# Patient Record
Sex: FEMALE | Race: WHITE | NOT HISPANIC OR LATINO | Employment: FULL TIME | URBAN - METROPOLITAN AREA
[De-identification: names, ages, dates, MRNs, and addresses within clinical notes are randomized per-mention and may not be internally consistent; named-entity substitution may affect disease eponyms.]

---

## 2021-10-02 ENCOUNTER — HOSPITAL ENCOUNTER (EMERGENCY)
Facility: HOSPITAL | Age: 46
Discharge: HOME | End: 2021-10-02
Payer: COMMERCIAL

## 2021-10-02 VITALS
OXYGEN SATURATION: 99 % | SYSTOLIC BLOOD PRESSURE: 201 MMHG | DIASTOLIC BLOOD PRESSURE: 106 MMHG | HEART RATE: 111 BPM | TEMPERATURE: 98.1 F | WEIGHT: 230.9 LBS | BODY MASS INDEX: 39.42 KG/M2 | RESPIRATION RATE: 20 BRPM | HEIGHT: 64 IN

## 2021-10-02 DIAGNOSIS — J06.9 VIRAL URI WITH COUGH: Primary | ICD-10-CM

## 2021-10-02 LAB — SARS-COV-2 RNA RESP QL NAA+PROBE: NEGATIVE

## 2021-10-02 PROCEDURE — 99284 EMERGENCY DEPT VISIT MOD MDM: CPT | Performed by: PHYSICIAN ASSISTANT

## 2021-10-02 PROCEDURE — 99283 EMERGENCY DEPT VISIT LOW MDM: CPT

## 2021-10-02 PROCEDURE — 87635 SARS-COV-2 COVID-19 AMP PRB: CPT | Performed by: PHYSICIAN ASSISTANT

## 2021-10-03 ENCOUNTER — TELEPHONE (OUTPATIENT)
Dept: EMERGENCY DEPT | Facility: HOSPITAL | Age: 46
End: 2021-10-03

## 2021-12-29 PROCEDURE — 87636 SARSCOV2 & INF A&B AMP PRB: CPT | Performed by: FAMILY MEDICINE

## 2024-06-03 ENCOUNTER — APPOINTMENT (EMERGENCY)
Dept: RADIOLOGY | Facility: HOSPITAL | Age: 49
DRG: 638 | End: 2024-06-03
Payer: COMMERCIAL

## 2024-06-03 ENCOUNTER — HOSPITAL ENCOUNTER (INPATIENT)
Facility: HOSPITAL | Age: 49
LOS: 5 days | Discharge: HOME | DRG: 638 | End: 2024-06-08
Attending: INTERNAL MEDICINE | Admitting: INTERNAL MEDICINE
Payer: COMMERCIAL

## 2024-06-03 DIAGNOSIS — R53.1 GENERALIZED WEAKNESS: ICD-10-CM

## 2024-06-03 DIAGNOSIS — R90.89 ABNORMAL FINDING ON MRI OF BRAIN: ICD-10-CM

## 2024-06-03 DIAGNOSIS — R79.89 ELEVATED TROPONIN: ICD-10-CM

## 2024-06-03 DIAGNOSIS — E11.65 TYPE 2 DIABETES MELLITUS WITH HYPERGLYCEMIA (HCC): ICD-10-CM

## 2024-06-03 DIAGNOSIS — R42 LIGHTHEADEDNESS: ICD-10-CM

## 2024-06-03 DIAGNOSIS — R00.0 TACHYCARDIA: ICD-10-CM

## 2024-06-03 DIAGNOSIS — E11.69 HYPERLIPIDEMIA ASSOCIATED WITH TYPE 2 DIABETES MELLITUS  (HCC): ICD-10-CM

## 2024-06-03 DIAGNOSIS — E78.5 HYPERLIPIDEMIA ASSOCIATED WITH TYPE 2 DIABETES MELLITUS  (HCC): ICD-10-CM

## 2024-06-03 DIAGNOSIS — E11.65 HYPERGLYCEMIA DUE TO DIABETES MELLITUS (HCC): Primary | ICD-10-CM

## 2024-06-03 PROBLEM — E87.29 HIGH ANION GAP METABOLIC ACIDOSIS: Status: ACTIVE | Noted: 2024-06-03

## 2024-06-03 PROBLEM — I10 ESSENTIAL HYPERTENSION: Status: ACTIVE | Noted: 2024-06-03

## 2024-06-03 PROBLEM — E66.01 MORBID OBESITY (HCC): Status: ACTIVE | Noted: 2024-06-03

## 2024-06-03 PROBLEM — R94.31 ABNORMAL EKG: Status: ACTIVE | Noted: 2024-06-03

## 2024-06-03 LAB
25(OH)D3 SERPL-MCNC: 16.9 NG/ML (ref 30–100)
2HR DELTA HS TROPONIN: 248 NG/L
4HR DELTA HS TROPONIN: 336 NG/L
ALBUMIN SERPL BCP-MCNC: 3.9 G/DL (ref 3.5–5)
ALP SERPL-CCNC: 62 U/L (ref 34–104)
ALT SERPL W P-5'-P-CCNC: 20 U/L (ref 7–52)
ANION GAP SERPL CALCULATED.3IONS-SCNC: 11 MMOL/L (ref 4–13)
ANION GAP SERPL CALCULATED.3IONS-SCNC: 17 MMOL/L (ref 4–13)
ANION GAP SERPL CALCULATED.3IONS-SCNC: 9 MMOL/L (ref 4–13)
AST SERPL W P-5'-P-CCNC: 17 U/L (ref 13–39)
B-OH-BUTYR SERPL-MCNC: 0.58 MMOL/L (ref 0.02–0.27)
BASOPHILS # BLD AUTO: 0.05 THOUSANDS/ÂΜL (ref 0–0.1)
BASOPHILS NFR BLD AUTO: 1 % (ref 0–1)
BILIRUB SERPL-MCNC: 0.39 MG/DL (ref 0.2–1)
BNP SERPL-MCNC: 26 PG/ML (ref 0–100)
BUN SERPL-MCNC: 29 MG/DL (ref 5–25)
BUN SERPL-MCNC: 29 MG/DL (ref 5–25)
BUN SERPL-MCNC: 30 MG/DL (ref 5–25)
CALCIUM SERPL-MCNC: 7.8 MG/DL (ref 8.4–10.2)
CALCIUM SERPL-MCNC: 8.2 MG/DL (ref 8.4–10.2)
CALCIUM SERPL-MCNC: 8.8 MG/DL (ref 8.4–10.2)
CARDIAC TROPONIN I PNL SERPL HS: 25 NG/L
CARDIAC TROPONIN I PNL SERPL HS: 273 NG/L
CARDIAC TROPONIN I PNL SERPL HS: 361 NG/L
CARDIAC TROPONIN I PNL SERPL HS: 407 NG/L (ref 8–18)
CHLORIDE SERPL-SCNC: 103 MMOL/L (ref 96–108)
CHLORIDE SERPL-SCNC: 104 MMOL/L (ref 96–108)
CHLORIDE SERPL-SCNC: 99 MMOL/L (ref 96–108)
CO2 SERPL-SCNC: 19 MMOL/L (ref 21–32)
CO2 SERPL-SCNC: 22 MMOL/L (ref 21–32)
CO2 SERPL-SCNC: 24 MMOL/L (ref 21–32)
CREAT SERPL-MCNC: 1.07 MG/DL (ref 0.6–1.3)
CREAT SERPL-MCNC: 1.12 MG/DL (ref 0.6–1.3)
CREAT SERPL-MCNC: 1.49 MG/DL (ref 0.6–1.3)
EOSINOPHIL # BLD AUTO: 0.15 THOUSAND/ÂΜL (ref 0–0.61)
EOSINOPHIL NFR BLD AUTO: 2 % (ref 0–6)
ERYTHROCYTE [DISTWIDTH] IN BLOOD BY AUTOMATED COUNT: 17.6 % (ref 11.6–15.1)
EST. AVERAGE GLUCOSE BLD GHB EST-MCNC: 349 MG/DL
GFR SERPL CREATININE-BSD FRML MDRD: 40 ML/MIN/1.73SQ M
GFR SERPL CREATININE-BSD FRML MDRD: 57 ML/MIN/1.73SQ M
GFR SERPL CREATININE-BSD FRML MDRD: 60 ML/MIN/1.73SQ M
GLUCOSE SERPL-MCNC: 102 MG/DL (ref 65–140)
GLUCOSE SERPL-MCNC: 107 MG/DL (ref 65–140)
GLUCOSE SERPL-MCNC: 125 MG/DL (ref 65–140)
GLUCOSE SERPL-MCNC: 137 MG/DL (ref 65–140)
GLUCOSE SERPL-MCNC: 187 MG/DL (ref 65–140)
GLUCOSE SERPL-MCNC: 195 MG/DL (ref 65–140)
GLUCOSE SERPL-MCNC: 197 MG/DL (ref 65–140)
GLUCOSE SERPL-MCNC: 202 MG/DL (ref 65–140)
GLUCOSE SERPL-MCNC: 252 MG/DL (ref 65–140)
GLUCOSE SERPL-MCNC: 321 MG/DL (ref 65–140)
HBA1C MFR BLD: 13.8 %
HCT VFR BLD AUTO: 43.3 % (ref 34.8–46.1)
HGB BLD-MCNC: 13.2 G/DL (ref 11.5–15.4)
IMM GRANULOCYTES # BLD AUTO: 0.03 THOUSAND/UL (ref 0–0.2)
IMM GRANULOCYTES NFR BLD AUTO: 0 % (ref 0–2)
LYMPHOCYTES # BLD AUTO: 1.89 THOUSANDS/ÂΜL (ref 0.6–4.47)
LYMPHOCYTES NFR BLD AUTO: 19 % (ref 14–44)
MAGNESIUM SERPL-MCNC: 1.5 MG/DL (ref 1.9–2.7)
MCH RBC QN AUTO: 20 PG (ref 26.8–34.3)
MCHC RBC AUTO-ENTMCNC: 30.5 G/DL (ref 31.4–37.4)
MCV RBC AUTO: 66 FL (ref 82–98)
MONOCYTES # BLD AUTO: 0.9 THOUSAND/ÂΜL (ref 0.17–1.22)
MONOCYTES NFR BLD AUTO: 9 % (ref 4–12)
NEUTROPHILS # BLD AUTO: 7.21 THOUSANDS/ÂΜL (ref 1.85–7.62)
NEUTS SEG NFR BLD AUTO: 69 % (ref 43–75)
NRBC BLD AUTO-RTO: 0 /100 WBCS
PH BLDV: 7.39 [PH] (ref 7.3–7.4)
PHOSPHATE SERPL-MCNC: 2.8 MG/DL (ref 2.7–4.5)
PLATELET # BLD AUTO: 397 THOUSANDS/UL (ref 149–390)
PMV BLD AUTO: 10 FL (ref 8.9–12.7)
POTASSIUM SERPL-SCNC: 3.6 MMOL/L (ref 3.5–5.3)
POTASSIUM SERPL-SCNC: 3.6 MMOL/L (ref 3.5–5.3)
POTASSIUM SERPL-SCNC: 3.7 MMOL/L (ref 3.5–5.3)
PROT SERPL-MCNC: 7.7 G/DL (ref 6.4–8.4)
RBC # BLD AUTO: 6.61 MILLION/UL (ref 3.81–5.12)
SODIUM SERPL-SCNC: 135 MMOL/L (ref 135–147)
SODIUM SERPL-SCNC: 136 MMOL/L (ref 135–147)
SODIUM SERPL-SCNC: 137 MMOL/L (ref 135–147)
TSH SERPL DL<=0.05 MIU/L-ACNC: 4.83 UIU/ML (ref 0.45–4.5)
VIT B12 SERPL-MCNC: 341 PG/ML (ref 180–914)
WBC # BLD AUTO: 10.23 THOUSAND/UL (ref 4.31–10.16)

## 2024-06-03 PROCEDURE — 36415 COLL VENOUS BLD VENIPUNCTURE: CPT | Performed by: INTERNAL MEDICINE

## 2024-06-03 PROCEDURE — 80048 BASIC METABOLIC PNL TOTAL CA: CPT | Performed by: INTERNAL MEDICINE

## 2024-06-03 PROCEDURE — 96361 HYDRATE IV INFUSION ADD-ON: CPT

## 2024-06-03 PROCEDURE — 83036 HEMOGLOBIN GLYCOSYLATED A1C: CPT | Performed by: INTERNAL MEDICINE

## 2024-06-03 PROCEDURE — 96360 HYDRATION IV INFUSION INIT: CPT

## 2024-06-03 PROCEDURE — 83735 ASSAY OF MAGNESIUM: CPT | Performed by: INTERNAL MEDICINE

## 2024-06-03 PROCEDURE — 82948 REAGENT STRIP/BLOOD GLUCOSE: CPT

## 2024-06-03 PROCEDURE — 82306 VITAMIN D 25 HYDROXY: CPT | Performed by: INTERNAL MEDICINE

## 2024-06-03 PROCEDURE — 93005 ELECTROCARDIOGRAM TRACING: CPT

## 2024-06-03 PROCEDURE — 99223 1ST HOSP IP/OBS HIGH 75: CPT | Performed by: INTERNAL MEDICINE

## 2024-06-03 PROCEDURE — 99285 EMERGENCY DEPT VISIT HI MDM: CPT | Performed by: INTERNAL MEDICINE

## 2024-06-03 PROCEDURE — 96372 THER/PROPH/DIAG INJ SC/IM: CPT

## 2024-06-03 PROCEDURE — 84443 ASSAY THYROID STIM HORMONE: CPT | Performed by: INTERNAL MEDICINE

## 2024-06-03 PROCEDURE — 82800 BLOOD PH: CPT | Performed by: INTERNAL MEDICINE

## 2024-06-03 PROCEDURE — 84100 ASSAY OF PHOSPHORUS: CPT | Performed by: INTERNAL MEDICINE

## 2024-06-03 PROCEDURE — 84439 ASSAY OF FREE THYROXINE: CPT | Performed by: INTERNAL MEDICINE

## 2024-06-03 PROCEDURE — 84484 ASSAY OF TROPONIN QUANT: CPT | Performed by: INTERNAL MEDICINE

## 2024-06-03 PROCEDURE — 82010 KETONE BODYS QUAN: CPT | Performed by: INTERNAL MEDICINE

## 2024-06-03 PROCEDURE — 85025 COMPLETE CBC W/AUTO DIFF WBC: CPT | Performed by: INTERNAL MEDICINE

## 2024-06-03 PROCEDURE — 82607 VITAMIN B-12: CPT | Performed by: INTERNAL MEDICINE

## 2024-06-03 PROCEDURE — 71045 X-RAY EXAM CHEST 1 VIEW: CPT

## 2024-06-03 PROCEDURE — 83880 ASSAY OF NATRIURETIC PEPTIDE: CPT | Performed by: INTERNAL MEDICINE

## 2024-06-03 PROCEDURE — 99284 EMERGENCY DEPT VISIT MOD MDM: CPT

## 2024-06-03 PROCEDURE — 80053 COMPREHEN METABOLIC PANEL: CPT | Performed by: INTERNAL MEDICINE

## 2024-06-03 RX ORDER — HYDRALAZINE HYDROCHLORIDE 20 MG/ML
5 INJECTION INTRAMUSCULAR; INTRAVENOUS EVERY 6 HOURS PRN
Status: DISCONTINUED | OUTPATIENT
Start: 2024-06-03 | End: 2024-06-08 | Stop reason: HOSPADM

## 2024-06-03 RX ORDER — INSULIN GLARGINE 100 [IU]/ML
25 INJECTION, SOLUTION SUBCUTANEOUS
Status: DISCONTINUED | OUTPATIENT
Start: 2024-06-03 | End: 2024-06-08 | Stop reason: HOSPADM

## 2024-06-03 RX ORDER — SODIUM CHLORIDE, SODIUM LACTATE, POTASSIUM CHLORIDE, CALCIUM CHLORIDE 600; 310; 30; 20 MG/100ML; MG/100ML; MG/100ML; MG/100ML
100 INJECTION, SOLUTION INTRAVENOUS CONTINUOUS
Status: DISCONTINUED | OUTPATIENT
Start: 2024-06-03 | End: 2024-06-05

## 2024-06-03 RX ORDER — ONDANSETRON 2 MG/ML
4 INJECTION INTRAMUSCULAR; INTRAVENOUS ONCE
Status: DISCONTINUED | OUTPATIENT
Start: 2024-06-03 | End: 2024-06-03

## 2024-06-03 RX ORDER — INSULIN GLARGINE 100 [IU]/ML
25 INJECTION, SOLUTION SUBCUTANEOUS
COMMUNITY

## 2024-06-03 RX ORDER — LISINOPRIL AND HYDROCHLOROTHIAZIDE 25; 20 MG/1; MG/1
1 TABLET ORAL DAILY
COMMUNITY

## 2024-06-03 RX ORDER — ACETAMINOPHEN 325 MG/1
650 TABLET ORAL EVERY 6 HOURS PRN
Status: DISCONTINUED | OUTPATIENT
Start: 2024-06-03 | End: 2024-06-08 | Stop reason: HOSPADM

## 2024-06-03 RX ORDER — SODIUM CHLORIDE 9 MG/ML
125 INJECTION, SOLUTION INTRAVENOUS CONTINUOUS
Status: DISCONTINUED | OUTPATIENT
Start: 2024-06-03 | End: 2024-06-03

## 2024-06-03 RX ORDER — INSULIN LISPRO 100 [IU]/ML
1-6 INJECTION, SOLUTION INTRAVENOUS; SUBCUTANEOUS
Status: DISCONTINUED | OUTPATIENT
Start: 2024-06-04 | End: 2024-06-08 | Stop reason: HOSPADM

## 2024-06-03 RX ADMIN — SODIUM CHLORIDE, SODIUM LACTATE, POTASSIUM CHLORIDE, AND CALCIUM CHLORIDE 1000 ML: .6; .31; .03; .02 INJECTION, SOLUTION INTRAVENOUS at 22:56

## 2024-06-03 RX ADMIN — INSULIN HUMAN 8 UNITS: 100 INJECTION, SOLUTION PARENTERAL at 16:16

## 2024-06-03 RX ADMIN — SODIUM CHLORIDE 6 UNITS/HR: 9 INJECTION, SOLUTION INTRAVENOUS at 17:53

## 2024-06-03 RX ADMIN — SODIUM CHLORIDE, SODIUM LACTATE, POTASSIUM CHLORIDE, AND CALCIUM CHLORIDE 150 ML/HR: .6; .31; .03; .02 INJECTION, SOLUTION INTRAVENOUS at 21:30

## 2024-06-03 RX ADMIN — INSULIN GLARGINE 25 UNITS: 100 INJECTION, SOLUTION SUBCUTANEOUS at 21:31

## 2024-06-03 RX ADMIN — SODIUM CHLORIDE 1.5 UNITS/HR: 9 INJECTION, SOLUTION INTRAVENOUS at 21:42

## 2024-06-03 RX ADMIN — SODIUM BICARBONATE 125 ML/HR: 84 INJECTION, SOLUTION INTRAVENOUS at 18:06

## 2024-06-03 RX ADMIN — SODIUM CHLORIDE 1000 ML: 0.9 INJECTION, SOLUTION INTRAVENOUS at 15:07

## 2024-06-03 NOTE — ASSESSMENT & PLAN NOTE
Anion gap acidosis noted on admission -> patient beta-hydroxybutyrate with a normal pH on blood gas  Suspect secondary to intravascular volume depletion and uncontrolled diabetes mellitus (see above)  IV fluid infusion with a bicarbonate drip initiated  Monitor serum bicarbonate/potassium and renal function

## 2024-06-03 NOTE — ASSESSMENT & PLAN NOTE
Presenting creatinine of 1.49 (baseline unknown)  Continue IV fluid hydration and monitor renal function and urine output  Limit/when nephrotoxins and hypotension as possible  Check renal ultrasound to assess for obstructive etiology

## 2024-06-03 NOTE — ED PROVIDER NOTES
"History  Chief Complaint   Patient presents with    Generalized Body Aches     Reports generalized body aches, \"vomiting phlegm\" type 2 diabetic      This is 48 years old came for having generalized weakness.  Patient felt nauseous and she vomited 1 time today.  Patient also having diarrhea x 1 today.  Patient has no fever.  Patient is diabetic and she is on metformin and insulin and she stated that she is compliant with her medications.  Patient stated that in the last few days her blood sugar is going up high.  Patient to contact her PMD today who told her to go to the ER to rule out with ketones in the blood on the urine.  Patient denies CP or SOB.  Patient denies abdominal pain.  Patient has no urinary symptoms.  Patient put on the monitor and she is tachycardic with HR up to 140.  Patient denies any congestion.        Prior to Admission Medications   Prescriptions Last Dose Informant Patient Reported? Taking?   insulin glargine (LANTUS) 100 units/mL subcutaneous injection   Yes Yes   Sig: Inject 25 Units under the skin daily at bedtime   lisinopril-hydrochlorothiazide (PRINZIDE,ZESTORETIC) 20-25 MG per tablet   Yes Yes   Sig: Take 1 tablet by mouth daily   metFORMIN (GLUCOPHAGE) 500 mg tablet   Yes Yes   Sig: Take 500 mg by mouth daily with breakfast      Facility-Administered Medications: None       Past Medical History:   Diagnosis Date    Hypertension        History reviewed. No pertinent surgical history.    History reviewed. No pertinent family history.  I have reviewed and agree with the history as documented.    E-Cigarette/Vaping    E-Cigarette Use Never User      E-Cigarette/Vaping Substances     Social History     Tobacco Use    Smoking status: Never    Smokeless tobacco: Never   Vaping Use    Vaping status: Never Used   Substance Use Topics    Alcohol use: Not Currently    Drug use: Not Currently       Review of Systems   Constitutional:  Negative for fatigue and fever.   HENT:  Negative for " congestion, ear discharge, ear pain, facial swelling, sinus pressure, sinus pain, sneezing, sore throat, tinnitus and trouble swallowing.    Eyes:  Negative for photophobia, pain, redness, itching and visual disturbance.   Respiratory:  Negative for cough and shortness of breath.    Cardiovascular:  Negative for chest pain and palpitations.   Gastrointestinal:  Positive for diarrhea and nausea. Negative for abdominal pain and vomiting.   Genitourinary:  Negative for difficulty urinating, dysuria, flank pain and frequency.   Musculoskeletal:  Negative for back pain, myalgias, neck pain and neck stiffness.   Skin:  Negative for color change, pallor and rash.   Neurological:  Negative for dizziness, light-headedness and headaches.   Psychiatric/Behavioral:  Negative for agitation and behavioral problems.        Physical Exam  Physical Exam  Vitals and nursing note reviewed.   Constitutional:       General: She is not in acute distress.     Appearance: She is well-developed. She is not ill-appearing, toxic-appearing or diaphoretic.   HENT:      Head: Normocephalic and atraumatic.      Right Ear: Tympanic membrane and ear canal normal. There is no impacted cerumen.      Left Ear: Tympanic membrane and ear canal normal. There is no impacted cerumen.      Nose: Nose normal. No congestion or rhinorrhea.      Mouth/Throat:      Mouth: Oropharynx is clear and moist.      Pharynx: No oropharyngeal exudate.   Eyes:      General: No scleral icterus.        Right eye: No discharge.         Left eye: No discharge.      Extraocular Movements: Extraocular movements intact.      Pupils: Pupils are equal, round, and reactive to light.   Neck:      Vascular: No carotid bruit.   Cardiovascular:      Rate and Rhythm: Normal rate and regular rhythm.      Heart sounds: Normal heart sounds. No murmur heard.     No friction rub. No gallop.   Pulmonary:      Effort: Pulmonary effort is normal. No respiratory distress.      Breath sounds:  Normal breath sounds. No wheezing, rhonchi or rales.   Chest:      Chest wall: No tenderness.   Abdominal:      General: Bowel sounds are normal. There is no distension.      Palpations: Abdomen is soft. There is no mass.      Tenderness: There is no abdominal tenderness. There is no right CVA tenderness, left CVA tenderness, guarding or rebound.      Hernia: No hernia is present.   Musculoskeletal:         General: No swelling, tenderness, deformity, signs of injury or edema. Normal range of motion.      Cervical back: Normal range of motion and neck supple. No rigidity or tenderness.      Right lower leg: No edema.      Left lower leg: No edema.   Lymphadenopathy:      Cervical: No cervical adenopathy.   Skin:     General: Skin is warm and dry.      Capillary Refill: Capillary refill takes less than 2 seconds.      Coloration: Skin is not jaundiced or pale.      Findings: No bruising, erythema, lesion or rash.   Neurological:      General: No focal deficit present.      Mental Status: She is alert and oriented to person, place, and time.      Cranial Nerves: No cranial nerve deficit.      Sensory: No sensory deficit.   Psychiatric:         Mood and Affect: Mood and affect normal.         Behavior: Behavior normal.         Vital Signs  ED Triage Vitals   Temperature Pulse Respirations Blood Pressure SpO2   06/03/24 1434 06/03/24 1434 06/03/24 1434 06/03/24 1434 06/03/24 1434   98.8 °F (37.1 °C) (!) 143 22 120/56 97 %      Temp Source Heart Rate Source Patient Position - Orthostatic VS BP Location FiO2 (%)   06/03/24 1434 06/03/24 1434 06/03/24 1434 06/03/24 1434 --   Oral Monitor Lying Right arm       Pain Score       06/03/24 1426       10 - Worst Possible Pain           Vitals:    06/03/24 2229 06/03/24 2333 06/04/24 0045 06/04/24 0645   BP: (!) 82/68 92/62 103/72 110/65   Pulse:  88 84 74   Patient Position - Orthostatic VS:  Lying Lying Lying         Visual Acuity      ED Medications  Medications    trimethobenzamide (TIGAN) IM injection 200 mg (has no administration in time range)   acetaminophen (TYLENOL) tablet 650 mg (650 mg Oral Given 6/4/24 0105)   hydrALAZINE (APRESOLINE) injection 5 mg (has no administration in time range)   insulin glargine (LANTUS) subcutaneous injection 25 Units 0.25 mL (25 Units Subcutaneous Given 6/3/24 2131)   lactated ringers infusion (150 mL/hr Intravenous New Bag 6/4/24 0222)   insulin lispro (HumALOG/ADMELOG) 100 units/mL subcutaneous injection 1-6 Units (has no administration in time range)   sodium chloride 0.9 % bolus 1,000 mL (1,000 mL Intravenous New Bag 6/3/24 1507)   insulin regular (HumuLIN R,NovoLIN R) injection 8 Units (8 Units Subcutaneous Given 6/3/24 1616)   lactated ringers bolus 1,000 mL (1,000 mL Intravenous New Bag 6/3/24 2256)   magnesium sulfate 2 g/50 mL IVPB (premix) 2 g (2 g Intravenous New Bag 6/4/24 0220)       Diagnostic Studies  Results Reviewed       Procedure Component Value Units Date/Time    CBC and differential [[id number]]  (Abnormal) Collected: 06/04/24 0609    Lab Status: Final result Specimen: Blood from Arm, Right Updated: 06/04/24 0653     WBC 5.66 Thousand/uL      RBC 5.22 Million/uL      Hemoglobin 10.4 g/dL      Hematocrit 34.4 %      MCV 66 fL      MCH 19.9 pg      MCHC 30.2 g/dL      RDW 15.9 %      MPV 9.7 fL      Platelets 279 Thousands/uL      nRBC 0 /100 WBCs      Segmented % 44 %      Immature Grans % 0 %      Lymphocytes % 41 %      Monocytes % 11 %      Eosinophils Relative 4 %      Basophils Relative 0 %      Absolute Neutrophils 2.49 Thousands/µL      Absolute Immature Grans 0.01 Thousand/uL      Absolute Lymphocytes 2.29 Thousands/µL      Absolute Monocytes 0.64 Thousand/µL      Eosinophils Absolute 0.21 Thousand/µL      Basophils Absolute 0.02 Thousands/µL     UA w Reflex to Microscopic w Reflex to Culture [[id number]] Collected: 06/04/24 0621    Lab Status: In process Specimen: Urine, Clean Catch Updated: 06/04/24 0650     Basic metabolic panel [[id number]] Collected: 06/04/24 0609    Lab Status: In process Specimen: Blood from Arm, Right Updated: 06/04/24 0648    Phosphorus [[id number]] Collected: 06/04/24 0609    Lab Status: In process Specimen: Blood from Arm, Right Updated: 06/04/24 0648    Vitamin D 1,25 dihydroxy [[id number]] Collected: 06/04/24 0620    Lab Status: No result Specimen: Blood from Arm, Right     Hemoglobin A1C w/ EAG Estimation [[id number]]  (Abnormal) Collected: 06/03/24 1459    Lab Status: Final result Specimen: Blood from Arm, Left Updated: 06/03/24 2301     Hemoglobin A1C 13.8 %       mg/dl     Vitamin B12 [[id number]]  (Normal) Collected: 06/03/24 1459    Lab Status: Final result Specimen: Blood from Arm, Left Updated: 06/03/24 2218     Vitamin B-12 341 pg/mL     Vitamin D 25 hydroxy [[id number]]  (Abnormal) Collected: 06/03/24 1459    Lab Status: Final result Specimen: Blood from Arm, Left Updated: 06/03/24 2218     Vit D, 25-Hydroxy 16.9 ng/mL     Basic metabolic panel [[id number]]  (Abnormal) Collected: 06/03/24 2043    Lab Status: Final result Specimen: Blood from Hand, Left Updated: 06/03/24 2104     Sodium 137 mmol/L      Potassium 3.6 mmol/L      Chloride 104 mmol/L      CO2 24 mmol/L      ANION GAP 9 mmol/L      BUN 29 mg/dL      Creatinine 1.07 mg/dL      Glucose 102 mg/dL      Calcium 8.2 mg/dL      eGFR 60 ml/min/1.73sq m     Narrative:      [employer] guidelines for Chronic Kidney Disease (CKD):     Stage 1 with normal or high GFR (GFR > 90 mL/min/1.73 square meters)    Stage 2 Mild CKD (GFR = 60-89 mL/min/1.73 square meters)    Stage 3A Moderate CKD (GFR = 45-59 mL/min/1.73 square meters)    Stage 3B Moderate CKD (GFR = 30-44 mL/min/1.73 square meters)    Stage 4 Severe CKD (GFR = 15-29 mL/min/1.73 square meters)    Stage 5 End Stage CKD (GFR <15 mL/min/1.73 square meters)  Note: GFR calculation is accurate only with a steady state creatinine    Phosphorus [[id number]]   (Normal) Collected: 06/03/24 2043    Lab Status: Final result Specimen: Blood from Hand, Left Updated: 06/03/24 2104     Phosphorus 2.8 mg/dL     HS Troponin I 4hr [[id number]]  (Abnormal) Collected: 06/03/24 1911    Lab Status: Final result Specimen: Blood from Hand, Right Updated: 06/03/24 1941     hs TnI 4hr 361 ng/L      Delta 4hr hsTnI 336 ng/L     Fingerstick Glucose (POCT) [[id number]]  (Abnormal) Collected: 06/03/24 1903    Lab Status: Final result Specimen: Blood Updated: 06/03/24 1904     POC Glucose 195 mg/dl     TSH, 3rd generation with Free T4 reflex [[id number]]  (Abnormal) Collected: 06/03/24 1459    Lab Status: Final result Specimen: Blood from Arm, Left Updated: 06/03/24 1858     TSH 3RD GENERATON 4.826 uIU/mL     T4, free [[id number]] Collected: 06/03/24 1459    Lab Status: In process Specimen: Blood from Arm, Left Updated: 06/03/24 1858    HS Troponin I 2hr [[id number]]  (Abnormal) Collected: 06/03/24 1759    Lab Status: Final result Specimen: Blood from Hand, Right Updated: 06/03/24 1827     hs TnI 2hr 273 ng/L      Delta 2hr hsTnI 248 ng/L     Fingerstick Glucose (POCT) [[id number]]  (Abnormal) Collected: 06/03/24 1734    Lab Status: Final result Specimen: Blood Updated: 06/03/24 1735     POC Glucose 252 mg/dl     HS Troponin 0hr (reflex protocol) [[id number]]  (Normal) Collected: 06/03/24 1459    Lab Status: Final result Specimen: Blood from Arm, Left Updated: 06/03/24 1529     hs TnI 0hr 25 ng/L     Comprehensive metabolic panel [[id number]]  (Abnormal) Collected: 06/03/24 1459    Lab Status: Final result Specimen: Blood from Arm, Left Updated: 06/03/24 1522     Sodium 135 mmol/L      Potassium 3.7 mmol/L      Chloride 99 mmol/L      CO2 19 mmol/L      ANION GAP 17 mmol/L      BUN 29 mg/dL      Creatinine 1.49 mg/dL      Glucose 321 mg/dL      Calcium 8.8 mg/dL      AST 17 U/L      ALT 20 U/L      Alkaline Phosphatase 62 U/L      Total Protein 7.7 g/dL      Albumin 3.9 g/dL      Total Bilirubin 0.39  mg/dL      eGFR 40 ml/min/1.73sq m     Narrative:      National [employer] guidelines for Chronic Kidney Disease (CKD):     Stage 1 with normal or high GFR (GFR > 90 mL/min/1.73 square meters)    Stage 2 Mild CKD (GFR = 60-89 mL/min/1.73 square meters)    Stage 3A Moderate CKD (GFR = 45-59 mL/min/1.73 square meters)    Stage 3B Moderate CKD (GFR = 30-44 mL/min/1.73 square meters)    Stage 4 Severe CKD (GFR = 15-29 mL/min/1.73 square meters)    Stage 5 End Stage CKD (GFR <15 mL/min/1.73 square meters)  Note: GFR calculation is accurate only with a steady state creatinine    Beta Hydroxybutyrate [[id number]]  (Abnormal) Collected: 06/03/24 1459    Lab Status: Final result Specimen: Blood from Arm, Left Updated: 06/03/24 1522     Beta- Hydroxybutyrate 0.58 mmol/L     pH, venous [[id number]]  (Normal) Collected: 06/03/24 1514    Lab Status: Final result Specimen: Blood from Arm, Left Updated: 06/03/24 1521     pH, Maik 7.392    CBC and differential [[id number]]  (Abnormal) Collected: 06/03/24 1459    Lab Status: Final result Specimen: Blood from Arm, Left Updated: 06/03/24 1507     WBC 10.23 Thousand/uL      RBC 6.61 Million/uL      Hemoglobin 13.2 g/dL      Hematocrit 43.3 %      MCV 66 fL      MCH 20.0 pg      MCHC 30.5 g/dL      RDW 17.6 %      MPV 10.0 fL      Platelets 397 Thousands/uL      nRBC 0 /100 WBCs      Segmented % 69 %      Immature Grans % 0 %      Lymphocytes % 19 %      Monocytes % 9 %      Eosinophils Relative 2 %      Basophils Relative 1 %      Absolute Neutrophils 7.21 Thousands/µL      Absolute Immature Grans 0.03 Thousand/uL      Absolute Lymphocytes 1.89 Thousands/µL      Absolute Monocytes 0.90 Thousand/µL      Eosinophils Absolute 0.15 Thousand/µL      Basophils Absolute 0.05 Thousands/µL                    XR chest 1 view portable   ED Interpretation by Hailey Morin MD (06/03 0684)   CXR; NO acute cardiopulmonary findings      Final Result by Sydney Gilman MD (06/03  1717)      No acute cardiopulmonary disease.            Workstation performed: [id number]          kidney and bladder with pvr    (Results Pending)              Procedures  ECG 12 Lead Documentation Only    Date/Time: 6/3/2024 3:04 PM    Performed by: Hailey Morin MD  Authorized by: Hailey Morin MD    Indications / Diagnosis:  WEAKNESS  ECG reviewed by me, the ED Provider: yes    Patient location:  ED and bedside  Previous ECG:     Previous ECG:  Unavailable  Interpretation:     Interpretation: abnormal    Rate:     ECG rate:  137    ECG rate assessment: tachycardic    Rhythm:     Rhythm: sinus tachycardia    T waves:     T waves: inverted      Inverted:  III and aVF  Q waves:     Q waves:  V1 and V2           ED Course                                             Medical Decision Making  This is a 48 years old diabetic came for having generalized weakness and patient stated that she is nauseous she vomited x 1 today.  Patient has diarrhea x 1 today.  Patient stated in the last few days.  BS is high.  Patient has no CP, SOB.  Patient denies any fever.  Patient contacted PMD who told her to go to the ER.  Patient has history of diabetes, hypertension.  Patient denies history of CAD.  Physical exam shows no pertinent positive findings.  EKG shows sinus tach with rate 137/min and Q waves in V1, V2 consistent with septal infarction.  T wave inversion in III and aVF.  CXR; no acute cardiopulmonary findings.  Reviewing the labs; wbc 10.23, Venous PH 7.39,  glucose 321, beta hydroxybutyrate is 0.58 , creatinine 1.49, anion gap is 17, Co2 19.  Patient received IV fluids, saline and insulin patient is on the monitor and she has persistent tachycardia with a HR of goes between 140-120/min does not go down.  Lowest HR is 120/min.  Patient need to be admitted for monitoring, tachycardia and uncontrolled hyperglycemia.  Troponin is 25.  Case discussed with cardiologist Rachel Florentino; and he stated; Sinus  tachycardia I suspect is due to the underlying process and metabolic disturbances. Would focus on treatment of the hyperglycemia and LEONORA. If she is volume depleted from hyperglycemia and/or diarrhea, then the tachycardia should hopefully improve with more IV fluids. As far as the septal q waves and t wave inversions, not much to do in the setting of her metabolic derangements and absence of cardiac symptoms. But an echocardiogram would be reasonable    Amount and/or Complexity of Data Reviewed  Labs: ordered.     Details: Reviewing the labs glucose 321, beta hydroxybutyrate is 0.58 , creatinine 1.49, anion gap is 17, Co2 19. WBC 10.2. VENOUS ph 7.39  Radiology: ordered and independent interpretation performed.  ECG/medicine tests: ordered and independent interpretation performed.     Details: EKG shows sinus tach rate 177/min, Q waves in V1 V2 consistent with old septal infarction.  T wave inversion in III and aVF  Discussion of management or test interpretation with external provider(s): Case discussed with cardiologist Rachel Florentino; and he stated; Sinus tachycardia I suspect is due to the underlying process and metabolic disturbances. Would focus on treatment of the hyperglycemia and LEONORA. If she is volume depleted from hyperglycemia and/or diarrhea, then the tachycardia should hopefully improve with more IV fluids. As far as the septal q waves and t wave inversions, not much to do in the setting of her metabolic derangements and absence of cardiac symptoms. But an echocardiogram would be reasonable    Risk  OTC drugs.  Prescription drug management.  Decision regarding hospitalization.             Disposition  Final diagnoses:   Hyperglycemia due to diabetes mellitus (HCC)   Tachycardia     Time reflects when diagnosis was documented in both MDM as applicable and the Disposition within this note       Time User Action Codes Description Comment    6/3/2024  4:55 PM Hailey Morin Add [E11.65] Hyperglycemia  due to diabetes mellitus (HCC)     6/3/2024  4:56 PM Hailey Morin Add [R00.0] Tachycardia     6/3/2024  9:07 PM Irwin French Add [R79.89] Elevated troponin           ED Disposition       ED Disposition   Admit    Condition   Stable    Date/Time   Mon Jhon 3, 2024  5:10 PM    Comment   Case was discussed with MANNIE and the patient's admission status was agreed to be Admission Status: inpatient status to the service of Dr. MORALEZ               Follow-up Information    None         Current Discharge Medication List        CONTINUE these medications which have NOT CHANGED    Details   insulin glargine (LANTUS) 100 units/mL subcutaneous injection Inject 25 Units under the skin daily at bedtime      lisinopril-hydrochlorothiazide (PRINZIDE,ZESTORETIC) 20-25 MG per tablet Take 1 tablet by mouth daily      metFORMIN (GLUCOPHAGE) 500 mg tablet Take 500 mg by mouth daily with breakfast             No discharge procedures on file.    PDMP Review       None            ED Provider  Electronically Signed by             Hailey Morin MD  06/04/24 0716

## 2024-06-03 NOTE — ASSESSMENT & PLAN NOTE
Likely multifactorial secondary to uncontrolled diabetes mellitus and sequela as below  Check TSH and B12/vitamin D levels  PT/OT input to be appreciated

## 2024-06-03 NOTE — ASSESSMENT & PLAN NOTE
Presenting EKG with evidence of Q waves and nonspecific T wave inversions in III and aVF  ED provider discussed with on-call cardiologist who feel nonstick changes are probably secondary to metabolic derangements as above -> continue IV fluid hydration and optimize blood sugar control  Telemetry and electrolytes monitoring - repeat EKG in the morning  Await official cardiology consultation  Baseline high-sensitivity troponin normal -> trend serial sets

## 2024-06-03 NOTE — ASSESSMENT & PLAN NOTE
Check updated A1c  Presenting blood glucose of 321 -> 252 status post IV fluid hydration and a one-time regular insulin SC injection  In light of mild beta hydroxybutyrate elevation and anion gap metabolic acidosis, initiated on an insulin drip along with IV fluids (bicarbonate infusion)  Monitor serial Accu-Cheks with hopeful plan to wean off insulin drip to basal/prandial insulin in the next 12-24 hours  Monitor/replete serum potassium/phosphate deficiencies if present  Will require ambulatory referral to endocrinology on discharge

## 2024-06-03 NOTE — H&P
[facility]  H&P  Name: Faye Samuels 48 y.o. female I MRN: [id number]  Unit/Bed#: ED 07 I Date of Admission: 6/3/2024   Date of Service: 6/3/2024 I Hospital Day: 0      Assessment & Plan:    * Generalized weakness  Assessment & Plan  Likely multifactorial secondary to uncontrolled diabetes mellitus and sequela as below  Check TSH and B12/vitamin D levels  PT/OT input to be appreciated    Type 2 diabetes mellitus with hyperglycemia (HCC)  Assessment & Plan  Check updated A1c  Presenting blood glucose of 321 -> 252 status post IV fluid hydration and a one-time regular insulin SC injection  In light of mild beta hydroxybutyrate elevation and anion gap metabolic acidosis, initiated on an insulin drip along with IV fluids (bicarbonate infusion)  Monitor serial Accu-Cheks with hopeful plan to wean off insulin drip to basal/prandial insulin in the next 12-24 hours  Monitor/replete serum potassium/phosphate deficiencies if present  Will require ambulatory referral to endocrinology on discharge    Anion gap metabolic acidosis  Assessment & Plan  Anion gap acidosis noted on admission -> patient beta-hydroxybutyrate with a normal pH on blood gas  Suspect secondary to intravascular volume depletion and uncontrolled diabetes mellitus (see above)  IV fluid infusion with a bicarbonate drip initiated  Monitor serum bicarbonate/potassium and renal function    Elevated serum creatinine  Assessment & Plan  Presenting creatinine of 1.49 (baseline unknown)  Continue IV fluid hydration and monitor renal function and urine output  Limit/when nephrotoxins and hypotension as possible  Check renal ultrasound to assess for obstructive etiology    Abnormal EKG  Assessment & Plan  Presenting EKG with evidence of Q waves and nonspecific T wave inversions in III and aVF  ED provider discussed with on-call cardiologist who feel nonstick changes are probably secondary to metabolic derangements as above -> continue IV fluid  hydration and optimize blood sugar control  Telemetry and electrolytes monitoring - repeat EKG in the morning  Await official cardiology consultation  Baseline high-sensitivity troponin normal -> trend serial sets    Essential hypertension  Assessment & Plan  Hold HCTZ/ACEI in the setting of elevated creatinine and borderline hypotensive episodes   Resume when able     Morbid obesity (HCC)  Assessment & Plan  BMI of 38.22  Lifestyle/diet modifications      DVT Prophylaxis: SCDs    Code Status:  Full code    Discussion with:  Patient at bedside    Anticipated Length of Stay:  Patient will be admitted on an Inpatient basis with an anticipated length of stay of greater than 2 midnights.   Justification for Hospital Stay: Generalized weakness secondary to uncontrolled diabetes mellitus with elevated creatinine and metabolic acidosis, requiring optimization of blood sugar control, IV fluid hydration, and electrolyte monitoring.    Total Time for Visit, including Counseling / Coordination of Care: 75 minutes. More than 50% of total time spent on counseling and coordination of care, on one or more of the following: performing physical exam; counseling and coordination of care; obtaining or reviewing history; documenting in the medical record; reviewing/ordering tests, medications or procedures; communicating with other healthcare professionals and discussing with patient's family/caregivers.      Chief Complaint: Weakness with nausea/vomiting      History of Present Illness:    Faye Saumels is a 48 y.o. female who presents with complaints of progressive generalized weakness along with intermittent nausea and one-time vomiting episode earlier today.  Of note, she has a history of type 2 diabetes mellitus and noted compliance with her home Metformin regimen to the ED provider.    In the ED, she was noted on blood work to have an anion gap metabolic acidosis with a mildly elevated beta hydroxybutyrate, however, pH on blood  "gas was normal.  She was given a bolus of IV fluids along with a one-time subcutaneous injection of regular insulin.  Blood sugar on presentation was 321 with subsequent mild improvement to 252.    At the time of my encounter, she is resting fairly comfortably at this time stating that her initial blurry vision due to high blood sugars has improved.  Denies headaches at this time.  States she is compliant with her home basal insulin and Metformin regimen.  Overall, she remains pleasant and hopeful spirits.      Review of Systems:    Review of Systems - A thorough 12 point review systems was conducted.  Pertinent positives and negatives are mentioned in the history of present illness.      Past Medical and Surgical History:     Past Medical History:   Diagnosis Date    Hypertension        History reviewed. No pertinent surgical history.      Medications & Allergies:    Prior to Admission medications    Not on File         Allergies: No Known Allergies      Social History:    Substance Use History:   Social History     Substance and Sexual Activity   Alcohol Use Not Currently     Social History     Tobacco Use   Smoking Status Never   Smokeless Tobacco Never     Social History     Substance and Sexual Activity   Drug Use Not Currently         Family History:    Noncontributory      Physical Exam:     Vitals:   Blood Pressure: 95/59 (06/03/24 1850)  Pulse: 104 (06/03/24 1850)  Temperature: 98.8 °F (37.1 °C) (06/03/24 1434)  Temp Source: Oral (06/03/24 1434)  Respirations: 20 (06/03/24 1850)  Height: 5' 4\" (162.6 cm) (06/03/24 1426)  Weight - Scale: 101 kg (222 lb 10.6 oz) (06/03/24 1426)  SpO2: 95 % (06/03/24 1850)      GENERAL Obese without acute distress currently   HEAD   Normocephalic - atraumatic   EYES Nonicteric   MOUTH   Mucosa moist   NECK   Supple - full range of motion   CARDIAC Rate controlled   PULMONARY Fairly clear to auscultation   ABDOMEN   Soft - nontender/nondistended - active bowel sounds "   MUSCULOSKELETAL   Motor strength/range of motion intact   NEUROLOGIC   Alert/oriented at baseline   SKIN   Chronic wrinkles/blemishes    PSYCHIATRIC   Mood/affect stable         Additional Data:     Labs & Recent Cultures:    Results from last 7 days   Lab Units 06/03/24  1459   WBC Thousand/uL 10.23*   HEMOGLOBIN g/dL 13.2   HEMATOCRIT % 43.3   PLATELETS Thousands/uL 397*   SEGS PCT % 69   LYMPHO PCT % 19   MONO PCT % 9   EOS PCT % 2     Results from last 7 days   Lab Units 06/03/24  1459   SODIUM mmol/L 135   POTASSIUM mmol/L 3.7   CHLORIDE mmol/L 99   CO2 mmol/L 19*   BUN mg/dL 29*   CREATININE mg/dL 1.49*   ANION GAP mmol/L 17*   CALCIUM mg/dL 8.8   ALBUMIN g/dL 3.9   TOTAL BILIRUBIN mg/dL 0.39   ALK PHOS U/L 62   ALT U/L 20   AST U/L 17   GLUCOSE RANDOM mg/dL 321*         Results from last 7 days   Lab Units 06/03/24  1903 06/03/24  1734   POC GLUCOSE mg/dl 195* 252*                         Lines/Drains:  Invasive Devices       Peripheral Intravenous Line  Duration             Peripheral IV 06/03/24 Dorsal (posterior);Right Hand <1 day    Peripheral IV 06/03/24 Left Antecubital <1 day                      Imaging:     XR chest 1 view portable    Result Date: 6/3/2024  Narrative: XR CHEST PORTABLE INDICATION: weakness. COMPARISON: CXR 12/01/2015. FINDINGS: Clear lungs. No pneumothorax or pleural effusion. Normal cardiomediastinal silhouette. Bones are unremarkable for age. Normal upper abdomen.     Impression: No acute cardiopulmonary disease. Workstation performed: [id number]                   ZORAN MORALEZ MD   Hospitalist - [facility] Internal Medicine          ** Please Note: This note is constructed using a voice recognition dictation system.  An occasional wrong word/phrase or “sound-a-like” substitution may have been picked up by dictation device due to the inherent limitations of voice recognition software.  Read the chart carefully and recognize, using reasonable context, where substitutions may have  occurred.**

## 2024-06-03 NOTE — ASSESSMENT & PLAN NOTE
Hold HCTZ/ACEI in the setting of elevated creatinine and borderline hypotensive episodes   Resume when able

## 2024-06-04 ENCOUNTER — APPOINTMENT (INPATIENT)
Dept: ULTRASOUND IMAGING | Facility: HOSPITAL | Age: 49
DRG: 638 | End: 2024-06-04
Payer: COMMERCIAL

## 2024-06-04 ENCOUNTER — APPOINTMENT (INPATIENT)
Dept: MRI IMAGING | Facility: HOSPITAL | Age: 49
DRG: 638 | End: 2024-06-04
Payer: COMMERCIAL

## 2024-06-04 ENCOUNTER — APPOINTMENT (INPATIENT)
Dept: NON INVASIVE DIAGNOSTICS | Facility: HOSPITAL | Age: 49
DRG: 638 | End: 2024-06-04
Payer: COMMERCIAL

## 2024-06-04 PROBLEM — R42 LIGHTHEADEDNESS: Status: ACTIVE | Noted: 2024-06-04

## 2024-06-04 LAB
ANION GAP SERPL CALCULATED.3IONS-SCNC: 8 MMOL/L (ref 4–13)
ATRIAL RATE: 124 BPM
ATRIAL RATE: 137 BPM
ATRIAL RATE: 82 BPM
BASOPHILS # BLD AUTO: 0.02 THOUSANDS/ÂΜL (ref 0–0.1)
BASOPHILS # BLD AUTO: 0.02 THOUSANDS/ÂΜL (ref 0–0.1)
BASOPHILS NFR BLD AUTO: 0 % (ref 0–1)
BASOPHILS NFR BLD AUTO: 0 % (ref 0–1)
BILIRUB UR QL STRIP: NEGATIVE
BUN SERPL-MCNC: 24 MG/DL (ref 5–25)
CALCIUM SERPL-MCNC: 8 MG/DL (ref 8.4–10.2)
CARDIAC TROPONIN I PNL SERPL HS: 409 NG/L (ref 8–18)
CHLORIDE SERPL-SCNC: 102 MMOL/L (ref 96–108)
CLARITY UR: CLEAR
CO2 SERPL-SCNC: 26 MMOL/L (ref 21–32)
COLOR UR: YELLOW
CREAT SERPL-MCNC: 0.91 MG/DL (ref 0.6–1.3)
EOSINOPHIL # BLD AUTO: 0.13 THOUSAND/ÂΜL (ref 0–0.61)
EOSINOPHIL # BLD AUTO: 0.21 THOUSAND/ÂΜL (ref 0–0.61)
EOSINOPHIL NFR BLD AUTO: 2 % (ref 0–6)
EOSINOPHIL NFR BLD AUTO: 4 % (ref 0–6)
ERYTHROCYTE [DISTWIDTH] IN BLOOD BY AUTOMATED COUNT: 15.9 % (ref 11.6–15.1)
ERYTHROCYTE [DISTWIDTH] IN BLOOD BY AUTOMATED COUNT: 15.9 % (ref 11.6–15.1)
FERRITIN SERPL-MCNC: 72 NG/ML (ref 11–307)
GFR SERPL CREATININE-BSD FRML MDRD: 73 ML/MIN/1.73SQ M
GLUCOSE SERPL-MCNC: 128 MG/DL (ref 65–140)
GLUCOSE SERPL-MCNC: 129 MG/DL (ref 65–140)
GLUCOSE SERPL-MCNC: 146 MG/DL (ref 65–140)
GLUCOSE SERPL-MCNC: 164 MG/DL (ref 65–140)
GLUCOSE SERPL-MCNC: 205 MG/DL (ref 65–140)
GLUCOSE SERPL-MCNC: 255 MG/DL (ref 65–140)
GLUCOSE UR STRIP-MCNC: NEGATIVE MG/DL
HCT VFR BLD AUTO: 32.5 % (ref 34.8–46.1)
HCT VFR BLD AUTO: 34.4 % (ref 34.8–46.1)
HGB BLD-MCNC: 10.4 G/DL (ref 11.5–15.4)
HGB BLD-MCNC: 9.9 G/DL (ref 11.5–15.4)
HGB UR QL STRIP.AUTO: NEGATIVE
IMM GRANULOCYTES # BLD AUTO: 0.01 THOUSAND/UL (ref 0–0.2)
IMM GRANULOCYTES # BLD AUTO: 0.02 THOUSAND/UL (ref 0–0.2)
IMM GRANULOCYTES NFR BLD AUTO: 0 % (ref 0–2)
IMM GRANULOCYTES NFR BLD AUTO: 0 % (ref 0–2)
IRON SATN MFR SERPL: 12 % (ref 15–50)
IRON SERPL-MCNC: 36 UG/DL (ref 50–212)
KETONES UR STRIP-MCNC: NEGATIVE MG/DL
LEUKOCYTE ESTERASE UR QL STRIP: NEGATIVE
LYMPHOCYTES # BLD AUTO: 2.29 THOUSANDS/ÂΜL (ref 0.6–4.47)
LYMPHOCYTES # BLD AUTO: 2.5 THOUSANDS/ÂΜL (ref 0.6–4.47)
LYMPHOCYTES NFR BLD AUTO: 35 % (ref 14–44)
LYMPHOCYTES NFR BLD AUTO: 41 % (ref 14–44)
MCH RBC QN AUTO: 19.9 PG (ref 26.8–34.3)
MCH RBC QN AUTO: 19.9 PG (ref 26.8–34.3)
MCHC RBC AUTO-ENTMCNC: 30.2 G/DL (ref 31.4–37.4)
MCHC RBC AUTO-ENTMCNC: 30.5 G/DL (ref 31.4–37.4)
MCV RBC AUTO: 65 FL (ref 82–98)
MCV RBC AUTO: 66 FL (ref 82–98)
MONOCYTES # BLD AUTO: 0.64 THOUSAND/ÂΜL (ref 0.17–1.22)
MONOCYTES # BLD AUTO: 1 THOUSAND/ÂΜL (ref 0.17–1.22)
MONOCYTES NFR BLD AUTO: 11 % (ref 4–12)
MONOCYTES NFR BLD AUTO: 14 % (ref 4–12)
NEUTROPHILS # BLD AUTO: 2.49 THOUSANDS/ÂΜL (ref 1.85–7.62)
NEUTROPHILS # BLD AUTO: 3.53 THOUSANDS/ÂΜL (ref 1.85–7.62)
NEUTS SEG NFR BLD AUTO: 44 % (ref 43–75)
NEUTS SEG NFR BLD AUTO: 49 % (ref 43–75)
NITRITE UR QL STRIP: NEGATIVE
NRBC BLD AUTO-RTO: 0 /100 WBCS
NRBC BLD AUTO-RTO: 0 /100 WBCS
P AXIS: 27 DEGREES
P AXIS: 39 DEGREES
P AXIS: 42 DEGREES
PH UR STRIP.AUTO: 6.5 [PH]
PHOSPHATE SERPL-MCNC: 2.9 MG/DL (ref 2.7–4.5)
PLATELET # BLD AUTO: 267 THOUSANDS/UL (ref 149–390)
PLATELET # BLD AUTO: 279 THOUSANDS/UL (ref 149–390)
PMV BLD AUTO: 9.7 FL (ref 8.9–12.7)
PMV BLD AUTO: 9.7 FL (ref 8.9–12.7)
POTASSIUM SERPL-SCNC: 3.7 MMOL/L (ref 3.5–5.3)
PR INTERVAL: 120 MS
PR INTERVAL: 122 MS
PR INTERVAL: 160 MS
PROT UR STRIP-MCNC: NEGATIVE MG/DL
QRS AXIS: 36 DEGREES
QRS AXIS: 49 DEGREES
QRS AXIS: 52 DEGREES
QRSD INTERVAL: 78 MS
QRSD INTERVAL: 80 MS
QRSD INTERVAL: 84 MS
QT INTERVAL: 290 MS
QT INTERVAL: 308 MS
QT INTERVAL: 394 MS
QTC INTERVAL: 437 MS
QTC INTERVAL: 442 MS
QTC INTERVAL: 460 MS
RBC # BLD AUTO: 4.97 MILLION/UL (ref 3.81–5.12)
RBC # BLD AUTO: 5.22 MILLION/UL (ref 3.81–5.12)
SODIUM SERPL-SCNC: 136 MMOL/L (ref 135–147)
SP GR UR STRIP.AUTO: 1.01 (ref 1–1.03)
T WAVE AXIS: -14 DEGREES
T WAVE AXIS: -20 DEGREES
T WAVE AXIS: 33 DEGREES
TIBC SERPL-MCNC: 306 UG/DL (ref 250–450)
UIBC SERPL-MCNC: 270 UG/DL (ref 155–355)
UROBILINOGEN UR QL STRIP.AUTO: 0.2 E.U./DL
VENTRICULAR RATE: 124 BPM
VENTRICULAR RATE: 137 BPM
VENTRICULAR RATE: 82 BPM
WBC # BLD AUTO: 5.66 THOUSAND/UL (ref 4.31–10.16)
WBC # BLD AUTO: 7.2 THOUSAND/UL (ref 4.31–10.16)

## 2024-06-04 PROCEDURE — 82948 REAGENT STRIP/BLOOD GLUCOSE: CPT

## 2024-06-04 PROCEDURE — 99222 1ST HOSP IP/OBS MODERATE 55: CPT | Performed by: INTERNAL MEDICINE

## 2024-06-04 PROCEDURE — 81003 URINALYSIS AUTO W/O SCOPE: CPT | Performed by: INTERNAL MEDICINE

## 2024-06-04 PROCEDURE — 85025 COMPLETE CBC W/AUTO DIFF WBC: CPT | Performed by: INTERNAL MEDICINE

## 2024-06-04 PROCEDURE — 93010 ELECTROCARDIOGRAM REPORT: CPT | Performed by: INTERNAL MEDICINE

## 2024-06-04 PROCEDURE — 80048 BASIC METABOLIC PNL TOTAL CA: CPT | Performed by: INTERNAL MEDICINE

## 2024-06-04 PROCEDURE — 99232 SBSQ HOSP IP/OBS MODERATE 35: CPT | Performed by: INTERNAL MEDICINE

## 2024-06-04 PROCEDURE — 82728 ASSAY OF FERRITIN: CPT | Performed by: INTERNAL MEDICINE

## 2024-06-04 PROCEDURE — 84100 ASSAY OF PHOSPHORUS: CPT | Performed by: INTERNAL MEDICINE

## 2024-06-04 PROCEDURE — 84484 ASSAY OF TROPONIN QUANT: CPT | Performed by: INTERNAL MEDICINE

## 2024-06-04 PROCEDURE — 76775 US EXAM ABDO BACK WALL LIM: CPT

## 2024-06-04 PROCEDURE — 83540 ASSAY OF IRON: CPT | Performed by: INTERNAL MEDICINE

## 2024-06-04 PROCEDURE — 93306 TTE W/DOPPLER COMPLETE: CPT

## 2024-06-04 PROCEDURE — 93005 ELECTROCARDIOGRAM TRACING: CPT

## 2024-06-04 PROCEDURE — 70551 MRI BRAIN STEM W/O DYE: CPT

## 2024-06-04 PROCEDURE — 83550 IRON BINDING TEST: CPT | Performed by: INTERNAL MEDICINE

## 2024-06-04 PROCEDURE — 82652 VIT D 1 25-DIHYDROXY: CPT | Performed by: INTERNAL MEDICINE

## 2024-06-04 PROCEDURE — 93306 TTE W/DOPPLER COMPLETE: CPT | Performed by: INTERNAL MEDICINE

## 2024-06-04 RX ORDER — INSULIN LISPRO 100 [IU]/ML
3 INJECTION, SOLUTION INTRAVENOUS; SUBCUTANEOUS
Status: DISCONTINUED | OUTPATIENT
Start: 2024-06-04 | End: 2024-06-08 | Stop reason: HOSPADM

## 2024-06-04 RX ORDER — ENOXAPARIN SODIUM 100 MG/ML
40 INJECTION SUBCUTANEOUS
Status: DISCONTINUED | OUTPATIENT
Start: 2024-06-04 | End: 2024-06-08 | Stop reason: HOSPADM

## 2024-06-04 RX ORDER — MAGNESIUM SULFATE HEPTAHYDRATE 40 MG/ML
2 INJECTION, SOLUTION INTRAVENOUS ONCE
Status: COMPLETED | OUTPATIENT
Start: 2024-06-04 | End: 2024-06-04

## 2024-06-04 RX ADMIN — MAGNESIUM SULFATE HEPTAHYDRATE 2 G: 40 INJECTION, SOLUTION INTRAVENOUS at 02:20

## 2024-06-04 RX ADMIN — SODIUM CHLORIDE, SODIUM LACTATE, POTASSIUM CHLORIDE, AND CALCIUM CHLORIDE 150 ML/HR: .6; .31; .03; .02 INJECTION, SOLUTION INTRAVENOUS at 02:22

## 2024-06-04 RX ADMIN — ACETAMINOPHEN 650 MG: 325 TABLET, FILM COATED ORAL at 01:05

## 2024-06-04 RX ADMIN — SODIUM CHLORIDE, SODIUM LACTATE, POTASSIUM CHLORIDE, AND CALCIUM CHLORIDE 100 ML/HR: .6; .31; .03; .02 INJECTION, SOLUTION INTRAVENOUS at 13:11

## 2024-06-04 RX ADMIN — INSULIN LISPRO 1 UNITS: 100 INJECTION, SOLUTION INTRAVENOUS; SUBCUTANEOUS at 16:43

## 2024-06-04 RX ADMIN — ENOXAPARIN SODIUM 40 MG: 40 INJECTION SUBCUTANEOUS at 09:54

## 2024-06-04 RX ADMIN — INSULIN LISPRO 3 UNITS: 100 INJECTION, SOLUTION INTRAVENOUS; SUBCUTANEOUS at 12:27

## 2024-06-04 RX ADMIN — INSULIN LISPRO 3 UNITS: 100 INJECTION, SOLUTION INTRAVENOUS; SUBCUTANEOUS at 09:54

## 2024-06-04 RX ADMIN — PERFLUTREN 0.4 ML/MIN: 6.52 INJECTION, SUSPENSION INTRAVENOUS at 15:00

## 2024-06-04 RX ADMIN — ACETAMINOPHEN 650 MG: 325 TABLET, FILM COATED ORAL at 21:39

## 2024-06-04 RX ADMIN — ASPIRIN 81 MG: 81 TABLET, COATED ORAL at 09:54

## 2024-06-04 RX ADMIN — INSULIN GLARGINE 25 UNITS: 100 INJECTION, SOLUTION SUBCUTANEOUS at 21:39

## 2024-06-04 RX ADMIN — INSULIN LISPRO 3 UNITS: 100 INJECTION, SOLUTION INTRAVENOUS; SUBCUTANEOUS at 16:43

## 2024-06-04 RX ADMIN — CYANOCOBALAMIN TAB 500 MCG 500 MCG: 500 TAB at 09:54

## 2024-06-04 NOTE — UTILIZATION REVIEW
NOTIFICATION OF INPATIENT ADMISSION   AUTHORIZATION REQUEST   SERVICING FACILITY:   [place] [address]  Tax ID: [id number]  NPI: [id number] ATTENDING PROVIDER:  Attending Name and NPI#: Kitty Salinas Md [[id number]]  Address: [address] [place] [address]  Phone:  [phone]     ADMISSION INFORMATION:  Place of Service: Inpatient [facility]  Place of Service Code: 21  Inpatient Admission Date/Time: 6/3/24  5:11 PM  Discharge Date/Time: No discharge date for patient encounter.  Admitting Diagnosis Code/Description:  Vomiting [R11.10]  Tachycardia [R00.0]  Body aches [R52]  Hyperglycemia due to diabetes mellitus (HCC) [E11.65]     UTILIZATION REVIEW CONTACT:  Lilian Barrios Utilization   Network Utilization Review Department  Phone: [phone]  Fax: [phone]  Email: [email or web address]  Contact for approvals/pending authorizations, clinical reviews, and discharge.     PHYSICIAN ADVISORY SERVICES:  Medical Necessity Denial & Bmli-hq-Stos Review  Phone: [phone]  Fax: [phone]  Email: [email or web address]     DISCHARGE SUPPORT TEAM:  For Patients Discharge Needs & Updates  Phone: [phone] opt. 2 Fax: [phone]  Email: [email or web address]

## 2024-06-04 NOTE — PLAN OF CARE
Problem: PAIN - ADULT  Goal: Verbalizes/displays adequate comfort level or baseline comfort level  Description: Interventions:  - Encourage patient to monitor pain and request assistance  - Assess pain using appropriate pain scale  - Administer analgesics based on type and severity of pain and evaluate response  - Implement non-pharmacological measures as appropriate and evaluate response  - Consider cultural and social influences on pain and pain management  - Notify physician/advanced practitioner if interventions unsuccessful or patient reports new pain  Outcome: Progressing     Problem: INFECTION - ADULT  Goal: Absence or prevention of progression during hospitalization  Description: INTERVENTIONS:  - Assess and monitor for signs and symptoms of infection  - Monitor lab/diagnostic results  - Monitor all insertion sites, i.e. indwelling lines, tubes, and drains  - Monitor endotracheal if appropriate and nasal secretions for changes in amount and color  - [place] appropriate cooling/warming therapies per order  - Administer medications as ordered  - Instruct and encourage patient and family to use good hand hygiene technique  - Identify and instruct in appropriate isolation precautions for identified infection/condition  Outcome: Progressing  Goal: Absence of fever/infection during neutropenic period  Description: INTERVENTIONS:  - Monitor WBC    Outcome: Progressing     Problem: SAFETY ADULT  Goal: Patient will remain free of falls  Description: INTERVENTIONS:  - Educate patient/family on patient safety including physical limitations  - Instruct patient to call for assistance with activity   - Consult OT/PT to assist with strengthening/mobility   - Keep Call bell within reach  - Keep bed low and locked with side rails adjusted as appropriate  - Keep care items and personal belongings within reach  - Initiate and maintain comfort rounds  - Make Fall Risk Sign visible to staff  - Offer Toileting every 2 Hours,  in advance of need  - Initiate/Maintain bed alarm  - Obtain necessary fall risk management equipment:   - Apply yellow socks and bracelet for high fall risk patients  - Consider moving patient to room near nurses station  Outcome: Progressing  Goal: Maintain or return to baseline ADL function  Description: INTERVENTIONS:  -  Assess patient's ability to carry out ADLs; assess patient's baseline for ADL function and identify physical deficits which impact ability to perform ADLs (bathing, care of mouth/teeth, toileting, grooming, dressing, etc.)  - Assess/evaluate cause of self-care deficits   - Assess range of motion  - Assess patient's mobility; develop plan if impaired  - Assess patient's need for assistive devices and provide as appropriate  - Encourage maximum independence but intervene and supervise when necessary  - Involve family in performance of ADLs  - Assess for home care needs following discharge   - Consider OT consult to assist with ADL evaluation and planning for discharge  - Provide patient education as appropriate  Outcome: Progressing  Goal: Maintains/Returns to pre admission functional level  Description: INTERVENTIONS:  - Perform AM-PAC 6 Click Basic Mobility/ Daily Activity assessment daily.  - Set and communicate daily mobility goal to care team and patient/family/caregiver.   - Collaborate with rehabilitation services on mobility goals if consulted  - Perform Range of Motion 3 times a day.  - Reposition patient every 2 hours.  - Dangle patient 3 times a day  - Stand patient 3 times a day  - Ambulate patient 3 times a day  - Out of bed to chair 3 times a day   - Out of bed for meals 3 times a day  - Out of bed for toileting  - Record patient progress and toleration of activity level   Outcome: Progressing     Problem: DISCHARGE PLANNING  Goal: Discharge to home or other facility with appropriate resources  Description: INTERVENTIONS:  - Identify barriers to discharge w/patient and caregiver  -  Arrange for needed discharge resources and transportation as appropriate  - Identify discharge learning needs (meds, wound care, etc.)  - Arrange for interpretive services to assist at discharge as needed  - Refer to Case Management Department for coordinating discharge planning if the patient needs post-hospital services based on physician/advanced practitioner order or complex needs related to functional status, cognitive ability, or social support system  Outcome: Progressing     Problem: Knowledge Deficit  Goal: Patient/family/caregiver demonstrates understanding of disease process, treatment plan, medications, and discharge instructions  Description: Complete learning assessment and assess knowledge base.  Interventions:  - Provide teaching at level of understanding  - Provide teaching via preferred learning methods  Outcome: Progressing

## 2024-06-04 NOTE — CONSULTS
[facility] Cardiology  CONSULT    Patient Name: Faye Samuels  Patient MRN: [id number]  Admission Date: 6/3/2024  2:20 PM  Attending Provider: Maggi Calloway MD  Service: Cardiology    Reason for consult: Elevated troponin    HPI    This is a 48 y.o. female with a past medical history of hypertension and diabetes mellitus. She presented with complaints of generalized weakness, nausea, and vomiting. Labs on presentation pertinent for AGMA, elevated beta hydroxybutyrate, normal pH on VBG, creatinine 1.49. Blood glucose level 213. ECG demonstrated sinus tachycardia, septal infarct pattern, inferior TWI. HS troponin 25, 273, 361, 407. Cardiology was consulted for elevated troponin.     She denies any recent fever, chills, rhinorrhea, sore throat, nausea, vomiting, diarrhea. She denies any current or preceding chest pain, palpitations, shortness of breath with exertion, orthopnea, PND, leg edema.     Fam hx pertinent for coronary artery disease. Father has first MI early 50s. Brother had coronary stents around 48 years of age.     She is a lifetime non smoker. No history of illicit drug use.    Review of Systems  10 point review of systems negative except as noted in HPI    Past Medical History:   Diagnosis Date    Hypertension        History reviewed. No pertinent surgical history.      Social History     Tobacco Use    Smoking status: Never    Smokeless tobacco: Never   Substance Use Topics    Alcohol use: Not Currently       Vitals:    06/04/24 0645   BP: 110/65   Pulse: 74   Resp: 20   Temp: 97.8 °F (36.6 °C)   SpO2: 98%        I/O last 3 completed shifts:  In: 1480 [P.O.:480; IV Piggyback:1000]  Out: 900 [Urine:900]  No intake/output data recorded.       Oxygen:  O2 Device: None (Room air)    Physical Exam  General Appearance: Alert, cooperative, no distress, appears stated age  Head: Normocephalic, without obvious abnormality, atraumatic  Eyes: PERRL, EOMI, sclerae anicteric  Neck: No carotid bruit or JVD  Lungs:  Clear to auscultation bilaterally, respirations unlabored, no wheezes, rales  Heart: RRR, S1 and S2 normal, no murmur, rub or gallop  Abdomen: Soft, non-tender, bowel sounds active  Extremities: Extremities normal, atraumatic, no edema  Pulses: 2+ and symmetric all extremities  Skin: Warm and dry without and rashes or lesions on exposed skin      Current Medications:  Scheduled Meds:  Current Facility-Administered Medications   Medication Dose Route Frequency Provider Last Rate    acetaminophen  650 mg Oral Q6H PRN Maggi Calloway MD      hydrALAZINE  5 mg Intravenous Q6H PRN Maggi Calloway MD      insulin glargine  25 Units Subcutaneous HS Irwin French MD      insulin lispro  1-6 Units Subcutaneous TID AC Irwin French MD      lactated ringers  150 mL/hr Intravenous Continuous Irwin French  mL/hr (06/04/24 0222)    trimethobenzamide  200 mg Intramuscular Q6H PRN Maggi Calloway MD       Continuous Infusions:lactated ringers, 150 mL/hr, Last Rate: 150 mL/hr (06/04/24 0222)      PRN Meds:.  acetaminophen    hydrALAZINE    trimethobenzamide    Laboratory Studies:  Lab Results   Component Value Date    WBC 5.66 06/04/2024    HGB 10.4 (L) 06/04/2024    HCT 34.4 (L) 06/04/2024    MCV 66 (L) 06/04/2024     06/04/2024       Lab Results   Component Value Date    CALCIUM 8.0 (L) 06/04/2024    SODIUM 136 06/04/2024    K 3.7 06/04/2024    CO2 26 06/04/2024     06/04/2024    BUN 24 06/04/2024    CREATININE 0.91 06/04/2024       Lab Results   Component Value Date    HGBA1C 13.8 (H) 06/03/2024     CARDIAC IMAGING:  Echocardiogram: No prior     ECG: All ECGs personally reviewed and interpreted    IMAGING  XR chest 1 view portable   ED Interpretation   CXR; NO acute cardiopulmonary findings      Final Result      No acute cardiopulmonary disease.            Workstation performed: [id number]         US kidney and bladder with pvr    (Results Pending)        Assessment / Plan  This is a 48 y.o.  female with diabetes mellitus and hypertension who presented with    #. Generalized weakness  #. N/V  #. Hyperglycemia  #. AGMA  #. LEONORA  #. Elevated troponin  #. Diabetes mellitus, uncontrolled: HbA1c 13.8%    Volume depleted on presentation likely due to hyperglycemia.  Sinus tachycardia in the setting of this and metabolic derangements.  Initial ECG shows septal infarct pattern which I suspect is due to lead placement.  ECG also showed moderate T wave inversions in the inferior leads.  She has no preceding or current cardiac or pulmonary complaints.  There is no evidence for acute coronary syndrome.  Non-MI troponin elevation in the setting of her presenting complaints.  No further tachycardia. Transthoracic echocardiogram will be completed and if unremarkable, no further inpatient cardiac workup necessary.  Given her diabetes, would consider checking baseline lipid panel and assess for appropriate statin dose.    Principal Problem:    Generalized weakness  Active Problems:    Type 2 diabetes mellitus with hyperglycemia (HCC)    Anion gap metabolic acidosis    Elevated serum creatinine    Abnormal EKG    Essential hypertension    Morbid obesity (HCC)      Code Status: Level 1 - Full Code    Alyce Hsieh MD

## 2024-06-04 NOTE — ASSESSMENT & PLAN NOTE
Presenting creatinine of 1.49 (baseline unknown)  Continue IV fluid hydration and monitor renal function and urine output  Limit/when nephrotoxins and hypotension as possible  Renal ultrasound-is normal

## 2024-06-04 NOTE — ASSESSMENT & PLAN NOTE
Stated that she had episode of lightheadedness and blurred vision in April 2024 while at work when she was noted to have elevated blood pressure and however she did not come to the emergency room to get evaluated  Patient states that she had stopped taking her antihypertensive but recently she restarted them  Currently denies of lightheadedness or blurred vision, but she thinks that she may have had a mini stroke in April with strong family history of strokes, would resume aspirin daily,   MRI of the brain was reviewed shows possibility of intracranial hypertension, neurology was consulted and recommended lumbar puncture which will be scheduled on Friday and also MRI of the brain with contrast which shows prominent draining vein/venous varices dorsal to the left occipital condyle corresponding to the hyperintense T2/FLAIR focus noted on the noncontrast MRI exam  No acute pathological intra-axial enhancement noted.  lumbar puncture to be done on Friday by interventional radiology, will hydrate the patient prior to the procedure and also hold Lovenox a.m. dose, CSF labs ordered, need to follow-up the results  Continue aspirin and high-dose statin as per neurology recommendation  CTA head and neck was obtained shows no acute changes however shows dental caries which needs outpatient follow-up with dentist  Also recommend loop recorder or Zio patch at the time of discharge.

## 2024-06-04 NOTE — ASSESSMENT & PLAN NOTE
Presenting blood glucose of 321 -> 252 status post IV fluid hydration and a one-time regular insulin SC injection  In light of mild beta hydroxybutyrate elevation and anion gap metabolic acidosis, initiated on an insulin drip along with IV fluids (bicarbonate infusion)  Monitor serial Accu-Cheks with hopeful plan to wean off insulin drip to basal/prandial insulin in the next 12-24 hours  Monitor/replete serum potassium/phosphate deficiencies if present  Will require ambulatory referral to endocrinology on discharge    [id number] of 13.8  Blood sugars are fairly controlled in the hospital, continue current insulin regimen, monitor Accu-Cheks.   yes

## 2024-06-04 NOTE — QUICK NOTE
Noted to have low blood pressure likely in setting of volume depletion with systolic 83's.  Responded to IV fluid.  Also noted to have controlled blood sugar and patient feeling hungry started on basal bolus insulin regimen. AG closed.     Stop Insulin drip in 2 hrs after giving lantus.  Start home Lantus 25 units at bedtime along with sliding scale coverage  Consider starting mealtime insulin if still blood pressure is elevated  Continue aggressive hydration with  cc/h  Transfer to [facility]  Continue telemetry monitoring  Trend troponin  Replace magnesium  Monitor renal function

## 2024-06-04 NOTE — UTILIZATION REVIEW
"Initial Clinical Review    Admission: Date/Time/Statement:   Admission Orders (From admission, onward)       Ordered        06/03/24 1719  INPATIENT ADMISSION  Once                          Orders Placed This Encounter   Procedures    INPATIENT ADMISSION     Standing Status:   Standing     Number of Occurrences:   1     Order Specific Question:   Level of Care     Answer:   Level 2 Stepdown / HOT [14]     Order Specific Question:   Estimated length of stay     Answer:   More than 2 Midnights     Order Specific Question:   Certification     Answer:   I certify that inpatient services are medically necessary for this patient for a duration of greater than two midnights. See H&P and MD Progress Notes for additional information about the patient's course of treatment.     ED Arrival Information       Expected   -    Arrival   6/3/2024 14:18    Acuity   Urgent              Means of arrival   Walk-In    Escorted by   Family Member    Service   Hospitalist    Admission type   Emergency              Arrival complaint   blood sugar high dizziness vomitting             Chief Complaint   Patient presents with    Generalized Body Aches     Reports generalized body aches, \"vomiting phlegm\" type 2 diabetic        Initial Presentation: 48 y.o. female presented to ED as inpatient admission for generalized weakness.Past medical history of hypertension and diabetes mellitus.  Patient felt nauseous and she vomited 1 time today. Patient also having diarrhea x 1 today. Patient has no fever. Patient is diabetic and she is on metformin and insulin and she stated that she is compliant with her medications. Patient stated that in the last few days her blood sugar is going up high. Patient to contact her PMD today who told her to go to the ER to rule out with ketones in the blood on the urine. Exam benign. Blood work to have an anion gap metabolic acidosis with a mildly elevated beta hydroxybutyrate, however, pH on blood gas was normal. " She was given a bolus of IV fluids along with a one-time subcutaneous injection of regular insulin. Blood sugar on presentation was 321 with subsequent mild improvement to 252.   Plan PT/OT telemetry,blood sugars q 1 hrs,In light of mild beta hydroxybutyrate elevation and anion gap metabolic acidosis, initiated on an insulin drip along with IV fluids (bicarbonate infusion). Monitor/replete serum potassium/phosphate deficiencies if present  Anion gap acidosis noted on admission -> patient beta-hydroxybutyrate with a normal pH on blood gas and supportive careEKG with evidence of Q waves and nonspecific T wave inversions in III and aVF ED provider discussed with on-call cardiologist who feel nonstick changes are probably secondary to metabolic derangements as above -> continue IV fluid hydration and optimize blood sugar control  Telemetry and electrolytes monitoring - repeat EKG in the morning Await official cardiology consultation. Stop Insulin drip in 2 hrs after giving lantus.Start home Lantus 25 units at bedtime along with sliding scale coverage Consider starting mealtime insulin if still blood pressure is elevated Continue aggressive hydration with  cc/h        Anticipated Length of Stay/Certification Statement: Patient will be admitted on an Inpatient basis with an anticipated length of stay of greater than 2 midnights.   Justification for Hospital Stay: Generalized weakness secondary to uncontrolled diabetes mellitus with elevated creatinine and metabolic acidosis, requiring optimization of blood sugar control, IV fluid hydration, and electrolyte monitoring.     Date: 06-04-24   Day 2: Hold HCTZ/ACEI in the setting of elevated creatinine and borderline hypotensive episodes continue IV fluid hydration and optimize blood sugar control continue IVF,Telemetry awaiting PT/OT Monitor serial Accu-Cheks with hopeful plan to wean off insulin drip to basal/prandial insulin in the next 12-24 hours  Monitor/replete  serum potassium/phosphate deficiencies if present Hold HCTZ/ACEI in the setting of elevated creatinine and borderline hypotensive episodes  Continue IVF to help decrease creatinine, close anion gap, Transition to accu-check ac and hs with SSI  Awaiting ECHO   Subjective:   Patient is feeling better.  Able to tolerate p.o. diet.  No further nausea or vomiting noted.  No acute overnight events noted.  Patient has no further dizziness.  Patient had an episode of dizziness lightheadedness and blurred vision with elevated blood pressure in April 2024 while at work and thought she probably had a stroke however she did not come to the hospital to get evaluated.    Certification Statement: The patient will continue to require additional inpatient hospital stay due to uncontrolled diabetes mellitus type 2, elevated troponin elevated cardiology evaluation     Cardiology consult Fam hx pertinent for coronary artery disease. Father has first MI early 50s. Brother had coronary stents around 48 years of age.   Volume depleted on presentation likely due to hyperglycemia.  Sinus tachycardia in the setting of this and metabolic derangements.  Initial ECG shows septal infarct pattern which I suspect is due to lead placement.  ECG also showed moderate T wave inversions in the inferior leads.  She has no preceding or current cardiac or pulmonary complaints.  There is no evidence for acute coronary syndrome.  Non-MI troponin elevation in the setting of her presenting complaints.  No further tachycardia. Transthoracic echocardiogram will be completed and if unremarkable, no further inpatient cardiac workup necessary.  Given her diabetes, would consider checking baseline lipid panel and assess for appropriate statin dose.           ED Triage Vitals   Temperature Pulse Respirations Blood Pressure SpO2   06/03/24 1434 06/03/24 1434 06/03/24 1434 06/03/24 1434 06/03/24 1434   98.8 °F (37.1 °C) (!) 143 22 120/56 97 %      Temp Source Heart  Rate Source Patient Position - Orthostatic VS BP Location FiO2 (%)   06/03/24 1434 06/03/24 1434 06/03/24 1434 06/03/24 1434 --   Oral Monitor Lying Right arm       Pain Score       06/03/24 1426       10 - Worst Possible Pain          Wt Readings from Last 1 Encounters:   06/03/24 102 kg (225 lb 4 oz)     Additional Vital Signs:     Date/Time Temp Pulse Resp BP MAP (mmHg) SpO2 O2 Device Patient Position - Orthostatic VS   06/04/24 0645 97.8 °F (36.6 °C) 74 20 110/65 88 98 % None (Room air) Lying   06/04/24 0045 -- 84 -- 103/72 -- -- -- Lying   06/03/24 2333 -- 88 -- 92/62 -- 98 % -- Lying   06/03/24 2229 -- -- -- 82/68 Abnormal  -- -- -- --   06/03/24 2222 97.7 °F (36.5 °C) 93 22 -- -- 97 % -- Lying   06/03/24 1926 97.3 °F (36.3 °C) Abnormal  86 22 99/62 -- 97 % -- Lying   06/03/24 1900 -- 109 Abnormal  22 93/62 71 97 % -- --   06/03/24 1850 -- 104 20 95/59 71 95 % -- --   06/03/24 1840 -- 106 Abnormal  18 102/61 75 97 % -- --   06/03/24 1830 -- 105 17 98/53 69 95 % -- --   06/03/24 1820 -- 105 20 94/56 71 95 % -- --   06/03/24 1810 -- 107 Abnormal  16 111/64 84 95 % -- --   06/03/24 1800 -- 104 20 122/60 87 95 % -- --   06/03/24 1750 -- 107 Abnormal  17 116/56 80 95 % -- --   06/03/24 1740 -- 110 Abnormal  20 125/58 83 96 % -- --   06/03/24 1730 -- 110 Abnormal  15 108/57 77 95 % -- --   06/03/24 1726 -- 109 Abnormal  21 124/68 90 95 % -- --   06/03/24 1600 -- 124 Abnormal  22 -- -- 97 % -- --   06/03/24 1445 -- -- -- -- -- -- None (Room air) --         Pertinent Labs/Diagnostic Test Results:   06-04-24    ECHO pending    EKG  Sinus tachycardia  Septal infarct , age undetermined  T wave abnormality, consider inferior ischemia  Abnormal ECG  No previous ECGs available  Sinus tachycardia  Septal infarct , age undetermined  T wave abnormality, consider inferior ischemia  Abnormal ECG        EKG 06-03-24  Sinus tachycardia  Septal infarct (cited on or before 03-JUN-2024)  T wave abnormality, consider inferior  ischemia  Abnormal ECG  When compared with ECG of 03-JUN-2024 14:43, (unconfirmed)  Nonspecific T wave abnormality now evident in Anterior leads  Sinus tachycardia  Septal infarct (cited on or before 03-JUN-2024)  T wave abnormality, consider inferior ischemia  Abnormal ECG    Sinus tachycardia  Septal infarct , age undetermined  T wave abnormality, consider inferior ischemia  Abnormal ECG  No previous ECGs available  Sinus tachycardia  Septal infarct , age undetermined  T wave abnormality, consider inferior ischemia  Abnormal ECG    XR chest 1 view portable   ED Interpretation by Hailey Morin MD (06/03 1554)   CXR; NO acute cardiopulmonary findings      Final Result by Sydney Gilman MD (06/03 1717)      No acute cardiopulmonary disease.            Workstation performed: [id number]          kidney and bladder with pvr    (Results Pending)         Results from last 7 days   Lab Units 06/04/24  0609 06/04/24  0042 06/03/24  1459   WBC Thousand/uL 5.66 7.20 10.23*   HEMOGLOBIN g/dL 10.4* 9.9* 13.2   HEMATOCRIT % 34.4* 32.5* 43.3   PLATELETS Thousands/uL 279 267 397*   TOTAL NEUT ABS Thousands/µL 2.49 3.53 7.21         Results from last 7 days   Lab Units 06/04/24  0609 06/03/24  2322 06/03/24  2043 06/03/24  1459   SODIUM mmol/L 136 136 137 135   POTASSIUM mmol/L 3.7 3.6 3.6 3.7   CHLORIDE mmol/L 102 103 104 99   CO2 mmol/L 26 22 24 19*   ANION GAP mmol/L 8 11 9 17*   BUN mg/dL 24 30* 29* 29*   CREATININE mg/dL 0.91 1.12 1.07 1.49*   EGFR ml/min/1.73sq m 73 57 60 40   CALCIUM mg/dL 8.0* 7.8* 8.2* 8.8   MAGNESIUM mg/dL  --  1.5*  --   --    PHOSPHORUS mg/dL 2.9  --  2.8  --      Results from last 7 days   Lab Units 06/03/24  1459   AST U/L 17   ALT U/L 20   ALK PHOS U/L 62   TOTAL PROTEIN g/dL 7.7   ALBUMIN g/dL 3.9   TOTAL BILIRUBIN mg/dL 0.39     Results from last 7 days   Lab Units 06/04/24  1104 06/04/24  0713 06/04/24  0208 06/03/24  2326 06/03/24  2229 06/03/24  2128 06/03/24  2040 06/03/24 2005  06/03/24  1903 06/03/24  1734   POC GLUCOSE mg/dl 255* 128 146* 187* 202* 137 107 125 195* 252*     Results from last 7 days   Lab Units 06/04/24  0609 06/03/24  2322 06/03/24 2043 06/03/24  1459   GLUCOSE RANDOM mg/dL 129 197* 102 321*         Results from last 7 days   Lab Units 06/03/24  1459   HEMOGLOBIN A1C % 13.8*   EAG mg/dl 349     Beta- Hydroxybutyrate   Date Value Ref Range Status   06/03/2024 0.58 (H) 0.02 - 0.27 mmol/L Final          Results from last 7 days   Lab Units 06/03/24  1514   PH FRANKY  7.392             Results from last 7 days   Lab Units 06/03/24  1911 06/03/24  1759 06/03/24  1459   HS TNI 0HR ng/L  --   --  25   HS TNI 2HR ng/L  --  273*  --    HSTNI D2 ng/L  --  248*  --    HS TNI 4HR ng/L 361*  --   --    HSTNI D4 ng/L 336*  --   --              Results from last 7 days   Lab Units 06/03/24  1459   TSH 3RD GENERATON uIU/mL 4.826*     Results from last 7 days   Lab Units 06/03/24  2322   BNP pg/mL 26           Results from last 7 days   Lab Units 06/04/24  0621   CLARITY UA  Clear   COLOR UA  Yellow   SPEC GRAV UA  1.010   PH UA  6.5   GLUCOSE UA mg/dl Negative   KETONES UA mg/dl Negative   BLOOD UA  Negative   PROTEIN UA mg/dl Negative   NITRITE UA  Negative   BILIRUBIN UA  Negative   UROBILINOGEN UA E.U./dl 0.2   LEUKOCYTES UA  Negative       ED Treatment:   Medication Administration from 06/03/2024 1418 to 06/03/2024 1925         Date/Time Order Dose Route Action     06/03/2024 1507 EDT sodium chloride 0.9 % bolus 1,000 mL 1,000 mL Intravenous New Bag     06/03/2024 1616 EDT insulin regular (HumuLIN R,NovoLIN R) injection 8 Units 8 Units Subcutaneous Given     06/03/2024 1753 EDT insulin regular (HumuLIN R,NovoLIN R) 1 Units/mL in sodium chloride 0.9 % 100 mL infusion 6 Units/hr Intravenous New Bag     06/03/2024 1800 EDT sodium bicarbonate 75 mEq in sodium chloride 0.45 % 1,000 mL infusion 125 mL/hr Intravenous New Bag          Past Medical History:   Diagnosis Date    Hypertension       Present on Admission:  **None**      Admitting Diagnosis: Vomiting [R11.10]  Tachycardia [R00.0]  Body aches [R52]  Hyperglycemia due to diabetes mellitus (HCC) [E11.65]  Age/Sex: 48 y.o. female  Admission Orders:  Scheduled Medications:  aspirin, 81 mg, Oral, Daily  cyanocobalamin, 500 mcg, Oral, Daily  enoxaparin, 40 mg, Subcutaneous, Q24H EMILE  insulin glargine, 25 Units, Subcutaneous, HS  insulin lispro, 1-6 Units, Subcutaneous, TID AC  insulin lispro, 3 Units, Subcutaneous, TID With Meals      Continuous IV Infusions:  lactated ringers, 100 mL/hr, Intravenous, Continuous        Medications 06/03 06/04   insulin regular (HumuLIN R,NovoLIN R) 1 Units/mL in sodium chloride 0.9 % 100 mL infusion  Rate: 0.3-21 mL/hr Dose: 0.3-21 Units/hr  Freq: Titrated Route: IV  Last Dose: 4 Units/hr (06/03/24 2329)  Start: 06/03/24 2115 End: 06/03/24 2341   Admin Instructions:      Order specific questions:       2142 [C]     2329     2341-D/C'd       insulin regular (HumuLIN R,NovoLIN R) 1 Units/mL in sodium chloride 0.9 % 100 mL infusion  Rate: 0.3-21 mL/hr Dose: 0.3-21 Units/hr  Freq: Titrated Route: IV  Last Dose: Stopped (06/03/24 2045)  Start: 06/03/24 1745 End: 06/03/24 2109   Admin Instructions:      Order specific questions:       1753     2005 [C]     2045 2109-D/C'd       lactated ringers infusion  Rate: 100 mL/hr Dose: 100 mL/hr  Freq: Continuous Route: IV  Indications of Use: IV Hydration  Last Dose: 100 mL/hr (06/04/24 0948)  Start: 06/03/24 2115 2130 2248 0209 0222 0948        sodium bicarbonate 100 mEq in sodium chloride 0.45 % 1,000 mL infusion  Rate: 125 mL/hr Dose: 125 mL/hr  Freq: Continuous Route: IV  Start: 06/03/24 1730 End: 06/03/24 1737   Admin Instructions:       1730     1737-D/C'd       sodium bicarbonate 75 mEq in sodium chloride 0.45 % 1,000 mL infusion  Rate: 125 mL/hr Dose: 125 mL/hr  Freq: Continuous Route: IV  Last Dose: 125 mL/hr (06/03/24 1806)  Start: 06/03/24 1800  End: 06/03/24 2109   Admin Instructions:       1806     2109-D/C'd       sodium chloride 0.9 % infusion  Rate: 125 mL/hr Dose: 125 mL/hr  Freq: Continuous Route: IV  Start: 06/03/24 1730 End: 06/03/24 1716 1716-D/C'd                                     PRN Meds:  acetaminophen, 650 mg, Oral, Q6H PRN  hydrALAZINE, 5 mg, Intravenous, Q6H PRN  trimethobenzamide, 200 mg, Intramuscular, Q6H PRN        IP CONSULT TO CARDIOLOGY    Network Utilization Review Department  ATTENTION: Please call with any questions or concerns to [phone] and carefully listen to the prompts so that you are directed to the right person. All voicemails are confidential.   For Discharge needs, contact Care Management DC Support Team at [phone] opt. [phone]  Send all requests for admission clinical reviews, approved or denied determinations and any other requests to dedicated fax number below belonging to the [facility] where the patient is receiving treatment. List of dedicated fax numbers for the Facilities:  FACILITY NAME UR FAX NUMBER   ADMISSION DENIALS (Administrative/Medical Necessity) [phone]   DISCHARGE SUPPORT TEAM (NETWORK) [phone]   PARENT CHILD HEALTH (Maternity/NICU/Pediatrics) [phone]   [facility] [phone]   [facility] [phone]   [facility] [phone]   [facility] [phone]   [facility] [phone]   [facility] [phone]   [facility] [phone]   [facility] [phone]   [facility] [phone]   [facility] [phone]   [facility] [phone]   [facility] [phone]

## 2024-06-04 NOTE — ASSESSMENT & PLAN NOTE
Overall patient feels better and she states she is back to normal levels of strength.  Patient did not require PT and OT evaluation as she has returned completely back to her baseline.  Currently she is independent and ambulatory.

## 2024-06-04 NOTE — ASSESSMENT & PLAN NOTE
Presenting EKG with evidence of Q waves and nonspecific T wave inversions in III and aVF  ED provider discussed with on-call cardiologist who feel nonstick changes are probably secondary to metabolic derangements as above -> continue IV fluid hydration and optimize blood sugar control  Telemetry and electrolytes monitoring - repeat EKG in the morning  Await official cardiology consultation  Baseline high-sensitivity troponin normal -> trend serial sets  6/5-cardiology initiated, EKG showed moderate T wave inversion in leads, no evidence for acute coronary syndrome,  MRI troponin elevation in the setting of hyperglycemia and volume depletion, tachycardia has resolved, 2D echocardiogram is unremarkable, no further inpatient cardiac workup necessary,  Lipid panel abnormal-started on low-dose of statin  Discontinue telemetry.

## 2024-06-04 NOTE — ASSESSMENT & PLAN NOTE
HCTZ and ACE inhibitor's were on hold initially, initially noted to have elevated creatinine.  Currently blood pressure and renal function back to normal.  Will resume ACE inhibitors.  HCTZ can be resumed at the time of discharge

## 2024-06-04 NOTE — PHYSICAL THERAPY NOTE
Physical Therapy Cancellation Note     06/04/24 1632   PT Last Visit   PT Visit Date 06/04/24   Note Type   Note type Cancelled Session   Cancel Reasons Medical status   Additional Comments Pt presents with weakness and body aches. ECG demonstrated sinus tachycardia, septal infarct pattern, inferior TWI.  Noted that troponins are elevated and uptrending.  Cardiology suspects no evidence for acute coronary syndrome, but ordered Transthoracic echocardiogram.  Will hold therapy eval until reading.

## 2024-06-04 NOTE — PROGRESS NOTES
[facility]  Progress Note  Name: Faye Samuels I  MRN: [id number]  Unit/Bed#: MS [id number] I Date of Admission: 6/3/2024   Date of Service: 6/4/2024 I Hospital Day: 1    Assessment & Plan   Lightheadedness  Assessment & Plan  Stated that she had episode of lightheadedness and blurred vision in April 2024 while at work when she was noted to have elevated blood pressure and however she did not come to the emergency room to get evaluated  Patient states that she had stopped taking her antihypertensive but recently she restarted them  Currently denies of lightheadedness or blurred vision, but she thinks that she may have had a mini stroke in April with strong family history of strokes, would resume aspirin daily, also get MRI of the brain     Morbid obesity (HCC)  Assessment & Plan  BMI of 38.22  Lifestyle/diet modifications    Essential hypertension  Assessment & Plan  Hold HCTZ/ACEI in the setting of elevated creatinine and borderline hypotensive episodes   Resume when able     Abnormal EKG  Assessment & Plan  Presenting EKG with evidence of Q waves and nonspecific T wave inversions in III and aVF  ED provider discussed with on-call cardiologist who feel nonstick changes are probably secondary to metabolic derangements as above -> continue IV fluid hydration and optimize blood sugar control  Telemetry and electrolytes monitoring - repeat EKG in the morning  Await official cardiology consultation  Baseline high-sensitivity troponin normal -> trend serial sets    Elevated serum creatinine  Assessment & Plan  Presenting creatinine of 1.49 (baseline unknown)  Continue IV fluid hydration and monitor renal function and urine output  Limit/when nephrotoxins and hypotension as possible  Check renal ultrasound to assess for obstructive etiology    Anion gap metabolic acidosis  Assessment & Plan  Anion gap acidosis noted on admission -> patient beta-hydroxybutyrate with a normal pH on blood gas  Suspect  secondary to intravascular volume depletion and uncontrolled diabetes mellitus (see above)  IV fluid infusion with a bicarbonate drip initiated  Monitor serum bicarbonate/potassium and renal function    Type 2 diabetes mellitus with hyperglycemia (HCC)  Assessment & Plan  Check updated A1c  Presenting blood glucose of 321 -> 252 status post IV fluid hydration and a one-time regular insulin SC injection  In light of mild beta hydroxybutyrate elevation and anion gap metabolic acidosis, initiated on an insulin drip along with IV fluids (bicarbonate infusion)  Monitor serial Accu-Cheks with hopeful plan to wean off insulin drip to basal/prandial insulin in the next 12-24 hours  Monitor/replete serum potassium/phosphate deficiencies if present  Will require ambulatory referral to endocrinology on discharge    * Generalized weakness  Assessment & Plan  Likely multifactorial secondary to uncontrolled diabetes mellitus and sequela as below  Check TSH and B12/vitamin D levels  PT/OT input to be appreciated       Lightheadedness  Assessment & Plan  Stated that she had episode of lightheadedness and blurred vision in April 2024 while at work when she was noted to have elevated blood pressure and however she did not come to the emergency room to get evaluated  Patient states that she had stopped taking her antihypertensive but recently she restarted them  Currently denies of lightheadedness or blurred vision, but she thinks that she may have had a mini stroke in April with strong family history of strokes, would resume aspirin daily, also get MRI of the brain     Morbid obesity (HCC)  Assessment & Plan  BMI of 38.22  Lifestyle/diet modifications    Essential hypertension  Assessment & Plan  Hold HCTZ/ACEI in the setting of elevated creatinine and borderline hypotensive episodes   Resume when able     Abnormal EKG  Assessment & Plan  Presenting EKG with evidence of Q waves and nonspecific T wave inversions in III and aVF  ED  provider discussed with on-call cardiologist who feel nonstick changes are probably secondary to metabolic derangements as above -> continue IV fluid hydration and optimize blood sugar control  Telemetry and electrolytes monitoring - repeat EKG in the morning  Await official cardiology consultation  Baseline high-sensitivity troponin normal -> trend serial sets    Elevated serum creatinine  Assessment & Plan  Presenting creatinine of 1.49 (baseline unknown)  Continue IV fluid hydration and monitor renal function and urine output  Limit/when nephrotoxins and hypotension as possible  Check renal ultrasound to assess for obstructive etiology    Anion gap metabolic acidosis  Assessment & Plan  Anion gap acidosis noted on admission -> patient beta-hydroxybutyrate with a normal pH on blood gas  Suspect secondary to intravascular volume depletion and uncontrolled diabetes mellitus (see above)  IV fluid infusion with a bicarbonate drip initiated  Monitor serum bicarbonate/potassium and renal function    Type 2 diabetes mellitus with hyperglycemia (HCC)  Assessment & Plan  Check updated A1c  Presenting blood glucose of 321 -> 252 status post IV fluid hydration and a one-time regular insulin SC injection  In light of mild beta hydroxybutyrate elevation and anion gap metabolic acidosis, initiated on an insulin drip along with IV fluids (bicarbonate infusion)  Monitor serial Accu-Cheks with hopeful plan to wean off insulin drip to basal/prandial insulin in the next 12-24 hours  Monitor/replete serum potassium/phosphate deficiencies if present  Will require ambulatory referral to endocrinology on discharge    * Generalized weakness  Assessment & Plan  Likely multifactorial secondary to uncontrolled diabetes mellitus and sequela as below  Check TSH and B12/vitamin D levels  PT/OT input to be appreciated        VTE Pharmacologic Prophylaxis:   Pharmacologic: Enoxaparin (Lovenox)      Mechanical VTE Prophylaxis in Place:  Yes    Patient Centered Rounds: I have performed bedside rounds with nursing staff today.    Discussions with Specialists or Other Care Team Provider: Discussed the case with cardiologist    Education and Discussions with Family / Patient: Yes    Time Spent for Care: 45 minutes.  More than 50% of total time spent on counseling and coordination of care as described above.    Current Length of Stay: 1 day(s)    Current Patient Status: Inpatient   Certification Statement: The patient will continue to require additional inpatient hospital stay due to uncontrolled diabetes mellitus type 2, elevated troponin elevated cardiology evaluation    Discharge Plan: Plan for discharge to home in 24 to 48 hours as    Code Status: Level 1 - Full Code      Subjective:   Patient is feeling better.  Able to tolerate p.o. diet.  No further nausea or vomiting noted.  No acute overnight events noted.  Patient has no further dizziness.  Patient had an episode of dizziness lightheadedness and blurred vision with elevated blood pressure in 2024 while at work and thought she probably had a stroke however she did not come to the hospital to get evaluated.  Objective:     Vitals:   Temp (24hrs), Av.6 °F (36.4 °C), Min:97.3 °F (36.3 °C), Max:97.8 °F (36.6 °C)    Temp:  [97.3 °F (36.3 °C)-97.8 °F (36.6 °C)] 97.8 °F (36.6 °C)  HR:  [] 80  Resp:  [15-22] 20  BP: ()/(53-72) 110/65  SpO2:  [95 %-98 %] 98 %  Body mass index is 38.62 kg/m².     Input and Output Summary (last 24 hours):       Intake/Output Summary (Last 24 hours) at 2024 1507  Last data filed at 2024 1201  Gross per 24 hour   Intake 1960 ml   Output 1400 ml   Net 560 ml       Physical Exam:     HEENT-PERRLA, moist oral mucosa,cataract present b/l   Neck-supple, no JVD elevation   Respiratory-equal air entry bilaterally, no rales or rhonchi  Cardiovascular system-S1, S2 heard, no murmur or gallops or rubs  Abdomen-soft, nontender, no guarding or rigidity,  bowel sounds heard  Extremities-no pedal edema  Peripheral pulses palpable  Musculoskeletal-no contractures  Central nervous system-no acute focal neurological deficit ,no sensory or motor deficit noted.  Skin-no rash noted    Additional Data:     Labs:    Results from last 7 days   Lab Units 06/04/24  0609   WBC Thousand/uL 5.66   HEMOGLOBIN g/dL 10.4*   HEMATOCRIT % 34.4*   PLATELETS Thousands/uL 279   SEGS PCT % 44   LYMPHO PCT % 41   MONO PCT % 11   EOS PCT % 4     Results from last 7 days   Lab Units 06/04/24  0609 06/03/24  2043 06/03/24  1459   SODIUM mmol/L 136   < > 135   POTASSIUM mmol/L 3.7   < > 3.7   CHLORIDE mmol/L 102   < > 99   CO2 mmol/L 26   < > 19*   BUN mg/dL 24   < > 29*   CREATININE mg/dL 0.91   < > 1.49*   ANION GAP mmol/L 8   < > 17*   CALCIUM mg/dL 8.0*   < > 8.8   ALBUMIN g/dL  --   --  3.9   TOTAL BILIRUBIN mg/dL  --   --  0.39   ALK PHOS U/L  --   --  62   ALT U/L  --   --  20   AST U/L  --   --  17   GLUCOSE RANDOM mg/dL 129   < > 321*    < > = values in this interval not displayed.         Results from last 7 days   Lab Units 06/04/24  1104 06/04/24  0713 06/04/24  0208 06/03/24  2326 06/03/24  2229 06/03/24  2128 06/03/24  2040 06/03/24  2005 06/03/24  1903 06/03/24  1734   POC GLUCOSE mg/dl 255* 128 146* 187* 202* 137 107 125 195* 252*     Results from last 7 days   Lab Units 06/03/24  1459   HEMOGLOBIN A1C % 13.8*               * I Have Reviewed All Lab Data Listed Above.  * Additional Pertinent Lab Tests Reviewed: All Labs For Current Hospital Admission Reviewed    Imaging:    Imaging Reports Reviewed Today Include: I have personally reviewed pertinent films in PACS.    Imaging Personally Reviewed by Myself Includes:  Same as above    Recent Cultures (last 7 days):           Last 24 Hours Medication List:   Current Facility-Administered Medications   Medication Dose Route Frequency Provider Last Rate    acetaminophen  650 mg Oral Q6H PRN Maggi Calloway MD      aspirin  81 mg Oral  Daily Kitty Salinas MD      cyanocobalamin  500 mcg Oral Daily Kitty Salinas MD      enoxaparin  40 mg Subcutaneous Q24H EMILE Kitty Salinas MD      hydrALAZINE  5 mg Intravenous Q6H PRN Maggi Calloway MD      insulin glargine  25 Units Subcutaneous HS Irwin French MD      insulin lispro  1-6 Units Subcutaneous TID AC Irwin French MD      insulin lispro  3 Units Subcutaneous TID With Meals Kitty Salinas MD      lactated ringers  100 mL/hr Intravenous Continuous Kitty Salinas  mL/hr (06/04/24 1311)    trimethobenzamide  200 mg Intramuscular Q6H PRN Maggi Calloway MD          Today, Patient Was Seen By: Kitty Salinas MD    ** Please Note: Dictation voice to text software may have been used in the creation of this document. **

## 2024-06-05 ENCOUNTER — APPOINTMENT (INPATIENT)
Dept: MRI IMAGING | Facility: HOSPITAL | Age: 49
DRG: 638 | End: 2024-06-05
Payer: COMMERCIAL

## 2024-06-05 ENCOUNTER — APPOINTMENT (INPATIENT)
Dept: CT IMAGING | Facility: HOSPITAL | Age: 49
DRG: 638 | End: 2024-06-05
Payer: COMMERCIAL

## 2024-06-05 DIAGNOSIS — R94.31 ABNORMAL ECG: ICD-10-CM

## 2024-06-05 DIAGNOSIS — R79.89 ELEVATED TROPONIN: Primary | ICD-10-CM

## 2024-06-05 LAB
1,25(OH)2D3 SERPL-MCNC: 56 PG/ML (ref 24.8–81.5)
ANION GAP SERPL CALCULATED.3IONS-SCNC: 8 MMOL/L (ref 4–13)
ANION GAP SERPL CALCULATED.3IONS-SCNC: 9 MMOL/L (ref 4–13)
AORTIC ROOT: 2.7 CM
BASOPHILS # BLD AUTO: 0.02 THOUSANDS/ÂΜL (ref 0–0.1)
BASOPHILS NFR BLD AUTO: 0 % (ref 0–1)
BSA FOR ECHO PROCEDURE: 2.06 M2
BUN SERPL-MCNC: 15 MG/DL (ref 5–25)
BUN SERPL-MCNC: 15 MG/DL (ref 5–25)
CALCIUM SERPL-MCNC: 8.5 MG/DL (ref 8.4–10.2)
CALCIUM SERPL-MCNC: 8.6 MG/DL (ref 8.4–10.2)
CHLORIDE SERPL-SCNC: 102 MMOL/L (ref 96–108)
CHLORIDE SERPL-SCNC: 103 MMOL/L (ref 96–108)
CHOLEST SERPL-MCNC: 177 MG/DL
CO2 SERPL-SCNC: 26 MMOL/L (ref 21–32)
CO2 SERPL-SCNC: 26 MMOL/L (ref 21–32)
CREAT SERPL-MCNC: 0.74 MG/DL (ref 0.6–1.3)
CREAT SERPL-MCNC: 0.77 MG/DL (ref 0.6–1.3)
E WAVE DECELERATION TIME: 204 MS
E/A RATIO: 0.95
EOSINOPHIL # BLD AUTO: 0.2 THOUSAND/ÂΜL (ref 0–0.61)
EOSINOPHIL NFR BLD AUTO: 4 % (ref 0–6)
ERYTHROCYTE [DISTWIDTH] IN BLOOD BY AUTOMATED COUNT: 15.9 % (ref 11.6–15.1)
FRACTIONAL SHORTENING: 30 (ref 28–44)
GFR SERPL CREATININE-BSD FRML MDRD: 90 ML/MIN/1.73SQ M
GFR SERPL CREATININE-BSD FRML MDRD: 94 ML/MIN/1.73SQ M
GLUCOSE SERPL-MCNC: 134 MG/DL (ref 65–140)
GLUCOSE SERPL-MCNC: 151 MG/DL (ref 65–140)
GLUCOSE SERPL-MCNC: 160 MG/DL (ref 65–140)
GLUCOSE SERPL-MCNC: 160 MG/DL (ref 65–140)
GLUCOSE SERPL-MCNC: 161 MG/DL (ref 65–140)
GLUCOSE SERPL-MCNC: 222 MG/DL (ref 65–140)
HCT VFR BLD AUTO: 34.5 % (ref 34.8–46.1)
HDLC SERPL-MCNC: 28 MG/DL
HGB BLD-MCNC: 10.3 G/DL (ref 11.5–15.4)
IMM GRANULOCYTES # BLD AUTO: 0.01 THOUSAND/UL (ref 0–0.2)
IMM GRANULOCYTES NFR BLD AUTO: 0 % (ref 0–2)
INTERVENTRICULAR SEPTUM IN DIASTOLE (PARASTERNAL SHORT AXIS VIEW): 1 CM
INTERVENTRICULAR SEPTUM: 1 CM (ref 0.6–1.1)
LAAS-AP2: 19.1 CM2
LAAS-AP4: 18.7 CM2
LDLC SERPL CALC-MCNC: 112 MG/DL (ref 0–100)
LEFT ATRIUM SIZE: 3.8 CM
LEFT ATRIUM VOLUME (MOD BIPLANE): 52 ML
LEFT ATRIUM VOLUME INDEX (MOD BIPLANE): 25.2 ML/M2
LEFT INTERNAL DIMENSION IN SYSTOLE: 2.3 CM (ref 2.1–4)
LEFT VENTRICULAR INTERNAL DIMENSION IN DIASTOLE: 3.3 CM (ref 3.5–6)
LEFT VENTRICULAR POSTERIOR WALL IN END DIASTOLE: 0.9 CM
LEFT VENTRICULAR STROKE VOLUME: 28 ML
LVSV (TEICH): 28 ML
LYMPHOCYTES # BLD AUTO: 1.94 THOUSANDS/ÂΜL (ref 0.6–4.47)
LYMPHOCYTES NFR BLD AUTO: 38 % (ref 14–44)
MCH RBC QN AUTO: 19.6 PG (ref 26.8–34.3)
MCHC RBC AUTO-ENTMCNC: 29.9 G/DL (ref 31.4–37.4)
MCV RBC AUTO: 66 FL (ref 82–98)
MONOCYTES # BLD AUTO: 0.51 THOUSAND/ÂΜL (ref 0.17–1.22)
MONOCYTES NFR BLD AUTO: 10 % (ref 4–12)
MV E'TISSUE VEL-SEP: 9 CM/S
MV PEAK A VEL: 0.92 M/S
MV PEAK E VEL: 87 CM/S
NEUTROPHILS # BLD AUTO: 2.44 THOUSANDS/ÂΜL (ref 1.85–7.62)
NEUTS SEG NFR BLD AUTO: 48 % (ref 43–75)
NONHDLC SERPL-MCNC: 149 MG/DL
NRBC BLD AUTO-RTO: 0 /100 WBCS
PHOSPHATE SERPL-MCNC: 3.2 MG/DL (ref 2.7–4.5)
PLATELET # BLD AUTO: 271 THOUSANDS/UL (ref 149–390)
PMV BLD AUTO: 9.8 FL (ref 8.9–12.7)
POTASSIUM SERPL-SCNC: 3.8 MMOL/L (ref 3.5–5.3)
POTASSIUM SERPL-SCNC: 3.9 MMOL/L (ref 3.5–5.3)
RBC # BLD AUTO: 5.25 MILLION/UL (ref 3.81–5.12)
RIGHT ATRIAL 2D VOLUME: 19 ML
RIGHT ATRIUM AREA SYSTOLE A4C: 10.6 CM2
RIGHT VENTRICLE ID DIMENSION: 3.2 CM
SL CV LEFT ATRIUM LENGTH A2C: 5.5 CM
SL CV LV EF: 65
SL CV PED ECHO LEFT VENTRICLE DIASTOLIC VOLUME (MOD BIPLANE) 2D: 46 ML
SL CV PED ECHO LEFT VENTRICLE SYSTOLIC VOLUME (MOD BIPLANE) 2D: 18 ML
SODIUM SERPL-SCNC: 137 MMOL/L (ref 135–147)
SODIUM SERPL-SCNC: 137 MMOL/L (ref 135–147)
T4 FREE SERPL-MCNC: 0.93 NG/DL (ref 0.61–1.12)
TRICUSPID ANNULAR PLANE SYSTOLIC EXCURSION: 2.2 CM
TRICUSPID VALVE PEAK REGURGITATION VELOCITY: 2.17 M/S
TRIGL SERPL-MCNC: 183 MG/DL
WBC # BLD AUTO: 5.12 THOUSAND/UL (ref 4.31–10.16)

## 2024-06-05 PROCEDURE — 70498 CT ANGIOGRAPHY NECK: CPT

## 2024-06-05 PROCEDURE — 80048 BASIC METABOLIC PNL TOTAL CA: CPT | Performed by: INTERNAL MEDICINE

## 2024-06-05 PROCEDURE — 85025 COMPLETE CBC W/AUTO DIFF WBC: CPT | Performed by: INTERNAL MEDICINE

## 2024-06-05 PROCEDURE — A9585 GADOBUTROL INJECTION: HCPCS | Performed by: INTERNAL MEDICINE

## 2024-06-05 PROCEDURE — 82948 REAGENT STRIP/BLOOD GLUCOSE: CPT

## 2024-06-05 PROCEDURE — 70552 MRI BRAIN STEM W/DYE: CPT

## 2024-06-05 PROCEDURE — 70496 CT ANGIOGRAPHY HEAD: CPT

## 2024-06-05 PROCEDURE — 99233 SBSQ HOSP IP/OBS HIGH 50: CPT | Performed by: INTERNAL MEDICINE

## 2024-06-05 PROCEDURE — 84100 ASSAY OF PHOSPHORUS: CPT | Performed by: INTERNAL MEDICINE

## 2024-06-05 PROCEDURE — 80061 LIPID PANEL: CPT | Performed by: INTERNAL MEDICINE

## 2024-06-05 PROCEDURE — 99232 SBSQ HOSP IP/OBS MODERATE 35: CPT | Performed by: INTERNAL MEDICINE

## 2024-06-05 PROCEDURE — G0426 INPT/ED TELECONSULT50: HCPCS | Performed by: PSYCHIATRY & NEUROLOGY

## 2024-06-05 RX ORDER — SODIUM CHLORIDE, SODIUM LACTATE, POTASSIUM CHLORIDE, CALCIUM CHLORIDE 600; 310; 30; 20 MG/100ML; MG/100ML; MG/100ML; MG/100ML
75 INJECTION, SOLUTION INTRAVENOUS CONTINUOUS
Status: DISCONTINUED | OUTPATIENT
Start: 2024-06-05 | End: 2024-06-08 | Stop reason: HOSPADM

## 2024-06-05 RX ORDER — GADOBUTROL 604.72 MG/ML
9 INJECTION INTRAVENOUS
Status: COMPLETED | OUTPATIENT
Start: 2024-06-05 | End: 2024-06-05

## 2024-06-05 RX ORDER — ATORVASTATIN CALCIUM 10 MG/1
10 TABLET, FILM COATED ORAL
Status: DISCONTINUED | OUTPATIENT
Start: 2024-06-05 | End: 2024-06-06

## 2024-06-05 RX ADMIN — IOHEXOL 100 ML: 350 INJECTION, SOLUTION INTRAVENOUS at 22:12

## 2024-06-05 RX ADMIN — INSULIN LISPRO 3 UNITS: 100 INJECTION, SOLUTION INTRAVENOUS; SUBCUTANEOUS at 16:20

## 2024-06-05 RX ADMIN — INSULIN LISPRO 1 UNITS: 100 INJECTION, SOLUTION INTRAVENOUS; SUBCUTANEOUS at 07:36

## 2024-06-05 RX ADMIN — ENOXAPARIN SODIUM 40 MG: 40 INJECTION SUBCUTANEOUS at 08:17

## 2024-06-05 RX ADMIN — ASPIRIN 81 MG: 81 TABLET, COATED ORAL at 08:17

## 2024-06-05 RX ADMIN — GADOBUTROL 9 ML: 604.72 INJECTION INTRAVENOUS at 12:32

## 2024-06-05 RX ADMIN — INSULIN LISPRO 2 UNITS: 100 INJECTION, SOLUTION INTRAVENOUS; SUBCUTANEOUS at 11:24

## 2024-06-05 RX ADMIN — ATORVASTATIN CALCIUM 10 MG: 10 TABLET, FILM COATED ORAL at 16:21

## 2024-06-05 RX ADMIN — INSULIN LISPRO 3 UNITS: 100 INJECTION, SOLUTION INTRAVENOUS; SUBCUTANEOUS at 07:37

## 2024-06-05 RX ADMIN — CYANOCOBALAMIN TAB 500 MCG 500 MCG: 500 TAB at 08:17

## 2024-06-05 RX ADMIN — SODIUM CHLORIDE, SODIUM LACTATE, POTASSIUM CHLORIDE, AND CALCIUM CHLORIDE 75 ML/HR: .6; .31; .03; .02 INJECTION, SOLUTION INTRAVENOUS at 21:50

## 2024-06-05 RX ADMIN — INSULIN GLARGINE 25 UNITS: 100 INJECTION, SOLUTION SUBCUTANEOUS at 21:48

## 2024-06-05 RX ADMIN — INSULIN LISPRO 3 UNITS: 100 INJECTION, SOLUTION INTRAVENOUS; SUBCUTANEOUS at 11:24

## 2024-06-05 NOTE — OCCUPATIONAL THERAPY NOTE
Occupational Therapy Screen Note     Patient Name: Faye Samuels  Today's Date: 6/5/2024  Problem List  Principal Problem:    Generalized weakness  Active Problems:    Type 2 diabetes mellitus with hyperglycemia (HCC)    Anion gap metabolic acidosis    Elevated serum creatinine    Abnormal EKG    Essential hypertension    Morbid obesity (HCC)    abnormal MRI of brain            06/05/24 1505   OT Last Visit   OT Visit Date 06/05/24   Note Type   Note type Screen   Additional Comments OT orders received. Chart reviewed. Pt endorses all resolution of symptoms as well as being fully independent in room. No acute OT needs indicated at this time. Will D/C OT orders.         Corina Villa, OTR/L

## 2024-06-05 NOTE — CONSULTS
"TeleConsultation - Neurology   Faye Samuels 48 y.o. female MRN: [id number]  Unit/Bed#: -01 Encounter: [id number]      VIRTUAL CARE DOCUMENTATION:     1. This service was provided via Telemedicine using "[employer] Cart     2. Parties in the room with patient during teleconsult Patient only    3. Confidentiality My office door was closed     4. Participants No one else was in the room    5. Patient acknowledged consent and understanding of privacy and security of the  Telemedicine consult. I informed the patient that I have reviewed their record in Epic and presented the opportunity for them to ask any questions regarding the visit today.  The patient agreed to participate.    6. Time spent 61       Assessment & Plan     Possible hx of  TIA  -reported one episode of garbled speech on 4/16/24 for unclear duration, but spontaneously resolved. Patient was unclear whether she had other neuro deficit simultaneously  -patient was concerned as she reported significant FMH of stroke and cardiac condition  -Echo noticed apex hypokinetic, but otherwise unremarkable  -recommend to obtain CTA H/N to complete stroke workup  -Patient was started with ASA 81mg daily , she can continue for now. Pending CTA to make final recs. She will also need lipitor 80mg daily  -if CTA unremarkable, will recommend loop recorder or Jaquan patch      Concerning for IIH based on MRI finding  -patient's MRI brain wo demonstrated findings \"Partially empty sella and mildly prominent bilateral optic nerve sheaths CSF spaces suggesting intracranial hypertension\"  -Discussed with patient,  patient has obesity which could potentially put her on risk of developing IIH  -recommend team to perform LP for opening pressure, routine studies with cells/protein/ME panel/flow cytometry and freeze and hold    Abnormal MRI finding  -0.9 cm ovoid T2/FLAIR hyperintense focus along the dorsal margin of the left occipital condyle, likely representing a " "venous structure or varix.   -recommend to obtain MRI brain with contrast        Faye Samuels will need follow up in in 6 weeks with neurovascular attending or advance practitioner. She will not require outpatient neurological testing.    History of Present Illness     Reason for Consult / Principal Problem: abnormal MRI  Hx and PE limited by: telemedicine  HPI: Faye Samuels is a 48 y.o.  female with hx including morbid obesity, HTN, DM who presents with generalized weakness. Neuro was consulted after abnormal MRI findings.    Inpatient consult to Neurology  Consult performed by: Jd Martinez MD  Consult ordered by: Kitty Salinas MD         Review of Systems  10 system reviewed, unremarkable other than above    Historical Information   Past Medical History:   Diagnosis Date    Hypertension      History reviewed. No pertinent surgical history.  Social History   Social History     Substance and Sexual Activity   Alcohol Use Not Currently     Social History     Substance and Sexual Activity   Drug Use Not Currently     E-Cigarette/Vaping    E-Cigarette Use Never User      E-Cigarette/Vaping Substances     Social History     Tobacco Use   Smoking Status Never   Smokeless Tobacco Never     Family History: non-contributory    Review of previous medical records was  completed.     Meds/Allergies   all current active meds have been reviewed    No Known Allergies    Objective   Vitals:Blood pressure 130/91, pulse 83, temperature 98 °F (36.7 °C), temperature source Tympanic, resp. rate 18, height 5' 4\" (1.626 m), weight 102 kg (225 lb), last menstrual period 05/12/2024, SpO2 99%.,Body mass index is 38.62 kg/m².    Intake/Output Summary (Last 24 hours) at 6/5/2024 0849  Last data filed at 6/5/2024 0501  Gross per 24 hour   Intake 480 ml   Output 1800 ml   Net -1320 ml       Invasive Devices:   Invasive Devices       Peripheral Intravenous Line  Duration             Peripheral IV 06/03/24 Dorsal " (posterior);Right Hand 1 day    Peripheral IV 06/03/24 Left Antecubital 1 day                    Physical Exam  Neurologic Exam  GEN: in no acute distress, well-developed, well nourished  HEENT: normocephalic,  Nose and ears grossly normal in appearance.  CV:  no pedal edema.  Normotensive  PULM: airways patent, non-labored breathing   ABD:  Nondistended  EXT: no   edema or erythema.  No joint swelling  SKIN: no rashes or lesions.     NEURO:        Mental Status: Alert and oriented to person, place, and year. Interactive, able to follow commands.  Answers questions appropriately       Speech: Intact Articulation         CN 2-12: grossly intact       Motor: can move all extremities symmetrically      Sensory:  Reported intact light touch and pinprick throughout        Reflexes:  Not able to assess during tele visit       Coordination: no ataxia with finger-to-nose and heel-to-shin testing             Gait/Station: Deferred        Cortical: No Extinction   Lab Results: CBC:   Results from last 7 days   Lab Units 06/05/24  0543 06/04/24  0609 06/04/24  0042   WBC Thousand/uL 5.12 5.66 7.20   RBC Million/uL 5.25* 5.22* 4.97   HEMOGLOBIN g/dL 10.3* 10.4* 9.9*   HEMATOCRIT % 34.5* 34.4* 32.5*   MCV fL 66* 66* 65*   PLATELETS Thousands/uL 271 279 267   , BMP/CMP:   Results from last 7 days   Lab Units 06/05/24  0543 06/04/24  0609 06/03/24  2322 06/03/24  2043 06/03/24  1459   SODIUM mmol/L 137  137 136 136   < > 135   POTASSIUM mmol/L 3.9  3.8 3.7 3.6   < > 3.7   CHLORIDE mmol/L 103  102 102 103   < > 99   CO2 mmol/L 26  26 26 22   < > 19*   BUN mg/dL 15  15 24 30*   < > 29*   CREATININE mg/dL 0.77  0.74 0.91 1.12   < > 1.49*   CALCIUM mg/dL 8.5  8.6 8.0* 7.8*   < > 8.8   AST U/L  --   --   --   --  17   ALT U/L  --   --   --   --  20   ALK PHOS U/L  --   --   --   --  62   EGFR ml/min/1.73sq m 90  94 73 57   < > 40    < > = values in this interval not displayed.   , Lipid Profile:   Results from last 7 days   Lab  Units 06/05/24  0543   HDL mg/dL 28*   LDL CALC mg/dL 112*   TRIGLYCERIDES mg/dL 183*     Imaging Studies: I have personally reviewed pertinent films in PACS  EKG, Pathology, and Other Studies: I have personally reviewed pertinent reports.    VTE Prophylaxis: Heparin      Counseling / Coordination of Care  Total time spent today 61 minutes. Greater than 50% of total time was spent with the patient and / or family counseling and / or coordination of care. A description of the counseling / coordination of care: impression, further studies and follow up

## 2024-06-05 NOTE — PHYSICAL THERAPY NOTE
Physical Therapy Cancellation Note       06/05/24 1130   PT Last Visit   PT Visit Date 06/05/24   Note Type   Note type Cancelled Session   Cancel Reasons Other   Additional Comments Pt presents due to dizziness, weakness, fatigue.  Pt reports all symptoms have resolved and she feels like she is at her baseline functionality.  Pt reports she has ambulating around the room ad tayla independently and without AD.  Physican and nursing confirm pt's report.  Pt agreeable to discharge PT order without evaluation, and adviced to inquire with physican if her status or preferences change.

## 2024-06-05 NOTE — H&P (VIEW-ONLY)
"TeleConsultation - Neurology   Faye Samuels 48 y.o. female MRN: [id number]  Unit/Bed#: -01 Encounter: [id number]      VIRTUAL CARE DOCUMENTATION:     1. This service was provided via Telemedicine using [employer]     2. Parties in the room with patient during teleconsult Patient only    3. Confidentiality My office door was closed     4. Participants No one else was in the room    5. Patient acknowledged consent and understanding of privacy and security of the  Telemedicine consult. I informed the patient that I have reviewed their record in Epic and presented the opportunity for them to ask any questions regarding the visit today.  The patient agreed to participate.    6. Time spent 61       Assessment & Plan     Possible hx of  TIA  -reported one episode of garbled speech on 4/16/24 for unclear duration, but spontaneously resolved. Patient was unclear whether she had other neuro deficit simultaneously  -patient was concerned as she reported significant FMH of stroke and cardiac condition  -Echo noticed apex hypokinetic, but otherwise unremarkable  -recommend to obtain CTA H/N to complete stroke workup  -Patient was started with ASA 81mg daily , she can continue for now. Pending CTA to make final recs. She will also need lipitor 80mg daily  -if CTA unremarkable, will recommend loop recorder or Jaquan patch      Concerning for IIH based on MRI finding  -patient's MRI brain wo demonstrated findings \"Partially empty sella and mildly prominent bilateral optic nerve sheaths CSF spaces suggesting intracranial hypertension\"  -Discussed with patient,  patient has obesity which could potentially put her on risk of developing IIH  -recommend team to perform LP for opening pressure, routine studies with cells/protein/ME panel/flow cytometry and freeze and hold    Abnormal MRI finding  -0.9 cm ovoid T2/FLAIR hyperintense focus along the dorsal margin of the left occipital condyle, likely representing a " "venous structure or varix.   -recommend to obtain MRI brain with contrast        Faye Samuels will need follow up in in 6 weeks with neurovascular attending or advance practitioner. She will not require outpatient neurological testing.    History of Present Illness     Reason for Consult / Principal Problem: abnormal MRI  Hx and PE limited by: telemedicine  HPI: Faye Samuels is a 48 y.o.  female with hx including morbid obesity, HTN, DM who presents with generalized weakness. Neuro was consulted after abnormal MRI findings.    Inpatient consult to Neurology  Consult performed by: Jd Martinez MD  Consult ordered by: Kitty Salinas MD         Review of Systems  10 system reviewed, unremarkable other than above    Historical Information   Past Medical History:   Diagnosis Date    Hypertension      History reviewed. No pertinent surgical history.  Social History   Social History     Substance and Sexual Activity   Alcohol Use Not Currently     Social History     Substance and Sexual Activity   Drug Use Not Currently     E-Cigarette/Vaping    E-Cigarette Use Never User      E-Cigarette/Vaping Substances     Social History     Tobacco Use   Smoking Status Never   Smokeless Tobacco Never     Family History: non-contributory    Review of previous medical records was  completed.     Meds/Allergies   all current active meds have been reviewed    No Known Allergies    Objective   Vitals:Blood pressure 130/91, pulse 83, temperature 98 °F (36.7 °C), temperature source Tympanic, resp. rate 18, height 5' 4\" (1.626 m), weight 102 kg (225 lb), last menstrual period 05/12/2024, SpO2 99%.,Body mass index is 38.62 kg/m².    Intake/Output Summary (Last 24 hours) at 6/5/2024 0849  Last data filed at 6/5/2024 0501  Gross per 24 hour   Intake 480 ml   Output 1800 ml   Net -1320 ml       Invasive Devices:   Invasive Devices       Peripheral Intravenous Line  Duration             Peripheral IV 06/03/24 Dorsal " (posterior);Right Hand 1 day    Peripheral IV 06/03/24 Left Antecubital 1 day                    Physical Exam  Neurologic Exam  GEN: in no acute distress, well-developed, well nourished  HEENT: normocephalic,  Nose and ears grossly normal in appearance.  CV:  no pedal edema.  Normotensive  PULM: airways patent, non-labored breathing   ABD:  Nondistended  EXT: no   edema or erythema.  No joint swelling  SKIN: no rashes or lesions.     NEURO:        Mental Status: Alert and oriented to person, place, and year. Interactive, able to follow commands.  Answers questions appropriately       Speech: Intact Articulation         CN 2-12: grossly intact       Motor: can move all extremities symmetrically      Sensory:  Reported intact light touch and pinprick throughout        Reflexes:  Not able to assess during tele visit       Coordination: no ataxia with finger-to-nose and heel-to-shin testing             Gait/Station: Deferred        Cortical: No Extinction   Lab Results: CBC:   Results from last 7 days   Lab Units 06/05/24  0543 06/04/24  0609 06/04/24  0042   WBC Thousand/uL 5.12 5.66 7.20   RBC Million/uL 5.25* 5.22* 4.97   HEMOGLOBIN g/dL 10.3* 10.4* 9.9*   HEMATOCRIT % 34.5* 34.4* 32.5*   MCV fL 66* 66* 65*   PLATELETS Thousands/uL 271 279 267   , BMP/CMP:   Results from last 7 days   Lab Units 06/05/24  0543 06/04/24  0609 06/03/24  2322 06/03/24  2043 06/03/24  1459   SODIUM mmol/L 137  137 136 136   < > 135   POTASSIUM mmol/L 3.9  3.8 3.7 3.6   < > 3.7   CHLORIDE mmol/L 103  102 102 103   < > 99   CO2 mmol/L 26  26 26 22   < > 19*   BUN mg/dL 15  15 24 30*   < > 29*   CREATININE mg/dL 0.77  0.74 0.91 1.12   < > 1.49*   CALCIUM mg/dL 8.5  8.6 8.0* 7.8*   < > 8.8   AST U/L  --   --   --   --  17   ALT U/L  --   --   --   --  20   ALK PHOS U/L  --   --   --   --  62   EGFR ml/min/1.73sq m 90  94 73 57   < > 40    < > = values in this interval not displayed.   , Lipid Profile:   Results from last 7 days   Lab  Units 06/05/24  0543   HDL mg/dL 28*   LDL CALC mg/dL 112*   TRIGLYCERIDES mg/dL 183*     Imaging Studies: I have personally reviewed pertinent films in PACS  EKG, Pathology, and Other Studies: I have personally reviewed pertinent reports.    VTE Prophylaxis: Heparin      Counseling / Coordination of Care  Total time spent today 61 minutes. Greater than 50% of total time was spent with the patient and / or family counseling and / or coordination of care. A description of the counseling / coordination of care: impression, further studies and follow up

## 2024-06-05 NOTE — PROGRESS NOTES
Progress Note - Cardiology   Faye Samuels 48 y.o. female MRN: [id number]  Unit/Bed#: -01 Encounter: [id number]    Assessment/Plan:  #. Generalized weakness  #. N/V  #. Hyperglycemia  #. AGMA  #. LEONORA  #. Elevated troponin: Non MI troponin elevation   #. Diabetes mellitus, uncontrolled: HbA1c 13.8%     Echocardiogram demonstrates normal left ventricular size, wall thickness, and systolic function. There is apical hypokinesis. Right ventricular size and systolic function are normal. No significant valvular abnormalities.    Results reviewed with patient and her fiance. In the absence of any active cardiac complaints, stress testing can be performed in the outpatient setting and follow up in the office. Follow up will be arranged.    Cardiology will sign off at this time and be available as needed. Please call with questions.    Subjective/Objective   Chief Complaint: This is a 48 y.o. female with a past medical history of hypertension and diabetes mellitus. She presented with complaints of generalized weakness, nausea, and vomiting. Labs on presentation pertinent for AGMA, elevated beta hydroxybutyrate, normal pH on VBG, creatinine 1.49. Blood glucose level 213. ECG demonstrated sinus tachycardia, septal infarct pattern, inferior TWI. HS troponin 25, 273, 361, 407. Cardiology was consulted for elevated troponin. She denies any recent fever, chills, rhinorrhea, sore throat, nausea, vomiting, diarrhea. She denies any current or preceding chest pain, palpitations, shortness of breath with exertion, orthopnea, PND, leg edema. Fam hx pertinent for coronary artery disease. Father has first MI early 50s. Brother had coronary stents around 48 years of age. She is a lifetime non smoker. No history of illicit drug use.    Interval history: No chest pain, palpitations, shortness of breath. Echo results reviewed.    Objective:   Vitals: /91 (BP Location: Right arm)   Pulse 83   Temp 98 °F (36.7 °C) (Tympanic)    "Resp 18   Ht 5' 4\" (1.626 m)   Wt 102 kg (225 lb)   LMP 05/12/2024   SpO2 99%   BMI 38.62 kg/m²   Vitals:    06/03/24 1926 06/04/24 1455   Weight: 102 kg (225 lb 4 oz) 102 kg (225 lb)     Orthostatic Blood Pressures      Flowsheet Row Most Recent Value   Blood Pressure 130/91 filed at 06/05/2024 0639   Patient Position - Orthostatic VS Lying filed at 06/05/2024 0639              Intake/Output Summary (Last 24 hours) at 6/5/2024 0837  Last data filed at 6/5/2024 0501  Gross per 24 hour   Intake 480 ml   Output 1800 ml   Net -1320 ml       Invasive Devices       Peripheral Intravenous Line  Duration             Peripheral IV 06/03/24 Dorsal (posterior);Right Hand 1 day    Peripheral IV 06/03/24 Left Antecubital 1 day                    Review of Systems: All systems reviewed and negative except for that noted above    Physical Exam: General appearance: alert and oriented, in no acute distress  Eyes: Sclerae anicteric  Neck: no JVD and supple, symmetrical, trachea midline  Lungs: clear to auscultation bilaterally  Abdomen: soft, non-tender; bowel sounds normal; no masses,  no organomegaly  Extremities: extremities normal, warm and well-perfused; no cyanosis, clubbing, or edema    Lab Results: I have personally reviewed pertinent lab results.    Imaging: I have personally reviewed pertinent reports.    EKG: Personally reviewed    Counseling / Coordination of Care  Total time spent today 25 minutes. Greater than 50% of total time was spent with the patient and / or family counseling and / or coordination of care.   "

## 2024-06-05 NOTE — PROGRESS NOTES
abnormal MRI of brain  Assessment & Plan  Stated that she had episode of lightheadedness and blurred vision in April 2024 while at work when she was noted to have elevated blood pressure and however she did not come to the emergency room to get evaluated  Patient states that she had stopped taking her antihypertensive but recently she restarted them  Currently denies of lightheadedness or blurred vision, but she thinks that she may have had a mini stroke in April with strong family history of strokes, would resume aspirin daily,   MRI of the brain was reviewed shows possibility of intracranial hypertension, neurology was consulted and recommended lumbar puncture which will be scheduled on Friday and also MRI of the brain with contrast  Plan was discussed with interventional radiology, lumbar puncture to be done on Friday, will hydrate the patient prior to the procedure and also hold Lovenox a.m. dose,    Morbid obesity (HCC)  Assessment & Plan  BMI of 38.22  Lifestyle/diet modifications    Essential hypertension  Assessment & Plan  Hold HCTZ/ACEI in the setting of elevated creatinine and borderline hypotensive episodes   Resume when able     Abnormal EKG  Assessment & Plan  Presenting EKG with evidence of Q waves and nonspecific T wave inversions in III and aVF  ED provider discussed with on-call cardiologist who feel nonstick changes are probably secondary to metabolic derangements as above -> continue IV fluid hydration and optimize blood sugar control  Telemetry and electrolytes monitoring - repeat EKG in the morning  Await official cardiology consultation  Baseline high-sensitivity troponin normal -> trend serial sets  6/5-cardiology initiated, EKG showed moderate T wave inversion in leads, no evidence for acute coronary syndrome,  MRI troponin elevation in the setting of hyperglycemia and volume depletion, tachycardia has resolved, 2D echocardiogram is unremarkable, no further inpatient cardiac workup  necessary,  Lipid panel abnormal-started on low-dose of statin,    Elevated serum creatinine  Assessment & Plan  Presenting creatinine of 1.49 (baseline unknown)  Continue IV fluid hydration and monitor renal function and urine output  Limit/when nephrotoxins and hypotension as possible  Renal ultrasound-is normal    Anion gap metabolic acidosis  Assessment & Plan  Anion gap acidosis noted on admission -> patient beta-hydroxybutyrate with a normal pH on blood gas  Suspect secondary to intravascular volume depletion and uncontrolled diabetes mellitus (see above)  IV fluid infusion with a bicarbonate drip initiated  Monitor serum bicarbonate/potassium and renal function  Resolved     Type 2 diabetes mellitus with hyperglycemia (HCC)  Assessment & Plan  Check updated A1c  Presenting blood glucose of 321 -> 252 status post IV fluid hydration and a one-time regular insulin SC injection  In light of mild beta hydroxybutyrate elevation and anion gap metabolic acidosis, initiated on an insulin drip along with IV fluids (bicarbonate infusion)  Monitor serial Accu-Cheks with hopeful plan to wean off insulin drip to basal/prandial insulin in the next 12-24 hours  Monitor/replete serum potassium/phosphate deficiencies if present  Will require ambulatory referral to endocrinology on discharge  6/5-Blood sugar fairly controlled continue insulin Lantus 25 units daily  Humalog 3 units with meals  [id number] of 13.8    * Generalized weakness  Assessment & Plan  Likely multifactorial secondary to uncontrolled diabetes mellitus and sequela as below  Check TSH and B12/vitamin D levels  PT/OT input to be appreciated        VTE Pharmacologic Prophylaxis:   Pharmacologic: Enoxaparin (Lovenox)      Mechanical VTE Prophylaxis in Place: Yes    Patient Centered Rounds: I have performed bedside rounds with nursing staff today.    Discussions with Specialists or Other Care Team Provider: d/w neurology    Education and Discussions with Family /  Patient: d/w     Time Spent for Care: 45 minutes.  More than 50% of total time spent on counseling and coordination of care as described above.    Current Length of Stay: 2 day(s)    Current Patient Status: Inpatient   Certification Statement: The patient will continue to require additional inpatient hospital stay due to abnormal MRI     Discharge Plan: home in 48hrs     Code Status: Level 1 - Full Code      Subjective:   Pt is feeling better , pt has no nausea or vomiting ,     Objective:     Vitals:   Temp (24hrs), Av °F (36.7 °C), Min:97.5 °F (36.4 °C), Max:98.9 °F (37.2 °C)    Temp:  [97.5 °F (36.4 °C)-98.9 °F (37.2 °C)] 98 °F (36.7 °C)  HR:  [78-95] 83  Resp:  [18-20] 18  BP: (110-142)/(65-97) 130/91  SpO2:  [97 %-99 %] 99 %  Body mass index is 38.62 kg/m².     Input and Output Summary (last 24 hours):       Intake/Output Summary (Last 24 hours) at 2024 1037  Last data filed at 2024 0501  Gross per 24 hour   Intake 480 ml   Output 1800 ml   Net -1320 ml       Physical Exam:     HEENT-PERRLA, moist oral mucosa  Neck-supple, no JVD elevation   Respiratory-equal air entry bilaterally, no rales or rhonchi  Cardiovascular system-S1, S2 heard, no murmur or gallops or rubs  Abdomen-soft, nontender, no guarding or rigidity, bowel sounds heard  Extremities-no pedal edema  Peripheral pulses palpable  Musculoskeletal-no contractures  Central nervous system-no acute focal neurological deficit ,no sensory or motor deficit noted.  Skin-no rash noted    Additional Data:     Labs:    Results from last 7 days   Lab Units 24  0543   WBC Thousand/uL 5.12   HEMOGLOBIN g/dL 10.3*   HEMATOCRIT % 34.5*   PLATELETS Thousands/uL 271   SEGS PCT % 48   LYMPHO PCT % 38   MONO PCT % 10   EOS PCT % 4     Results from last 7 days   Lab Units 24  0543 24  2043 24  1459   SODIUM mmol/L 137  137   < > 135   POTASSIUM mmol/L 3.9  3.8   < > 3.7   CHLORIDE mmol/L 103  102   < > 99   CO2 mmol/L 26  26    < > 19*   BUN mg/dL 15  15   < > 29*   CREATININE mg/dL 0.77  0.74   < > 1.49*   ANION GAP mmol/L 8  9   < > 17*   CALCIUM mg/dL 8.5  8.6   < > 8.8   ALBUMIN g/dL  --   --  3.9   TOTAL BILIRUBIN mg/dL  --   --  0.39   ALK PHOS U/L  --   --  62   ALT U/L  --   --  20   AST U/L  --   --  17   GLUCOSE RANDOM mg/dL 161*  160*   < > 321*    < > = values in this interval not displayed.         Results from last 7 days   Lab Units 06/05/24  0735 06/04/24 2001 06/04/24  1556 06/04/24  1104 06/04/24  0713 06/04/24  0208 06/03/24  2326 06/03/24  2229 06/03/24  2128 06/03/24  2040 06/03/24 2005 06/03/24  1903   POC GLUCOSE mg/dl 160* 205* 164* 255* 128 146* 187* 202* 137 107 125 195*     Results from last 7 days   Lab Units 06/03/24  1459   HEMOGLOBIN A1C % 13.8*               * I Have Reviewed All Lab Data Listed Above.  * Additional Pertinent Lab Tests Reviewed: All Labs For Current Hospital Admission Reviewed    Imaging:    Imaging Reports Reviewed Today Include: I have personally reviewed pertinent films in PACS.    Imaging Personally Reviewed by Myself Includes:  Same as above    Recent Cultures (last 7 days):           Last 24 Hours Medication List:   Current Facility-Administered Medications   Medication Dose Route Frequency Provider Last Rate    acetaminophen  650 mg Oral Q6H PRN Maggi Calloway MD      aspirin  81 mg Oral Daily Kitty Salinas MD      atorvastatin  10 mg Oral Daily With Dinner Kitty Salinas MD      cyanocobalamin  500 mcg Oral Daily Kitty Salinas MD      enoxaparin  40 mg Subcutaneous Q24H [facility] Kitty Salinas MD      hydrALAZINE  5 mg Intravenous Q6H PRN Maggi Calloway MD      insulin glargine  25 Units Subcutaneous HS Irwin French MD      insulin lispro  1-6 Units Subcutaneous TID AC Irwin French MD      insulin lispro  3 Units Subcutaneous TID With Meals Kitty Salinas MD      lactated ringers  75 mL/hr Intravenous  Continuous Kitty Salinas MD      trimethobenzamide  200 mg Intramuscular Q6H PRN Maggi Calloway MD          Today, Patient Was Seen By: Kitty Salinas MD    ** Please Note: Dictation voice to text software may have been used in the creation of this document. **

## 2024-06-05 NOTE — Clinical Note
Patient is still admitted. Please arrange nuclear stress test and follow up in the office with [facility] after testing.

## 2024-06-06 PROBLEM — K02.9 TOOTH CARIES: Status: ACTIVE | Noted: 2024-06-06

## 2024-06-06 LAB
ANION GAP SERPL CALCULATED.3IONS-SCNC: 8 MMOL/L (ref 4–13)
BASOPHILS # BLD AUTO: 0.02 THOUSANDS/ÂΜL (ref 0–0.1)
BASOPHILS NFR BLD AUTO: 0 % (ref 0–1)
BUN SERPL-MCNC: 12 MG/DL (ref 5–25)
CALCIUM SERPL-MCNC: 9 MG/DL (ref 8.4–10.2)
CHLORIDE SERPL-SCNC: 105 MMOL/L (ref 96–108)
CO2 SERPL-SCNC: 26 MMOL/L (ref 21–32)
CREAT SERPL-MCNC: 0.7 MG/DL (ref 0.6–1.3)
EOSINOPHIL # BLD AUTO: 0.19 THOUSAND/ÂΜL (ref 0–0.61)
EOSINOPHIL NFR BLD AUTO: 3 % (ref 0–6)
ERYTHROCYTE [DISTWIDTH] IN BLOOD BY AUTOMATED COUNT: 15.9 % (ref 11.6–15.1)
GFR SERPL CREATININE-BSD FRML MDRD: 101 ML/MIN/1.73SQ M
GLUCOSE SERPL-MCNC: 145 MG/DL (ref 65–140)
GLUCOSE SERPL-MCNC: 146 MG/DL (ref 65–140)
GLUCOSE SERPL-MCNC: 177 MG/DL (ref 65–140)
GLUCOSE SERPL-MCNC: 183 MG/DL (ref 65–140)
GLUCOSE SERPL-MCNC: 185 MG/DL (ref 65–140)
HCT VFR BLD AUTO: 32.8 % (ref 34.8–46.1)
HGB BLD-MCNC: 10.1 G/DL (ref 11.5–15.4)
IMM GRANULOCYTES # BLD AUTO: 0.03 THOUSAND/UL (ref 0–0.2)
IMM GRANULOCYTES NFR BLD AUTO: 1 % (ref 0–2)
INR PPP: 1.15 (ref 0.84–1.19)
LYMPHOCYTES # BLD AUTO: 2.52 THOUSANDS/ÂΜL (ref 0.6–4.47)
LYMPHOCYTES NFR BLD AUTO: 40 % (ref 14–44)
MCH RBC QN AUTO: 20.2 PG (ref 26.8–34.3)
MCHC RBC AUTO-ENTMCNC: 30.8 G/DL (ref 31.4–37.4)
MCV RBC AUTO: 66 FL (ref 82–98)
MONOCYTES # BLD AUTO: 0.53 THOUSAND/ÂΜL (ref 0.17–1.22)
MONOCYTES NFR BLD AUTO: 8 % (ref 4–12)
NEUTROPHILS # BLD AUTO: 2.99 THOUSANDS/ÂΜL (ref 1.85–7.62)
NEUTS SEG NFR BLD AUTO: 48 % (ref 43–75)
NRBC BLD AUTO-RTO: 0 /100 WBCS
PHOSPHATE SERPL-MCNC: 4.5 MG/DL (ref 2.7–4.5)
PLATELET # BLD AUTO: 279 THOUSANDS/UL (ref 149–390)
PMV BLD AUTO: 9.8 FL (ref 8.9–12.7)
POTASSIUM SERPL-SCNC: 3.8 MMOL/L (ref 3.5–5.3)
PROTHROMBIN TIME: 14.6 SECONDS (ref 11.6–14.5)
RBC # BLD AUTO: 5.01 MILLION/UL (ref 3.81–5.12)
SODIUM SERPL-SCNC: 139 MMOL/L (ref 135–147)
WBC # BLD AUTO: 6.28 THOUSAND/UL (ref 4.31–10.16)

## 2024-06-06 PROCEDURE — 82948 REAGENT STRIP/BLOOD GLUCOSE: CPT

## 2024-06-06 PROCEDURE — 99232 SBSQ HOSP IP/OBS MODERATE 35: CPT | Performed by: INTERNAL MEDICINE

## 2024-06-06 PROCEDURE — 85025 COMPLETE CBC W/AUTO DIFF WBC: CPT | Performed by: INTERNAL MEDICINE

## 2024-06-06 PROCEDURE — 85610 PROTHROMBIN TIME: CPT | Performed by: INTERNAL MEDICINE

## 2024-06-06 PROCEDURE — 84100 ASSAY OF PHOSPHORUS: CPT | Performed by: INTERNAL MEDICINE

## 2024-06-06 PROCEDURE — 80048 BASIC METABOLIC PNL TOTAL CA: CPT | Performed by: INTERNAL MEDICINE

## 2024-06-06 RX ORDER — ATORVASTATIN CALCIUM 40 MG/1
80 TABLET, FILM COATED ORAL
Status: DISCONTINUED | OUTPATIENT
Start: 2024-06-06 | End: 2024-06-08 | Stop reason: HOSPADM

## 2024-06-06 RX ADMIN — INSULIN LISPRO 1 UNITS: 100 INJECTION, SOLUTION INTRAVENOUS; SUBCUTANEOUS at 11:19

## 2024-06-06 RX ADMIN — INSULIN LISPRO 3 UNITS: 100 INJECTION, SOLUTION INTRAVENOUS; SUBCUTANEOUS at 16:11

## 2024-06-06 RX ADMIN — INSULIN LISPRO 3 UNITS: 100 INJECTION, SOLUTION INTRAVENOUS; SUBCUTANEOUS at 11:19

## 2024-06-06 RX ADMIN — CYANOCOBALAMIN TAB 500 MCG 500 MCG: 500 TAB at 08:01

## 2024-06-06 RX ADMIN — ATORVASTATIN CALCIUM 80 MG: 40 TABLET, FILM COATED ORAL at 15:56

## 2024-06-06 RX ADMIN — INSULIN GLARGINE 25 UNITS: 100 INJECTION, SOLUTION SUBCUTANEOUS at 22:27

## 2024-06-06 RX ADMIN — INSULIN LISPRO 3 UNITS: 100 INJECTION, SOLUTION INTRAVENOUS; SUBCUTANEOUS at 07:58

## 2024-06-06 RX ADMIN — ASPIRIN 81 MG: 81 TABLET, COATED ORAL at 08:02

## 2024-06-06 RX ADMIN — INSULIN LISPRO 1 UNITS: 100 INJECTION, SOLUTION INTRAVENOUS; SUBCUTANEOUS at 16:11

## 2024-06-06 RX ADMIN — SODIUM CHLORIDE, SODIUM LACTATE, POTASSIUM CHLORIDE, AND CALCIUM CHLORIDE 75 ML/HR: .6; .31; .03; .02 INJECTION, SOLUTION INTRAVENOUS at 12:39

## 2024-06-06 NOTE — PROGRESS NOTES
Tooth caries  Assessment & Plan  Carious right second mandibular premolar tooth with periapical lucency disrupting the buccal cortex noted on CTA of the head and neck   Needs dental eval as outpt    abnormal MRI of brain  Assessment & Plan  Stated that she had episode of lightheadedness and blurred vision in April 2024 while at work when she was noted to have elevated blood pressure and however she did not come to the emergency room to get evaluated  Patient states that she had stopped taking her antihypertensive but recently she restarted them  Currently denies of lightheadedness or blurred vision, but she thinks that she may have had a mini stroke in April with strong family history of strokes, would resume aspirin daily,   MRI of the brain was reviewed shows possibility of intracranial hypertension, neurology was consulted and recommended lumbar puncture which will be scheduled on Friday and also MRI of the brain with contrast which shows prominent draining vein/venous varices dorsal to the left occipital condyle corresponding to the hyperintense T2/FLAIR focus noted on the noncontrast MRI exam  No acute pathological intra-axial enhancement noted.  Plan was discussed with interventional radiology, lumbar puncture to be done on Friday, will hydrate the patient prior to the procedure and also hold Lovenox a.m. dose,  Continue aspirin and high-dose statin as per neurology recommendation  CTA head and neck was obtained shows no acute changes however shows dental caries which needs outpatient follow-up with dentist  Also recommend loop recorder or Zio patch at the time of discharge.    Morbid obesity (HCC)  Assessment & Plan  BMI of 38.22  Lifestyle/diet modifications    Essential hypertension  Assessment & Plan  Hold HCTZ/ACEI in the setting of elevated creatinine and borderline hypotensive episodes   Resume when able     Abnormal EKG  Assessment & Plan  Presenting EKG with evidence of Q waves and nonspecific T wave  inversions in III and aVF  ED provider discussed with on-call cardiologist who feel nonstick changes are probably secondary to metabolic derangements as above -> continue IV fluid hydration and optimize blood sugar control  Telemetry and electrolytes monitoring - repeat EKG in the morning  Await official cardiology consultation  Baseline high-sensitivity troponin normal -> trend serial sets  6/5-cardiology initiated, EKG showed moderate T wave inversion in leads, no evidence for acute coronary syndrome,  MRI troponin elevation in the setting of hyperglycemia and volume depletion, tachycardia has resolved, 2D echocardiogram is unremarkable, no further inpatient cardiac workup necessary,  Lipid panel abnormal-started on low-dose of statin,    Elevated serum creatinine  Assessment & Plan  Presenting creatinine of 1.49 (baseline unknown)  Continue IV fluid hydration and monitor renal function and urine output  Limit/when nephrotoxins and hypotension as possible  Renal ultrasound-is normal    Anion gap metabolic acidosis  Assessment & Plan  Anion gap acidosis noted on admission -> patient beta-hydroxybutyrate with a normal pH on blood gas  Suspect secondary to intravascular volume depletion and uncontrolled diabetes mellitus (see above)  IV fluid infusion with a bicarbonate drip initiated  Monitor serum bicarbonate/potassium and renal function  Resolved     Type 2 diabetes mellitus with hyperglycemia (HCC)  Assessment & Plan  Check updated A1c  Presenting blood glucose of 321 -> 252 status post IV fluid hydration and a one-time regular insulin SC injection  In light of mild beta hydroxybutyrate elevation and anion gap metabolic acidosis, initiated on an insulin drip along with IV fluids (bicarbonate infusion)  Monitor serial Accu-Cheks with hopeful plan to wean off insulin drip to basal/prandial insulin in the next 12-24 hours  Monitor/replete serum potassium/phosphate deficiencies if present  Will require ambulatory  referral to endocrinology on discharge  -blood sugar is fairly controlled after adding humaLog with meals continue insulin Lantus 25 units daily  Humalog 3 units with meals  [id number] of 13.8    * Generalized weakness  Assessment & Plan  Likely multifactorial secondary to uncontrolled diabetes mellitus and sequela as below  Check TSH and B12/vitamin D levels  PT/OT input to be appreciated        VTE Pharmacologic Prophylaxis:   Pharmacologic: Enoxaparin (Lovenox)      Mechanical VTE Prophylaxis in Place: Yes    Patient Centered Rounds: I have performed bedside rounds with nursing staff today.    Discussions with Specialists or Other Care Team Provider: Discussed with neurology    Education and Discussions with Family / Patient: Discussed with     Time Spent for Care: 45 minutes.  More than 50% of total time spent on counseling and coordination of care as described above.    Current Length of Stay: 3 day(s)    Current Patient Status: Inpatient   Certification Statement: The patient will continue to require additional inpatient hospital stay due to abnormal MRI brain needing LP    Discharge Plan: Home in 24-r 48 hours    Code Status: Level 1 - Full Code      Subjective:   Patient feels better.  Denies of any chest pain or shortness of breath denies of headache or nausea or vomiting.    Objective:     Vitals:   Temp (24hrs), Av.2 °F (36.8 °C), Min:98 °F (36.7 °C), Max:98.4 °F (36.9 °C)    Temp:  [98 °F (36.7 °C)-98.4 °F (36.9 °C)] 98 °F (36.7 °C)  HR:  [74-89] 78  Resp:  [19-20] 20  BP: (120-143)/(76-89) 130/76  SpO2:  [96 %-98 %] 97 %  Body mass index is 38.62 kg/m².     Input and Output Summary (last 24 hours):       Intake/Output Summary (Last 24 hours) at 2024 0826  Last data filed at 2024 2228  Gross per 24 hour   Intake --   Output 600 ml   Net -600 ml       Physical Exam:     HEENT-PERRLA, moist oral mucosa  Neck-supple, no JVD elevation   Respiratory-equal air entry bilaterally, no rales or  rhonchi  Cardiovascular system-S1, S2 heard, no murmur or gallops or rubs  Abdomen-soft, nontender, no guarding or rigidity, bowel sounds heard  Extremities-no pedal edema  Peripheral pulses palpable  Musculoskeletal-no contractures  Central nervous system-no acute focal neurological deficit ,no sensory or motor deficit noted.  Skin-no rash noted    Additional Data:     Labs:    Results from last 7 days   Lab Units 06/06/24  0523   WBC Thousand/uL 6.28   HEMOGLOBIN g/dL 10.1*   HEMATOCRIT % 32.8*   PLATELETS Thousands/uL 279   SEGS PCT % 48   LYMPHO PCT % 40   MONO PCT % 8   EOS PCT % 3     Results from last 7 days   Lab Units 06/06/24  0523 06/03/24  2043 06/03/24  1459   SODIUM mmol/L 139   < > 135   POTASSIUM mmol/L 3.8   < > 3.7   CHLORIDE mmol/L 105   < > 99   CO2 mmol/L 26   < > 19*   BUN mg/dL 12   < > 29*   CREATININE mg/dL 0.70   < > 1.49*   ANION GAP mmol/L 8   < > 17*   CALCIUM mg/dL 9.0   < > 8.8   ALBUMIN g/dL  --   --  3.9   TOTAL BILIRUBIN mg/dL  --   --  0.39   ALK PHOS U/L  --   --  62   ALT U/L  --   --  20   AST U/L  --   --  17   GLUCOSE RANDOM mg/dL 145*   < > 321*    < > = values in this interval not displayed.     Results from last 7 days   Lab Units 06/06/24  0523   INR  1.15     Results from last 7 days   Lab Units 06/06/24  0705 06/05/24 2011 06/05/24  1544 06/05/24  1117 06/05/24  0735 06/04/24 2001 06/04/24  1556 06/04/24  1104 06/04/24  0713 06/04/24  0208 06/03/24  2326 06/03/24  2229   POC GLUCOSE mg/dl 146* 151* 134 222* 160* 205* 164* 255* 128 146* 187* 202*     Results from last 7 days   Lab Units 06/03/24  1459   HEMOGLOBIN A1C % 13.8*               * I Have Reviewed All Lab Data Listed Above.  * Additional Pertinent Lab Tests Reviewed: All Labs For Current Hospital Admission Reviewed    Imaging:    Imaging Reports Reviewed Today Include: I have personally reviewed pertinent films in PACS.    Imaging Personally Reviewed by Myself Includes:  Same as above    Recent Cultures (last  7 days):           Last 24 Hours Medication List:   Current Facility-Administered Medications   Medication Dose Route Frequency Provider Last Rate    acetaminophen  650 mg Oral Q6H PRN Maggi Calloway MD      aspirin  81 mg Oral Daily Kitty Salinas MD      atorvastatin  80 mg Oral Daily With Dinner Kitty Salinas MD      cyanocobalamin  500 mcg Oral Daily Kitty Salinas MD      enoxaparin  40 mg Subcutaneous Q24H EMILE Kitty Salinas MD      hydrALAZINE  5 mg Intravenous Q6H PRN Maggi Calloway MD      insulin glargine  25 Units Subcutaneous HS Irwin French MD      insulin lispro  1-6 Units Subcutaneous TID AC Irwin French MD      insulin lispro  3 Units Subcutaneous TID With Meals Kitty Salinas MD      lactated ringers  75 mL/hr Intravenous Continuous Kitty Salinas MD 75 mL/hr (06/05/24 2150)    trimethobenzamide  200 mg Intramuscular Q6H PRN Maggi Calloway MD          Today, Patient Was Seen By: Kitty Salinas MD    ** Please Note: Dictation voice to text software may have been used in the creation of this document. **

## 2024-06-06 NOTE — CASE MANAGEMENT
Case Management Assessment & Discharge Planning Note    Patient name Faye Samuels  Location /-01 MRN [id number]  : 1975 Date 2024       Current Admission Date: 6/3/2024  Current Admission Diagnosis:Generalized weakness   Patient Active Problem List    Diagnosis Date Noted Date Diagnosed    Tooth caries 2024     abnormal MRI of brain 2024     Generalized weakness 2024     Type 2 diabetes mellitus with hyperglycemia (HCC) 2024     Anion gap metabolic acidosis 2024     Elevated serum creatinine 2024     Abnormal EKG 2024     Essential hypertension 2024     Morbid obesity (HCC) 2024       LOS (days): 3  Geometric Mean LOS (GMLOS) (days): 3  Days to GMLOS:0.1     OBJECTIVE:    Risk of Unplanned Readmission Score: 9.83      Current admission status: Inpatient  Referral Reason: Other    Preferred Pharmacy:   [facility]/pharmacy #[id number] - [address] [address]  Phone: [phone] Fax: [phone]    Primary Care Provider: Poonam Dunlap MD    Primary Insurance: BLUE CROSS  Secondary Insurance:     ASSESSMENT:  Active Health Care Proxies    There are no active Health Care Proxies on file.       Readmission Root Cause  30 Day Readmission: No    Patient Information  Admitted from:: Home  Mental Status: Alert  During Assessment patient was accompanied by: Not accompanied during assessment  Assessment information provided by:: Patient  Primary Caregiver: Self  Support Systems: Daughter, Spouse/significant other, Family members, Friend  County of Residence: [place]  What city do you live in?: [place]  Home entry access options. Select all that apply.: Stairs  Number of steps to enter home.: One Flight  Do the steps have railings?: Yes  Type of Current Residence: Apartment  Floor Level: 2  Upon entering residence, is there a bedroom on the main floor (no further steps)?: Yes  Upon entering  residence, is there a bathroom on the main floor (no further steps)?: Yes  Living Arrangements: Lives w/ Spouse/significant other, Lives w/ Daughter, Lives w/ Extended Family  Is patient a ?: No    Activities of Daily Living Prior to Admission  Functional Status: Independent  Completes ADLs independently?: Yes  Ambulates independently?: Yes  Does patient use assisted devices?: No  Does patient currently own DME?: No  Does patient have a history of Outpatient Therapy (PT/OT)?: No  Does the patient have a history of Short-Term Rehab?: No  Does patient have a history of HHC?: No  Does patient currently have HHC?: No    Patient Information Continued  Income Source: Employed  Does patient have prescription coverage?: Yes  Does patient receive dialysis treatments?: No  Does patient have a history of substance abuse?: No  Does patient have a history of Mental Health Diagnosis?: No    Means of Transportation  Means of Transport to Appts:: Family transport    DISCHARGE DETAILS:    Discharge planning discussed with:: Patient  Freedom of Choice: Yes  Comments - Freedom of Choice: CM met with patient to introduce role and begin discharge planning. Patient is choosing to return home and denies and needs. CM will follow.  CM contacted family/caregiver?: No- see comments (Patient will update family herself)  Were Treatment Team discharge recommendations reviewed with patient/caregiver?: Yes  Did patient/caregiver verbalize understanding of patient care needs?: Yes  Were patient/caregiver advised of the risks associated with not following Treatment Team discharge recommendations?: Yes    Contacts  Patient Contacts: Titus French  Relationship to Patient:: Other (Comment) (Gera)    Requested Home Health Care         Is the patient interested in HHC at discharge?: No    DME Referral Provided  Referral made for DME?: No    Other Referral/Resources/Interventions Provided:  Interventions: None Indicated    Would you like to  participate in our [facility] service program?  : No - Declined    Treatment Team Recommendation: Home  Discharge Destination Plan:: Home

## 2024-06-06 NOTE — ASSESSMENT & PLAN NOTE
Carious right second mandibular premolar tooth with periapical lucency disrupting the buccal cortex noted on CTA of the head and neck   Needs dental eval as outpt

## 2024-06-07 ENCOUNTER — APPOINTMENT (INPATIENT)
Dept: INTERVENTIONAL RADIOLOGY/VASCULAR | Facility: HOSPITAL | Age: 49
DRG: 638 | End: 2024-06-07
Payer: COMMERCIAL

## 2024-06-07 LAB
ANION GAP SERPL CALCULATED.3IONS-SCNC: 9 MMOL/L (ref 4–13)
APPEARANCE CSF: CLEAR
BASOPHILS # BLD AUTO: 0.03 THOUSANDS/ÂΜL (ref 0–0.1)
BASOPHILS NFR BLD AUTO: 0 % (ref 0–1)
BUN SERPL-MCNC: 16 MG/DL (ref 5–25)
C GATTII+NEOFOR DNA CSF QL NAA+NON-PROBE: NOT DETECTED
CALCIUM SERPL-MCNC: 8.9 MG/DL (ref 8.4–10.2)
CHLORIDE SERPL-SCNC: 106 MMOL/L (ref 96–108)
CMV DNA CSF QL NAA+NON-PROBE: NOT DETECTED
CO2 SERPL-SCNC: 23 MMOL/L (ref 21–32)
CREAT SERPL-MCNC: 0.64 MG/DL (ref 0.6–1.3)
CRYPTOC AG CSF QL IA: NEGATIVE
E COLI K1 DNA CSF QL NAA+NON-PROBE: NOT DETECTED
EOSINOPHIL # BLD AUTO: 0.2 THOUSAND/ÂΜL (ref 0–0.61)
EOSINOPHIL NFR BLD AUTO: 3 % (ref 0–6)
ERYTHROCYTE [DISTWIDTH] IN BLOOD BY AUTOMATED COUNT: 16 % (ref 11.6–15.1)
EV RNA CSF QL NAA+NON-PROBE: NOT DETECTED
GFR SERPL CREATININE-BSD FRML MDRD: 104 ML/MIN/1.73SQ M
GLUCOSE CSF-MCNC: 96 MG/DL (ref 40–70)
GLUCOSE SERPL-MCNC: 148 MG/DL (ref 65–140)
GLUCOSE SERPL-MCNC: 157 MG/DL (ref 65–140)
GLUCOSE SERPL-MCNC: 159 MG/DL (ref 65–140)
GLUCOSE SERPL-MCNC: 172 MG/DL (ref 65–140)
GLUCOSE SERPL-MCNC: 208 MG/DL (ref 65–140)
GP B STREP DNA CSF QL NAA+NON-PROBE: NOT DETECTED
GRAM STN SPEC: NORMAL
HAEM INFLU DNA CSF QL NAA+NON-PROBE: NOT DETECTED
HCT VFR BLD AUTO: 32 % (ref 34.8–46.1)
HGB BLD-MCNC: 9.8 G/DL (ref 11.5–15.4)
HHV6 DNA CSF QL NAA+NON-PROBE: NOT DETECTED
HSV1 DNA CSF QL NAA+NON-PROBE: NOT DETECTED
HSV2 DNA CSF QL NAA+NON-PROBE: NOT DETECTED
IMM GRANULOCYTES # BLD AUTO: 0.03 THOUSAND/UL (ref 0–0.2)
IMM GRANULOCYTES NFR BLD AUTO: 0 % (ref 0–2)
L MONOCYTOG DNA CSF QL NAA+NON-PROBE: NOT DETECTED
LYMPHOCYTES # BLD AUTO: 2.3 THOUSANDS/ÂΜL (ref 0.6–4.47)
LYMPHOCYTES NFR BLD AUTO: 31 % (ref 14–44)
MCH RBC QN AUTO: 20.4 PG (ref 26.8–34.3)
MCHC RBC AUTO-ENTMCNC: 30.6 G/DL (ref 31.4–37.4)
MCV RBC AUTO: 67 FL (ref 82–98)
MONOCYTES # BLD AUTO: 0.49 THOUSAND/ÂΜL (ref 0.17–1.22)
MONOCYTES NFR BLD AUTO: 7 % (ref 4–12)
N MEN DNA CSF QL NAA+NON-PROBE: NOT DETECTED
NEUTROPHILS # BLD AUTO: 4.35 THOUSANDS/ÂΜL (ref 1.85–7.62)
NEUTS SEG NFR BLD AUTO: 59 % (ref 43–75)
NRBC BLD AUTO-RTO: 0 /100 WBCS
PARECHOVIRUS A RNA CSF QL NAA+NON-PROBE: NOT DETECTED
PHOSPHATE SERPL-MCNC: 5.1 MG/DL (ref 2.7–4.5)
PLATELET # BLD AUTO: 271 THOUSANDS/UL (ref 149–390)
PMV BLD AUTO: 10 FL (ref 8.9–12.7)
POTASSIUM SERPL-SCNC: 3.9 MMOL/L (ref 3.5–5.3)
PROT CSF-MCNC: 37 MG/DL (ref 15–45)
RBC # BLD AUTO: 4.8 MILLION/UL (ref 3.81–5.12)
RBC # CSF MANUAL: 273 UL (ref 0–10)
S PNEUM DNA CSF QL NAA+NON-PROBE: NOT DETECTED
SODIUM SERPL-SCNC: 138 MMOL/L (ref 135–147)
TOTAL CELLS COUNTED BLD: NO
TUBE # CSF: 4
VZV DNA CSF QL NAA+NON-PROBE: NOT DETECTED
WBC # BLD AUTO: 7.4 THOUSAND/UL (ref 4.31–10.16)
WBC # CSF AUTO: 0 /UL (ref 0–5)

## 2024-06-07 PROCEDURE — 89050 BODY FLUID CELL COUNT: CPT | Performed by: INTERNAL MEDICINE

## 2024-06-07 PROCEDURE — 80048 BASIC METABOLIC PNL TOTAL CA: CPT | Performed by: INTERNAL MEDICINE

## 2024-06-07 PROCEDURE — 86592 SYPHILIS TEST NON-TREP QUAL: CPT | Performed by: INTERNAL MEDICINE

## 2024-06-07 PROCEDURE — 87206 SMEAR FLUORESCENT/ACID STAI: CPT | Performed by: INTERNAL MEDICINE

## 2024-06-07 PROCEDURE — 87476 LYME DIS DNA AMP PROBE: CPT | Performed by: INTERNAL MEDICINE

## 2024-06-07 PROCEDURE — 89051 BODY FLUID CELL COUNT: CPT | Performed by: INTERNAL MEDICINE

## 2024-06-07 PROCEDURE — 86039 ANTINUCLEAR ANTIBODIES (ANA): CPT | Performed by: INTERNAL MEDICINE

## 2024-06-07 PROCEDURE — 84157 ASSAY OF PROTEIN OTHER: CPT | Performed by: INTERNAL MEDICINE

## 2024-06-07 PROCEDURE — 99232 SBSQ HOSP IP/OBS MODERATE 35: CPT | Performed by: INTERNAL MEDICINE

## 2024-06-07 PROCEDURE — 009U3ZX DRAINAGE OF SPINAL CANAL, PERCUTANEOUS APPROACH, DIAGNOSTIC: ICD-10-PCS | Performed by: RADIOLOGY

## 2024-06-07 PROCEDURE — 82945 GLUCOSE OTHER FLUID: CPT | Performed by: INTERNAL MEDICINE

## 2024-06-07 PROCEDURE — 87798 DETECT AGENT NOS DNA AMP: CPT | Performed by: INTERNAL MEDICINE

## 2024-06-07 PROCEDURE — 87483 CNS DNA AMP PROBE TYPE 12-25: CPT | Performed by: INTERNAL MEDICINE

## 2024-06-07 PROCEDURE — 83916 OLIGOCLONAL BANDS: CPT | Performed by: INTERNAL MEDICINE

## 2024-06-07 PROCEDURE — 87496 CYTOMEG DNA AMP PROBE: CPT | Performed by: INTERNAL MEDICINE

## 2024-06-07 PROCEDURE — 82948 REAGENT STRIP/BLOOD GLUCOSE: CPT

## 2024-06-07 PROCEDURE — 87070 CULTURE OTHR SPECIMN AEROBIC: CPT | Performed by: INTERNAL MEDICINE

## 2024-06-07 PROCEDURE — 62328 DX LMBR SPI PNXR W/FLUOR/CT: CPT | Performed by: RADIOLOGY

## 2024-06-07 PROCEDURE — 83615 LACTATE (LD) (LDH) ENZYME: CPT | Performed by: INTERNAL MEDICINE

## 2024-06-07 PROCEDURE — 83873 ASSAY OF CSF PROTEIN: CPT | Performed by: INTERNAL MEDICINE

## 2024-06-07 PROCEDURE — 86334 IMMUNOFIX E-PHORESIS SERUM: CPT | Performed by: INTERNAL MEDICINE

## 2024-06-07 PROCEDURE — 85025 COMPLETE CBC W/AUTO DIFF WBC: CPT | Performed by: INTERNAL MEDICINE

## 2024-06-07 PROCEDURE — 87899 AGENT NOS ASSAY W/OPTIC: CPT | Performed by: INTERNAL MEDICINE

## 2024-06-07 PROCEDURE — 84100 ASSAY OF PHOSPHORUS: CPT | Performed by: INTERNAL MEDICINE

## 2024-06-07 PROCEDURE — 82042 OTHER SOURCE ALBUMIN QUAN EA: CPT | Performed by: INTERNAL MEDICINE

## 2024-06-07 PROCEDURE — 62270 DX LMBR SPI PNXR: CPT

## 2024-06-07 PROCEDURE — 82040 ASSAY OF SERUM ALBUMIN: CPT | Performed by: INTERNAL MEDICINE

## 2024-06-07 PROCEDURE — 84165 PROTEIN E-PHORESIS SERUM: CPT | Performed by: INTERNAL MEDICINE

## 2024-06-07 PROCEDURE — 87116 MYCOBACTERIA CULTURE: CPT | Performed by: INTERNAL MEDICINE

## 2024-06-07 PROCEDURE — 82784 ASSAY IGA/IGD/IGG/IGM EACH: CPT | Performed by: INTERNAL MEDICINE

## 2024-06-07 PROCEDURE — 87102 FUNGUS ISOLATION CULTURE: CPT | Performed by: INTERNAL MEDICINE

## 2024-06-07 RX ORDER — LIDOCAINE HYDROCHLORIDE 10 MG/ML
INJECTION, SOLUTION EPIDURAL; INFILTRATION; INTRACAUDAL; PERINEURAL AS NEEDED
Status: COMPLETED | OUTPATIENT
Start: 2024-06-07 | End: 2024-06-07

## 2024-06-07 RX ADMIN — LIDOCAINE HYDROCHLORIDE 2 ML: 10 INJECTION, SOLUTION EPIDURAL; INFILTRATION; INTRACAUDAL at 14:14

## 2024-06-07 RX ADMIN — ATORVASTATIN CALCIUM 80 MG: 40 TABLET, FILM COATED ORAL at 16:47

## 2024-06-07 RX ADMIN — INSULIN LISPRO 1 UNITS: 100 INJECTION, SOLUTION INTRAVENOUS; SUBCUTANEOUS at 08:18

## 2024-06-07 RX ADMIN — INSULIN LISPRO 1 UNITS: 100 INJECTION, SOLUTION INTRAVENOUS; SUBCUTANEOUS at 11:43

## 2024-06-07 RX ADMIN — SODIUM CHLORIDE, SODIUM LACTATE, POTASSIUM CHLORIDE, AND CALCIUM CHLORIDE 75 ML/HR: .6; .31; .03; .02 INJECTION, SOLUTION INTRAVENOUS at 01:55

## 2024-06-07 RX ADMIN — LIDOCAINE HYDROCHLORIDE 3 ML: 10 INJECTION, SOLUTION EPIDURAL; INFILTRATION; INTRACAUDAL at 14:02

## 2024-06-07 RX ADMIN — INSULIN LISPRO 3 UNITS: 100 INJECTION, SOLUTION INTRAVENOUS; SUBCUTANEOUS at 11:43

## 2024-06-07 RX ADMIN — ASPIRIN 81 MG: 81 TABLET, COATED ORAL at 08:12

## 2024-06-07 RX ADMIN — CYANOCOBALAMIN TAB 500 MCG 500 MCG: 500 TAB at 08:12

## 2024-06-07 RX ADMIN — INSULIN LISPRO 3 UNITS: 100 INJECTION, SOLUTION INTRAVENOUS; SUBCUTANEOUS at 08:12

## 2024-06-07 RX ADMIN — INSULIN GLARGINE 25 UNITS: 100 INJECTION, SOLUTION SUBCUTANEOUS at 21:22

## 2024-06-07 RX ADMIN — INSULIN LISPRO 3 UNITS: 100 INJECTION, SOLUTION INTRAVENOUS; SUBCUTANEOUS at 16:47

## 2024-06-07 RX ADMIN — SODIUM CHLORIDE, SODIUM LACTATE, POTASSIUM CHLORIDE, AND CALCIUM CHLORIDE 75 ML/HR: .6; .31; .03; .02 INJECTION, SOLUTION INTRAVENOUS at 16:47

## 2024-06-07 NOTE — SEDATION DOCUMENTATION
Lumbar puncture completed by Dr. Fung. 13 cc's clear fluid removed and sent to lab. Opening pressure 23 cm H2O. Band-aid to site. Patient tolerated well. Flat/bedrest for one hour.

## 2024-06-07 NOTE — PLAN OF CARE
Problem: PAIN - ADULT  Goal: Verbalizes/displays adequate comfort level or baseline comfort level  Description: Interventions:  - Encourage patient to monitor pain and request assistance  - Assess pain using appropriate pain scale  - Administer analgesics based on type and severity of pain and evaluate response  - Implement non-pharmacological measures as appropriate and evaluate response  - Consider cultural and social influences on pain and pain management  - Notify physician/advanced practitioner if interventions unsuccessful or patient reports new pain  Outcome: Progressing     Problem: INFECTION - ADULT  Goal: Absence or prevention of progression during hospitalization  Description: INTERVENTIONS:  - Assess and monitor for signs and symptoms of infection  - Monitor lab/diagnostic results  - Monitor all insertion sites, i.e. indwelling lines, tubes, and drains  - Monitor endotracheal if appropriate and nasal secretions for changes in amount and color  - Warner appropriate cooling/warming therapies per order  - Administer medications as ordered  - Instruct and encourage patient and family to use good hand hygiene technique  - Identify and instruct in appropriate isolation precautions for identified infection/condition  Outcome: Progressing     Problem: DISCHARGE PLANNING  Goal: Discharge to home or other facility with appropriate resources  Description: INTERVENTIONS:  - Identify barriers to discharge w/patient and caregiver  - Arrange for needed discharge resources and transportation as appropriate  - Identify discharge learning needs (meds, wound care, etc.)  - Arrange for interpretive services to assist at discharge as needed  - Refer to Case Management Department for coordinating discharge planning if the patient needs post-hospital services based on physician/advanced practitioner order or complex needs related to functional status, cognitive ability, or social support system  Outcome: Progressing      Problem: Knowledge Deficit  Goal: Patient/family/caregiver demonstrates understanding of disease process, treatment plan, medications, and discharge instructions  Description: Complete learning assessment and assess knowledge base.  Interventions:  - Provide teaching at level of understanding  - Provide teaching via preferred learning methods  Outcome: Progressing

## 2024-06-07 NOTE — INTERVAL H&P NOTE
Update: (This section must be completed if the H&P was completed greater than 24 hrs to procedure or admission)    H&P reviewed. After examining the patient, I find no changed to the H&P since it had been written.      Lumbar puncture recommended by neurology service  Prior garbled speech  Concern for intracranial hypertension    Patient reports that she believes she had some nerve injury during a epidural placement at the time of childbirth 20 something years ago    I discussed that nerve injuries risk.  Bleeding is a risk.  Informed consent obtained.    Patient re-evaluated. Accept as history and physical.    Angela Fung MD/June 7, 2024/1:40 PM

## 2024-06-07 NOTE — BRIEF OP NOTE (RAD/CATH)
INTERVENTIONAL RADIOLOGY PROCEDURE NOTE    Date: 6/7/2024    Procedure: IR LUMBAR PUNCTURE  Procedure Summary       Date:  Room / Location:     Anesthesia Start:  Anesthesia Stop:     Procedure:  Diagnosis:     Scheduled Providers:  Responsible Provider:     Anesthesia Type: Not recorded ASA Status: Not recorded            Preoperative diagnosis:   1. Hyperglycemia due to diabetes mellitus (HCC)    2. Tachycardia    3. Elevated troponin    4. Abnormal finding on MRI of brain    5. abnormal MRI of brain         Postoperative diagnosis: Same.    Surgeon: Angela Fung MD     Assistant: None. No qualified resident was available.    Blood loss: 0    Specimens:     CSF       Findings:       13 cc CSF    Initial tap bloody    Cleared over time    Opening pressure 23 cm H2O    2 levels used    Complications: None immediate.    Anesthesia: local

## 2024-06-07 NOTE — DISCHARGE INSTRUCTIONS
Lumbar Puncture     WHAT YOU NEED TO KNOW:   Lumbar puncture (LP) is a procedure in which a needle is inserted in your back and into your spinal canal. This is usually done to collect cerebrospinal fluid (CSF) to check for an infection, inflammation, bleeding, or other conditions that affect the brain. CSF is a clear, protective fluid that flows around the brain and inside the spinal canal. LP may also be done to remove CSF to reduce pressure in the brain.     DISCHARGE INSTRUCTIONS:     Follow up with your healthcare provider as directed: Write down your questions so you remember to ask them during your visits.     Post-lumbar puncture headache: You may develop a headache during the first few hours after your LP that may last for several days. The headache may be mild to severe and may get worse when you sit or stand. The following may help ease a post-lumbar puncture headache:  Drink plenty of liquids: You should drink more liquid than usual after your LP. Ask how much liquid is right for you. Caffeine may be used to treat a headache. Drinks, such as coffee, tea, or some sodas, have caffeine. Ask a Do not drink alcohol.    Lie down: If you have a headache after your lumbar puncture, it may be helpful to lie down and rest.  You may have a slight soreness over the LP area. This is normal.  Remove the band aid or dressing in 24 hours.    Contact Interventional Radiology imediately  at [phone] ([facility] PATIENTS: Contact Interventional Radiology at [phone]) ([facility] PATIENTS: Contact Interventional Radiology at [phone]) if any of the following occur:  You have a severe headache that does not get better after you lie down.  Persistent nausea or vomiting   You have a fever.   You have a stiff neck or have trouble thinking clearly.   Your legs, feet, or other parts below the waist feel numb, tingly, or weak.   You have bleeding or a discharge coming from the area where the needle was put into your back.   You  have severe pain in your back or neck.

## 2024-06-07 NOTE — PROGRESS NOTES
Formerly [facility]  Progress Note  Name: Faye Samuels I  MRN: [id number]  Unit/Bed#: MS [id number] I Date of Admission: 6/3/2024   Date of Service: 6/7/2024 I Hospital Day: 4    Assessment & Plan   abnormal MRI of brain  Assessment & Plan  Stated that she had episode of lightheadedness and blurred vision in April 2024 while at work when she was noted to have elevated blood pressure and however she did not come to the emergency room to get evaluated  Patient states that she had stopped taking her antihypertensive but recently she restarted them  Currently denies of lightheadedness or blurred vision, but she thinks that she may have had a mini stroke in April with strong family history of strokes, would resume aspirin daily,   MRI of the brain was reviewed shows possibility of intracranial hypertension, neurology was consulted and recommended lumbar puncture which will be scheduled on Friday and also MRI of the brain with contrast which shows prominent draining vein/venous varices dorsal to the left occipital condyle corresponding to the hyperintense T2/FLAIR focus noted on the noncontrast MRI exam  No acute pathological intra-axial enhancement noted.  lumbar puncture to be done on Friday by interventional radiology, will hydrate the patient prior to the procedure and also hold Lovenox a.m. dose, CSF labs ordered, need to follow-up the results  Continue aspirin and high-dose statin as per neurology recommendation  CTA head and neck was obtained shows no acute changes however shows dental caries which needs outpatient follow-up with dentist  Also recommend loop recorder or Zio patch at the time of discharge.    Tooth caries  Assessment & Plan  Carious right second mandibular premolar tooth with periapical lucency disrupting the buccal cortex noted on CTA of the head and neck   Needs dental eval as outpt    Morbid obesity (HCC)  Assessment & Plan  BMI of 38.22  Lifestyle/diet modifications    Essential  hypertension  Assessment & Plan  HCTZ and ACE inhibitor's were on hold initially, initially noted to have elevated creatinine.  Currently blood pressure and renal function back to normal.  Will resume ACE inhibitors.  HCTZ can be resumed at the time of discharge    Abnormal EKG  Assessment & Plan  Presenting EKG with evidence of Q waves and nonspecific T wave inversions in III and aVF  ED provider discussed with on-call cardiologist who feel nonstick changes are probably secondary to metabolic derangements as above -> continue IV fluid hydration and optimize blood sugar control  Telemetry and electrolytes monitoring - repeat EKG in the morning  Await official cardiology consultation  Baseline high-sensitivity troponin normal -> trend serial sets  6/5-cardiology initiated, EKG showed moderate T wave inversion in leads, no evidence for acute coronary syndrome,  MRI troponin elevation in the setting of hyperglycemia and volume depletion, tachycardia has resolved, 2D echocardiogram is unremarkable, no further inpatient cardiac workup necessary,  Lipid panel abnormal-started on low-dose of statin  Discontinue telemetry.    Elevated serum creatinine  Assessment & Plan  Presenting creatinine of 1.49 (baseline unknown)  Continue IV fluid hydration and monitor renal function and urine output  Limit/when nephrotoxins and hypotension as possible  Renal ultrasound-is normal    Anion gap metabolic acidosis  Assessment & Plan    Resolved     Type 2 diabetes mellitus with hyperglycemia (HCC)  Assessment & Plan    Presenting blood glucose of 321 -> 252 status post IV fluid hydration and a one-time regular insulin SC injection  In light of mild beta hydroxybutyrate elevation and anion gap metabolic acidosis, initiated on an insulin drip along with IV fluids (bicarbonate infusion)  Monitor serial Accu-Cheks with hopeful plan to wean off insulin drip to basal/prandial insulin in the next 12-24 hours  Monitor/replete serum  potassium/phosphate deficiencies if present  Will require ambulatory referral to endocrinology on discharge    [id number] of 13.8  Blood sugars are fairly controlled in the hospital, continue current insulin regimen, monitor Accu-Cheks.    * Generalized weakness  Assessment & Plan  Overall patient feels better and she states she is back to normal levels of strength.  Patient did not require PT and OT evaluation as she has returned completely back to her baseline.  Currently she is independent and ambulatory.             VTE Pharmacologic Prophylaxis:   Pharmacologic: Enoxaparin (Lovenox)  Mechanical VTE Prophylaxis in Place: Yes    Patient Centered Rounds: I have performed bedside rounds with nursing staff today.        Education and Discussions with Family / Patient: Discussed with patient    Time Spent for Care: 30 minutes.  More than 50% of total time spent on counseling and coordination of care as described above.    Current Length of Stay: 4 day(s)    Current Patient Status: Inpatient   Certification Statement: The patient will continue to require additional inpatient hospital stay due to lumbar puncture today and CSF labs need to be followed up    Discharge Plan: 24 to 48 hours after LP and CSF fluid analysis report    Code Status: Level 1 - Full Code      Subjective:   Patient feels much better today, denies any headache, dizziness, chest pain, shortness of breath.    Objective:     Vitals:   Temp (24hrs), Av.3 °F (36.8 °C), Min:97.6 °F (36.4 °C), Max:98.8 °F (37.1 °C)    Temp:  [97.6 °F (36.4 °C)-98.8 °F (37.1 °C)] 98.8 °F (37.1 °C)  HR:  [] 102  Resp:  [18-20] 20  BP: (122-152)/(76-96) 138/96  SpO2:  [96 %-98 %] 97 %  Body mass index is 38.62 kg/m².     Input and Output Summary (last 24 hours):       Intake/Output Summary (Last 24 hours) at 2024 1052  Last data filed at 2024 0155  Gross per 24 hour   Intake 2231.25 ml   Output 1200 ml   Net 1031.25 ml       Physical Exam:     Physical  Exam  General appearance:  Patient not in acute distress  Eyes:  Pupils equal reacting to light  ENT:  Moist oral mucous membranes  CVS:  S1-S2 heard, regular rate and rhythm, no pedal edema  Chest:  Bilateral air entry present, clear to auscultation  Abdomen:  Soft, nontender, bowel sounds present  CNS:  No focal neurological deficits  Genitourinary: deferred  Skin:  No acute rash   psychiatric:  No psychosis  Musculoskeletal:  No joint deformities       Additional Data:     Labs:    Results from last 7 days   Lab Units 06/07/24  0527   WBC Thousand/uL 7.40   HEMOGLOBIN g/dL 9.8*   HEMATOCRIT % 32.0*   PLATELETS Thousands/uL 271   SEGS PCT % 59   LYMPHO PCT % 31   MONO PCT % 7   EOS PCT % 3     Results from last 7 days   Lab Units 06/07/24  0527 06/03/24  2043 06/03/24  1459   SODIUM mmol/L 138   < > 135   POTASSIUM mmol/L 3.9   < > 3.7   CHLORIDE mmol/L 106   < > 99   CO2 mmol/L 23   < > 19*   BUN mg/dL 16   < > 29*   CREATININE mg/dL 0.64   < > 1.49*   ANION GAP mmol/L 9   < > 17*   CALCIUM mg/dL 8.9   < > 8.8   ALBUMIN g/dL  --   --  3.9   TOTAL BILIRUBIN mg/dL  --   --  0.39   ALK PHOS U/L  --   --  62   ALT U/L  --   --  20   AST U/L  --   --  17   GLUCOSE RANDOM mg/dL 208*   < > 321*    < > = values in this interval not displayed.     Results from last 7 days   Lab Units 06/06/24  0523   INR  1.15     Results from last 7 days   Lab Units 06/07/24  0718 06/06/24  2103 06/06/24  1608 06/06/24  1103 06/06/24  0705 06/05/24 2011 06/05/24  1544 06/05/24  1117 06/05/24  0735 06/04/24 2001 06/04/24  1556 06/04/24  1104   POC GLUCOSE mg/dl 172* 185* 177* 183* 146* 151* 134 222* 160* 205* 164* 255*     Results from last 7 days   Lab Units 06/03/24  1459   HEMOGLOBIN A1C % 13.8*               * I Have Reviewed All Lab Data Listed Above.  * Additional Pertinent Lab Tests Reviewed: All Labs For Current Hospital Admission Reviewed      Recent Cultures (last 7 days):           Last 24 Hours Medication List:   Current  Facility-Administered Medications   Medication Dose Route Frequency Provider Last Rate    acetaminophen  650 mg Oral Q6H PRN Maggi Calloway MD      aspirin  81 mg Oral Daily Kitty Salinas MD      atorvastatin  80 mg Oral Daily With Dinner Kitty Salinas MD      cyanocobalamin  500 mcg Oral Daily Kitty Salinas MD      enoxaparin  40 mg Subcutaneous Q24H EMILE Kitty Salinas MD      hydrALAZINE  5 mg Intravenous Q6H PRN Maggi Calloway MD      insulin glargine  25 Units Subcutaneous HS Irwin French MD      insulin lispro  1-6 Units Subcutaneous TID AC Irwin French MD      insulin lispro  3 Units Subcutaneous TID With Meals Kitty Salinas MD      lactated ringers  75 mL/hr Intravenous Continuous Kitty Salinas MD 75 mL/hr (06/07/24 0155)    trimethobenzamide  200 mg Intramuscular Q6H PRN Maggi Calloway MD          Today, Patient Was Seen By: Whit Morillo MD    ** Please Note: Dictation voice to text software may have been used in the creation of this document. **

## 2024-06-08 VITALS
DIASTOLIC BLOOD PRESSURE: 96 MMHG | BODY MASS INDEX: 38.41 KG/M2 | HEIGHT: 64 IN | WEIGHT: 225 LBS | OXYGEN SATURATION: 96 % | TEMPERATURE: 98.5 F | HEART RATE: 89 BPM | RESPIRATION RATE: 18 BRPM | SYSTOLIC BLOOD PRESSURE: 139 MMHG

## 2024-06-08 LAB
ANION GAP SERPL CALCULATED.3IONS-SCNC: 9 MMOL/L (ref 4–13)
BASOPHILS # BLD AUTO: 0.03 THOUSANDS/ÂΜL (ref 0–0.1)
BASOPHILS NFR BLD AUTO: 0 % (ref 0–1)
BUN SERPL-MCNC: 12 MG/DL (ref 5–25)
CALCIUM SERPL-MCNC: 6.2 MG/DL (ref 8.4–10.2)
CHLORIDE SERPL-SCNC: 118 MMOL/L (ref 96–108)
CO2 SERPL-SCNC: 14 MMOL/L (ref 21–32)
CREAT SERPL-MCNC: 0.42 MG/DL (ref 0.6–1.3)
EOSINOPHIL # BLD AUTO: 0.23 THOUSAND/ÂΜL (ref 0–0.61)
EOSINOPHIL NFR BLD AUTO: 3 % (ref 0–6)
ERYTHROCYTE [DISTWIDTH] IN BLOOD BY AUTOMATED COUNT: 16.1 % (ref 11.6–15.1)
GFR SERPL CREATININE-BSD FRML MDRD: 119 ML/MIN/1.73SQ M
GLUCOSE SERPL-MCNC: 122 MG/DL (ref 65–140)
GLUCOSE SERPL-MCNC: 148 MG/DL (ref 65–140)
GLUCOSE SERPL-MCNC: 219 MG/DL (ref 65–140)
HCT VFR BLD AUTO: 28.8 % (ref 34.8–46.1)
HGB BLD-MCNC: 8.7 G/DL (ref 11.5–15.4)
IMM GRANULOCYTES # BLD AUTO: 0.04 THOUSAND/UL (ref 0–0.2)
IMM GRANULOCYTES NFR BLD AUTO: 1 % (ref 0–2)
LYMPHOCYTES # BLD AUTO: 2.52 THOUSANDS/ÂΜL (ref 0.6–4.47)
LYMPHOCYTES NFR BLD AUTO: 36 % (ref 14–44)
MCH RBC QN AUTO: 20.2 PG (ref 26.8–34.3)
MCHC RBC AUTO-ENTMCNC: 30.2 G/DL (ref 31.4–37.4)
MCV RBC AUTO: 67 FL (ref 82–98)
MONOCYTES # BLD AUTO: 0.4 THOUSAND/ÂΜL (ref 0.17–1.22)
MONOCYTES NFR BLD AUTO: 6 % (ref 4–12)
NEUTROPHILS # BLD AUTO: 3.81 THOUSANDS/ÂΜL (ref 1.85–7.62)
NEUTS SEG NFR BLD AUTO: 54 % (ref 43–75)
NRBC BLD AUTO-RTO: 0 /100 WBCS
PHOSPHATE SERPL-MCNC: 3.2 MG/DL (ref 2.7–4.5)
PLATELET # BLD AUTO: 256 THOUSANDS/UL (ref 149–390)
PMV BLD AUTO: 10 FL (ref 8.9–12.7)
POTASSIUM SERPL-SCNC: 3.1 MMOL/L (ref 3.5–5.3)
RBC # BLD AUTO: 4.31 MILLION/UL (ref 3.81–5.12)
RHODAMINE-AURAMINE STN SPEC: NORMAL
SODIUM SERPL-SCNC: 141 MMOL/L (ref 135–147)
WBC # BLD AUTO: 7.03 THOUSAND/UL (ref 4.31–10.16)

## 2024-06-08 PROCEDURE — 84100 ASSAY OF PHOSPHORUS: CPT | Performed by: INTERNAL MEDICINE

## 2024-06-08 PROCEDURE — 99239 HOSP IP/OBS DSCHRG MGMT >30: CPT | Performed by: INTERNAL MEDICINE

## 2024-06-08 PROCEDURE — 80048 BASIC METABOLIC PNL TOTAL CA: CPT | Performed by: INTERNAL MEDICINE

## 2024-06-08 PROCEDURE — 85025 COMPLETE CBC W/AUTO DIFF WBC: CPT | Performed by: INTERNAL MEDICINE

## 2024-06-08 PROCEDURE — 82948 REAGENT STRIP/BLOOD GLUCOSE: CPT

## 2024-06-08 PROCEDURE — 99233 SBSQ HOSP IP/OBS HIGH 50: CPT | Performed by: PSYCHIATRY & NEUROLOGY

## 2024-06-08 RX ORDER — INSULIN LISPRO 100 [IU]/ML
3 INJECTION, SOLUTION INTRAVENOUS; SUBCUTANEOUS
Qty: 8.08 ML | Refills: 0 | Status: SHIPPED | OUTPATIENT
Start: 2024-06-08 | End: 2024-06-10

## 2024-06-08 RX ORDER — ATORVASTATIN CALCIUM 20 MG/1
20 TABLET, FILM COATED ORAL DAILY
Qty: 30 TABLET | Refills: 0 | Status: SHIPPED | OUTPATIENT
Start: 2024-06-08

## 2024-06-08 RX ORDER — POTASSIUM CHLORIDE 20 MEQ/1
40 TABLET, EXTENDED RELEASE ORAL ONCE
Status: COMPLETED | OUTPATIENT
Start: 2024-06-08 | End: 2024-06-08

## 2024-06-08 RX ADMIN — INSULIN LISPRO 3 UNITS: 100 INJECTION, SOLUTION INTRAVENOUS; SUBCUTANEOUS at 08:22

## 2024-06-08 RX ADMIN — ASPIRIN 81 MG: 81 TABLET, COATED ORAL at 08:22

## 2024-06-08 RX ADMIN — INSULIN LISPRO 2 UNITS: 100 INJECTION, SOLUTION INTRAVENOUS; SUBCUTANEOUS at 11:41

## 2024-06-08 RX ADMIN — SODIUM CHLORIDE, SODIUM LACTATE, POTASSIUM CHLORIDE, AND CALCIUM CHLORIDE 75 ML/HR: .6; .31; .03; .02 INJECTION, SOLUTION INTRAVENOUS at 08:21

## 2024-06-08 RX ADMIN — POTASSIUM CHLORIDE 40 MEQ: 1500 TABLET, EXTENDED RELEASE ORAL at 08:22

## 2024-06-08 RX ADMIN — CYANOCOBALAMIN TAB 500 MCG 500 MCG: 500 TAB at 08:22

## 2024-06-08 RX ADMIN — ENOXAPARIN SODIUM 40 MG: 40 INJECTION SUBCUTANEOUS at 08:22

## 2024-06-08 RX ADMIN — INSULIN LISPRO 3 UNITS: 100 INJECTION, SOLUTION INTRAVENOUS; SUBCUTANEOUS at 11:42

## 2024-06-08 NOTE — PROGRESS NOTES
Tooth caries  Assessment & Plan  Carious right second mandibular premolar tooth with periapical lucency disrupting the buccal cortex noted on CTA of the head and neck   Needs dental eval as outpt    abnormal MRI of brain  Assessment & Plan  Stated that she had episode of lightheadedness and blurred vision in April 2024 while at work when she was noted to have elevated blood pressure and however she did not come to the emergency room to get evaluated  Patient states that she had stopped taking her antihypertensive but recently she restarted them  Currently denies of lightheadedness or blurred vision, but she thinks that she may have had a mini stroke in April with strong family history of strokes, would resume aspirin daily,   MRI of the brain was reviewed shows possibility of intracranial hypertension, neurology was consulted and recommended lumbar puncture which will be scheduled on Friday and also MRI of the brain with contrast which shows prominent draining vein/venous varices dorsal to the left occipital condyle corresponding to the hyperintense T2/FLAIR focus noted on the noncontrast MRI exam  No acute pathological intra-axial enhancement noted.  lumbar puncture to be done on Friday by interventional radiology, will hydrate the patient prior to the procedure and also hold Lovenox a.m. dose, CSF labs ordered, need to follow-up the results  Continue aspirin and high-dose statin as per neurology recommendation  CTA head and neck was obtained shows no acute changes however shows dental caries which needs outpatient follow-up with dentist  Also recommend loop recorder or Zio patch at the time of discharge.    Morbid obesity (HCC)  Assessment & Plan  BMI of 38.22  Lifestyle/diet modifications    Essential hypertension  Assessment & Plan  HCTZ and ACE inhibitor's were on hold initially, initially noted to have elevated creatinine.  Currently blood pressure and renal function back to normal.  Will resume ACE  inhibitors.  HCTZ can be resumed at the time of discharge    Abnormal EKG  Assessment & Plan  Presenting EKG with evidence of Q waves and nonspecific T wave inversions in III and aVF  ED provider discussed with on-call cardiologist who feel nonstick changes are probably secondary to metabolic derangements as above -> continue IV fluid hydration and optimize blood sugar control  Telemetry and electrolytes monitoring - repeat EKG in the morning  Await official cardiology consultation  Baseline high-sensitivity troponin normal -> trend serial sets  6/5-cardiology initiated, EKG showed moderate T wave inversion in leads, no evidence for acute coronary syndrome,  MRI troponin elevation in the setting of hyperglycemia and volume depletion, tachycardia has resolved, 2D echocardiogram is unremarkable, no further inpatient cardiac workup necessary,  Lipid panel abnormal-started on low-dose of statin  Discontinue telemetry.    Elevated serum creatinine  Assessment & Plan  Presenting creatinine of 1.49 (baseline unknown)  Continue IV fluid hydration and monitor renal function and urine output  Limit/when nephrotoxins and hypotension as possible  Renal ultrasound-is normal    Anion gap metabolic acidosis  Assessment & Plan    Resolved     Type 2 diabetes mellitus with hyperglycemia (HCC)  Assessment & Plan    Presenting blood glucose of 321 -> 252 status post IV fluid hydration and a one-time regular insulin SC injection  In light of mild beta hydroxybutyrate elevation and anion gap metabolic acidosis, initiated on an insulin drip along with IV fluids (bicarbonate infusion)  Monitor serial Accu-Cheks with hopeful plan to wean off insulin drip to basal/prandial insulin in the next 12-24 hours  Monitor/replete serum potassium/phosphate deficiencies if present  Will require ambulatory referral to endocrinology on discharge    [id number] of 13.8  Blood sugars are fairly controlled in the hospital, continue current insulin regimen,  monitor Accu-Cheks.    * Generalized weakness  Assessment & Plan  Overall patient feels better and she states she is back to normal levels of strength.  Patient did not require PT and OT evaluation as she has returned completely back to her baseline.  Currently she is independent and ambulatory.        Discharging Physician / Practitioner: Kitty Salinas MD  PCP: Poonam Dunlap MD  Admission Date:   Admission Orders (From admission, onward)       Ordered        06/03/24 1719  INPATIENT ADMISSION  Once                          Discharge Date: 06/08/24    Medical Problems       Resolved Problems  Date Reviewed: 6/8/2024   None         Consultations During Hospital Stay:  NEUROLOGY consult     Significant Findings / Test Results:   MRI brain  with contrast shows There is no pathologic intra-axial enhancement.     Prominent draining vein/small venous varix dorsal to the left occipital condyle corresponding to the hyperintense T2/FLAIR focus noted on the noncontrast MRI examination.    Incidental Findings:   Prominent draining vein/small venous varix dorsal to the left occipital condyle corresponding to the hyperintense T2/FLAIR focus noted on the noncontrast MRI examination.   D/w patient and needs follow up with neurology     Test Results Pending at Discharge (will require follow up):   nil     Outpatient Tests Requested:  nil    Complications:  None    Reason for Admission: Generalized weakness    Hospital Course:     Faye Samuels is a 48 y.o. female patient who originally presented to the hospital on 6/3/2024 due to generalized weakness nausea and vomiting.  Patient was admitted with diabetes mellitus type 2 with hyperglycemia with anion gap metabolic acidosis, started on IV insulin drip and IV fluid hydration.  Noted to have beta hydroxybutyrate within normal pH on blood gas.  Patient was dehydrated, given IV fluid bolus and bicarbonate drip and clinically improved.  Also had mildly elevated  serum creatinine of 1.4.  Metformin was discontinued.  HbA1c was noted to be 13.8 and mealtime insulin was added and continued on insulin Lantus 25 units daily.  Non-MI troponin elevation noted and echocardiogram showed normal left ventricular size and thickness and systolic function was normal.  There is apical hypokinesis no significant valvular abnormalities noted right ventricular size and systolic function with normal.  Patient was evaluated by cardiologist and recommended outpatient stress testing and follow-up with office.  Also patient had reported episode of garbled speech on 4/16/2024 which spontaneously resolved.  Patient had MRI of the brain and neurology was consulted and recommended MRI with contrast as there was concern for intracranial hypertension with findings suggestive of partially empty sella and mildly prominent bilateral optic nerve sheaths and CSF spaces,.  MRI with contrast showed no acute pathologic intra-axial enhancement but prominent draining vein/small venous varix dorsal to the left occipital condyle corresponding to the hyperintense T2/FLAIR focus noted on the noncontrast MRI exam.  Lumbar puncture was also advised and the opening pressure was 2 cm of water, ME panel was negative, no evidence of bacterial meningitis, slightly bloody fluid noted, and rest of the workup of CSF fluid is still pending flow cytometry and MS panel.  Patient needs outpatient follow-up with neurology.  Also will continue aspirin 81 mg daily and low-dose statin for dyslipidemia.  2D echocardiogram was reviewed shows apex hypokinetic but otherwise unremarkable.  CTA head and neck was unremarkable.  Please see above list of diagnoses and related plan for additional information.     Condition at Discharge: fair     Discharge Day Visit / Exam:     Subjective: Feels better.  Denies of any chest pain or shortness of breath.  Vitals: Blood Pressure: 139/96 (06/08/24 0828)  Pulse: 89 (06/08/24 0828)  Temperature: 98.5  "°F (36.9 °C) (06/08/24 0828)  Temp Source: Oral (06/08/24 0828)  Respirations: 18 (06/08/24 0828)  Height: 5' 4\" (162.6 cm) (06/04/24 1455)  Weight - Scale: 102 kg (225 lb) (06/04/24 1455)  SpO2: 96 % (06/08/24 0828)  Exam:       HEENT-PERRLA, moist oral mucosa  Neck-supple, no JVD elevation   Respiratory-equal air entry bilaterally, no rales or rhonchi  Cardiovascular system-S1, S2 heard, no murmur or gallops or rubs  Abdomen-soft, nontender, no guarding or rigidity, bowel sounds heard  Extremities-no pedal edema  Peripheral pulses palpable  Musculoskeletal-no contractures  Central nervous system-no acute focal neurological deficit ,no sensory or motor deficit noted.  Skin-no rash noted        Discussion with Family: d/w      Discharge instructions/Information to patient and family:   See after visit summary for information provided to patient and family.      Provisions for Follow-Up Care:  See after visit summary for information related to follow-up care and any pertinent home health orders.      Disposition:     Home    Planned Readmission: None     Discharge Statement:  I spent 45 minutes discharging the patient. This time was spent on the day of discharge. I had direct contact with the patient on the day of discharge. Greater than 50% of the total time was spent examining patient, answering all patient questions, arranging and discussing plan of care with patient as well as directly providing post-discharge instructions.  Additional time then spent on discharge activities.    Discharge Medications:  See after visit summary for reconciled discharge medications provided to patient and family.      ** Please Note: This note has been constructed using a voice recognition system **  "

## 2024-06-08 NOTE — ASSESSMENT & PLAN NOTE
Presenting blood glucose of 321 -> 252 status post IV fluid hydration and a one-time regular insulin SC injection  In light of mild beta hydroxybutyrate elevation and anion gap metabolic acidosis, initiated on an insulin drip along with IV fluids (bicarbonate infusion)  Monitor serial Accu-Cheks with hopeful plan to wean off insulin drip to basal/prandial insulin in the next 12-24 hours  Monitor/replete serum potassium/phosphate deficiencies if present  Will require ambulatory referral to endocrinology on discharge    [id number] of 13.8  Blood sugars are fairly controlled in the hospital, continue current insulin regimen, monitor Accu-Cheks.

## 2024-06-08 NOTE — PROGRESS NOTES
"Progress Note - Neurology   Faye Samuels 48 y.o. female MRN: [id number]  Unit/Bed#: -01 Encounter: [id number]      Assessment & Plan     Concerning for IIH based on MRI finding  -patient's MRI brain wo demonstrated findings \"Partially empty sella and mildly prominent bilateral optic nerve sheaths CSF spaces suggesting intracranial hypertension\"  -LP opening pressure 23cm H2O. Discussed with patient, although this is slightly high than the normal range (20cm H2O), given patient denies any typical IIH symptoms and her body habitus, no indication to start treatment.  -She will need to follow up with neurology clinic. If any typical symptom occurs, will need to call clinic or go to ED for re-evaluation      Possible hx of  TIA  -reported one episode of garbled speech on 4/16/24 for unclear duration, but spontaneously resolved. Patient was unclear whether she had other neuro deficit simultaneously  -patient was concerned as she reported significant FMH of stroke and cardiac condition  -Echo noticed apex hypokinetic, but otherwise unremarkable  -CTA unremarkable  -continue ASA 81mg and lipitor 80 mg daily  - recommend loop recorder or Jaquan patch      Faye Samuels will need follow up in in 6 weeks with general attending. She will not require outpatient neurological testing.    Subjective:   Patient seen and examined. No acute event overnight.     ROS:  10 system reviewed, unremarkable other than above      Vitals: Blood pressure 139/96, pulse 89, temperature 98.5 °F (36.9 °C), temperature source Oral, resp. rate 18, height 5' 4\" (1.626 m), weight 102 kg (225 lb), last menstrual period 05/12/2024, SpO2 96%.,Body mass index is 38.62 kg/m².    Physical Exam:   GEN: in no acute distress, well-developed, well nourished  HEENT: normocephalic,  Nose and ears grossly normal in appearance.  CV:  no pedal edema.  Normotensive  PULM: airways patent, non-labored breathing   ABD:  Nondistended  EXT: no   edema or erythema.  " No joint swelling  SKIN: no rashes or lesions.     NEURO:        Mental Status: Alert and oriented to person, place, and year. Interactive, able to follow commands.  Answers questions appropriately       Speech: Intact Articulation         CN 2-12: grossly intact       Motor: can move all extremities symmetrically      Sensory:  Reported intact light touch and pinprick throughout        Reflexes:  Not able to assess during tele visit       Coordination: no ataxia with finger-to-nose and heel-to-shin testing             Gait/Station: Deferred        Cortical: No Extinction      Lab, Imaging and other studies: CBC:   Results from last 7 days   Lab Units 06/08/24 0528 06/07/24 0527 06/06/24 0523   WBC Thousand/uL 7.03 7.40 6.28   RBC Million/uL 4.31 4.80 5.01   HEMOGLOBIN g/dL 8.7* 9.8* 10.1*   HEMATOCRIT % 28.8* 32.0* 32.8*   MCV fL 67* 67* 66*   PLATELETS Thousands/uL 256 271 279   , BMP/CMP:   Results from last 7 days   Lab Units 06/08/24 0528 06/07/24 0527 06/06/24 0523 06/03/24 2043 06/03/24  1459   SODIUM mmol/L 141 138 139   < > 135   POTASSIUM mmol/L 3.1* 3.9 3.8   < > 3.7   CHLORIDE mmol/L 118* 106 105   < > 99   CO2 mmol/L 14* 23 26   < > 19*   BUN mg/dL 12 16 12   < > 29*   CREATININE mg/dL 0.42* 0.64 0.70   < > 1.49*   CALCIUM mg/dL 6.2* 8.9 9.0   < > 8.8   AST U/L  --   --   --   --  17   ALT U/L  --   --   --   --  20   ALK PHOS U/L  --   --   --   --  62   EGFR ml/min/1.73sq m 119 104 101   < > 40    < > = values in this interval not displayed.   , TSH:   Results from last 7 days   Lab Units 06/03/24  1459   TSH 3RD GENERATON uIU/mL 4.826*   , Coagulation:   Results from last 7 days   Lab Units 06/06/24  0523   INR  1.15     VTE Prophylaxis: Heparin    Counseling / Coordination of Care  Total time spent today 51 minutes. Greater than 50% of total time was spent with the patient and / or family counseling and / or coordination of care. A description of the counseling / coordination of care:  impression, management,  follow up

## 2024-06-08 NOTE — PLAN OF CARE
Problem: PAIN - ADULT  Goal: Verbalizes/displays adequate comfort level or baseline comfort level  Description: Interventions:  - Encourage patient to monitor pain and request assistance  - Assess pain using appropriate pain scale  - Administer analgesics based on type and severity of pain and evaluate response  - Implement non-pharmacological measures as appropriate and evaluate response  - Consider cultural and social influences on pain and pain management  - Notify physician/advanced practitioner if interventions unsuccessful or patient reports new pain  Outcome: Progressing     Problem: INFECTION - ADULT  Goal: Absence or prevention of progression during hospitalization  Description: INTERVENTIONS:  - Assess and monitor for signs and symptoms of infection  - Monitor lab/diagnostic results  - Monitor all insertion sites, i.e. indwelling lines, tubes, and drains  - Monitor endotracheal if appropriate and nasal secretions for changes in amount and color  - [place] appropriate cooling/warming therapies per order  - Administer medications as ordered  - Instruct and encourage patient and family to use good hand hygiene technique  - Identify and instruct in appropriate isolation precautions for identified infection/condition  Outcome: Progressing     Problem: DISCHARGE PLANNING  Goal: Discharge to home or other facility with appropriate resources  Description: INTERVENTIONS:  - Identify barriers to discharge w/patient and caregiver  - Arrange for needed discharge resources and transportation as appropriate  - Identify discharge learning needs (meds, wound care, etc.)  - Arrange for interpretive services to assist at discharge as needed  - Refer to Case Management Department for coordinating discharge planning if the patient needs post-hospital services based on physician/advanced practitioner order or complex needs related to functional status, cognitive ability, or social support system  Outcome: Progressing      Problem: Knowledge Deficit  Goal: Patient/family/caregiver demonstrates understanding of disease process, treatment plan, medications, and discharge instructions  Description: Complete learning assessment and assess knowledge base.  Interventions:  - Provide teaching at level of understanding  - Provide teaching via preferred learning methods  Outcome: Progressing

## 2024-06-08 NOTE — DISCHARGE SUMMARY
Tooth caries  Assessment & Plan  Carious right second mandibular premolar tooth with periapical lucency disrupting the buccal cortex noted on CTA of the head and neck   Needs dental eval as outpt    abnormal MRI of brain  Assessment & Plan  Stated that she had episode of lightheadedness and blurred vision in April 2024 while at work when she was noted to have elevated blood pressure and however she did not come to the emergency room to get evaluated  Patient states that she had stopped taking her antihypertensive but recently she restarted them  Currently denies of lightheadedness or blurred vision, but she thinks that she may have had a mini stroke in April with strong family history of strokes, would resume aspirin daily,   MRI of the brain was reviewed shows possibility of intracranial hypertension, neurology was consulted and recommended lumbar puncture which will be scheduled on Friday and also MRI of the brain with contrast which shows prominent draining vein/venous varices dorsal to the left occipital condyle corresponding to the hyperintense T2/FLAIR focus noted on the noncontrast MRI exam  No acute pathological intra-axial enhancement noted.  lumbar puncture to be done on Friday by interventional radiology, will hydrate the patient prior to the procedure and also hold Lovenox a.m. dose, CSF labs ordered, need to follow-up the results  Continue aspirin and high-dose statin as per neurology recommendation  CTA head and neck was obtained shows no acute changes however shows dental caries which needs outpatient follow-up with dentist  Also recommend loop recorder or Zio patch at the time of discharge.    Morbid obesity (HCC)  Assessment & Plan  BMI of 38.22  Lifestyle/diet modifications    Essential hypertension  Assessment & Plan  HCTZ and ACE inhibitor's were on hold initially, initially noted to have elevated creatinine.  Currently blood pressure and renal function back to normal.  Will resume ACE  inhibitors.  HCTZ can be resumed at the time of discharge    Abnormal EKG  Assessment & Plan  Presenting EKG with evidence of Q waves and nonspecific T wave inversions in III and aVF  ED provider discussed with on-call cardiologist who feel nonstick changes are probably secondary to metabolic derangements as above -> continue IV fluid hydration and optimize blood sugar control  Telemetry and electrolytes monitoring - repeat EKG in the morning  Await official cardiology consultation  Baseline high-sensitivity troponin normal -> trend serial sets  6/5-cardiology initiated, EKG showed moderate T wave inversion in leads, no evidence for acute coronary syndrome,  MRI troponin elevation in the setting of hyperglycemia and volume depletion, tachycardia has resolved, 2D echocardiogram is unremarkable, no further inpatient cardiac workup necessary,  Lipid panel abnormal-started on low-dose of statin  Discontinue telemetry.    Elevated serum creatinine  Assessment & Plan  Presenting creatinine of 1.49 (baseline unknown)  Continue IV fluid hydration and monitor renal function and urine output  Limit/when nephrotoxins and hypotension as possible  Renal ultrasound-is normal    Anion gap metabolic acidosis  Assessment & Plan    Resolved     Type 2 diabetes mellitus with hyperglycemia (HCC)  Assessment & Plan    Presenting blood glucose of 321 -> 252 status post IV fluid hydration and a one-time regular insulin SC injection  In light of mild beta hydroxybutyrate elevation and anion gap metabolic acidosis, initiated on an insulin drip along with IV fluids (bicarbonate infusion)  Monitor serial Accu-Cheks with hopeful plan to wean off insulin drip to basal/prandial insulin in the next 12-24 hours  Monitor/replete serum potassium/phosphate deficiencies if present  Will require ambulatory referral to endocrinology on discharge    [id number] of 13.8  Blood sugars are fairly controlled in the hospital, continue current insulin regimen,  monitor Accu-Cheks.    * Generalized weakness  Assessment & Plan  Overall patient feels better and she states she is back to normal levels of strength.  Patient did not require PT and OT evaluation as she has returned completely back to her baseline.  Currently she is independent and ambulatory.        Discharging Physician / Practitioner: Kitty Salinas MD  PCP: Pooanm Dunlap MD  Admission Date:   Admission Orders (From admission, onward)       Ordered        06/03/24 1719  INPATIENT ADMISSION  Once                          Discharge Date: 06/08/24    Medical Problems       Resolved Problems  Date Reviewed: 6/8/2024   None         Consultations During Hospital Stay:  NEUROLOGY consult     Significant Findings / Test Results:   MRI brain  with contrast shows There is no pathologic intra-axial enhancement.     Prominent draining vein/small venous varix dorsal to the left occipital condyle corresponding to the hyperintense T2/FLAIR focus noted on the noncontrast MRI examination.    Incidental Findings:   Prominent draining vein/small venous varix dorsal to the left occipital condyle corresponding to the hyperintense T2/FLAIR focus noted on the noncontrast MRI examination.   D/w patient and needs follow up with neurology     Test Results Pending at Discharge (will require follow up):   nil     Outpatient Tests Requested:  nil    Complications:  None    Reason for Admission: Generalized weakness    Hospital Course:     Faye Samuels is a 48 y.o. female patient who originally presented to the hospital on 6/3/2024 due to generalized weakness nausea and vomiting.  Patient was admitted with diabetes mellitus type 2 with hyperglycemia with anion gap metabolic acidosis, started on IV insulin drip and IV fluid hydration.  Noted to have beta hydroxybutyrate within normal pH on blood gas.  Patient was dehydrated, given IV fluid bolus and bicarbonate drip and clinically improved.  Also had mildly elevated  serum creatinine of 1.4.  Metformin was discontinued.  HbA1c was noted to be 13.8 and mealtime insulin was added and continued on insulin Lantus 25 units daily.  Non-MI troponin elevation noted and echocardiogram showed normal left ventricular size and thickness and systolic function was normal.  There is apical hypokinesis no significant valvular abnormalities noted right ventricular size and systolic function with normal.  Patient was evaluated by cardiologist and recommended outpatient stress testing and follow-up with office.  Also patient had reported episode of garbled speech on 4/16/2024 which spontaneously resolved.  Patient had MRI of the brain and neurology was consulted and recommended MRI with contrast as there was concern for intracranial hypertension with findings suggestive of partially empty sella and mildly prominent bilateral optic nerve sheaths and CSF spaces,.  MRI with contrast showed no acute pathologic intra-axial enhancement but prominent draining vein/small venous varix dorsal to the left occipital condyle corresponding to the hyperintense T2/FLAIR focus noted on the noncontrast MRI exam.  Lumbar puncture was also advised and the opening pressure was 2 cm of water, ME panel was negative, no evidence of bacterial meningitis, slightly bloody fluid noted, and rest of the workup of CSF fluid is still pending flow cytometry and MS panel.  Patient needs outpatient follow-up with neurology.  Also will continue aspirin 81 mg daily and low-dose statin for dyslipidemia.  2D echocardiogram was reviewed shows apex hypokinetic but otherwise unremarkable.  CTA head and neck was unremarkable.  Please see above list of diagnoses and related plan for additional information.     Condition at Discharge: fair     Discharge Day Visit / Exam:     Subjective: Feels better.  Denies of any chest pain or shortness of breath.  Vitals: Blood Pressure: 139/96 (06/08/24 0828)  Pulse: 89 (06/08/24 0828)  Temperature: 98.5  "°F (36.9 °C) (06/08/24 0828)  Temp Source: Oral (06/08/24 0828)  Respirations: 18 (06/08/24 0828)  Height: 5' 4\" (162.6 cm) (06/04/24 1455)  Weight - Scale: 102 kg (225 lb) (06/04/24 1455)  SpO2: 96 % (06/08/24 0828)  Exam:       HEENT-PERRLA, moist oral mucosa  Neck-supple, no JVD elevation   Respiratory-equal air entry bilaterally, no rales or rhonchi  Cardiovascular system-S1, S2 heard, no murmur or gallops or rubs  Abdomen-soft, nontender, no guarding or rigidity, bowel sounds heard  Extremities-no pedal edema  Peripheral pulses palpable  Musculoskeletal-no contractures  Central nervous system-no acute focal neurological deficit ,no sensory or motor deficit noted.  Skin-no rash noted        Discussion with Family: d/w      Discharge instructions/Information to patient and family:   See after visit summary for information provided to patient and family.      Provisions for Follow-Up Care:  See after visit summary for information related to follow-up care and any pertinent home health orders.      Disposition:     Home    Planned Readmission: None     Discharge Statement:  I spent 45 minutes discharging the patient. This time was spent on the day of discharge. I had direct contact with the patient on the day of discharge. Greater than 50% of the total time was spent examining patient, answering all patient questions, arranging and discussing plan of care with patient as well as directly providing post-discharge instructions.  Additional time then spent on discharge activities.    Discharge Medications:  See after visit summary for reconciled discharge medications provided to patient and family.      ** Please Note: This note has been constructed using a voice recognition system **  "

## 2024-06-08 NOTE — DISCHARGE INSTR - AVS FIRST PAGE
Dear Faye Samuels,     [facility]  It is our hope that we were always able to exceed the expected standards for your care during your stay.  You were hospitalized due to ***.  You were cared for on the *** floor under the service of Kitty Salinas MD with the [facility] who covers for your primary care physician (PCP), Poonam Dunlap MD, while you were hospitalized.  If you have any questions or concerns related to this hospitalization, you may contact us at .  For follow up as well as medication refills, we recommend that you follow up with your primary care physician.  A registered nurse will reach out to you by phone within a few days after your discharge to answer any additional questions that you may have after going home.  However, at this time we provide for you here, the most important instructions / recommendations at discharge:     Notable Medication Adjustments -   ***  Testing Required after Discharge -   ***  ** Please contact your PCP to request testing orders for any of the testing recommended here **  Important follow up information -   ***  Other Instructions -   ***  Please review this entire after visit summary as additional general instructions including medication list, appointments, activity, diet, any pertinent wound care, and other additional recommendations from your care team that may be provided for you.      Sincerely,     Kitty Salinas MD

## 2024-06-10 LAB
ALBUMIN SERPL ELPH-MCNC: 3.16 G/DL (ref 3.2–5.1)
ALBUMIN SERPL ELPH-MCNC: 51 % (ref 48–70)
ALPHA1 GLOB SERPL ELPH-MCNC: 0.33 G/DL (ref 0.15–0.47)
ALPHA1 GLOB SERPL ELPH-MCNC: 5.3 % (ref 1.8–7)
ALPHA2 GLOB SERPL ELPH-MCNC: 0.8 G/DL (ref 0.42–1.04)
ALPHA2 GLOB SERPL ELPH-MCNC: 12.9 % (ref 5.9–14.9)
BACTERIA CSF CULT: NO GROWTH
BETA GLOB ABNORMAL SERPL ELPH-MCNC: 0.48 G/DL (ref 0.31–0.57)
BETA1 GLOB SERPL ELPH-MCNC: 7.8 % (ref 4.7–7.7)
BETA2 GLOB SERPL ELPH-MCNC: 8.7 % (ref 3.1–7.9)
BETA2+GAMMA GLOB SERPL ELPH-MCNC: 0.54 G/DL (ref 0.2–0.58)
FUNGUS SPEC CULT: NORMAL
GAMMA GLOB ABNORMAL SERPL ELPH-MCNC: 0.89 G/DL (ref 0.4–1.66)
GAMMA GLOB SERPL ELPH-MCNC: 14.3 % (ref 6.9–22.3)
IGG/ALB SER: 1.04 {RATIO} (ref 1.1–1.8)
INTERPRETATION UR IFE-IMP: NORMAL
PROT PATTERN SERPL ELPH-IMP: ABNORMAL
PROT SERPL-MCNC: 6.2 G/DL (ref 6.4–8.2)

## 2024-06-10 PROCEDURE — 86334 IMMUNOFIX E-PHORESIS SERUM: CPT | Performed by: PATHOLOGY

## 2024-06-10 PROCEDURE — 84165 PROTEIN E-PHORESIS SERUM: CPT | Performed by: PATHOLOGY

## 2024-06-10 RX ORDER — INSULIN LISPRO 100 [IU]/ML
3 INJECTION, SOLUTION INTRAVENOUS; SUBCUTANEOUS
Qty: 15 ML | Refills: 0 | Status: SHIPPED | OUTPATIENT
Start: 2024-06-10

## 2024-06-10 NOTE — UTILIZATION REVIEW
NOTIFICATION OF ADMISSION DISCHARGE   This is a Notification of Discharge from [facility]. Please be advised that this patient has been discharge from our facility. Below you will find the admission and discharge date and time including the patient’s disposition.   UTILIZATION REVIEW CONTACT:  Lilian Barrios  Utilization   Network Utilization Review Department  Phone: [phone] x carefully listen to the prompts. All voicemails are confidential.  Email: [email or web address]     ADMISSION INFORMATION  PRESENTATION DATE: 6/3/2024  2:20 PM  OBERVATION ADMISSION DATE:   INPATIENT ADMISSION DATE: 6/3/24  5:19 PM   DISCHARGE DATE: 6/8/2024  1:10 PM   DISPOSITION:Home/Self Care    Network Utilization Review Department  ATTENTION: Please call with any questions or concerns to [phone] and carefully listen to the prompts so that you are directed to the right person. All voicemails are confidential.   For Discharge needs, contact Care Management DC Support Team at [phone] opt. 2  Send all requests for admission clinical reviews, approved or denied determinations and any other requests to dedicated fax number below belonging to the campus where the patient is receiving treatment. List of dedicated fax numbers for the Facilities:  FACILITY NAME UR FAX NUMBER   ADMISSION DENIALS (Administrative/Medical Necessity) [phone]   DISCHARGE SUPPORT TEAM ([facility]) [phone]   PARENT CHILD HEALTH (Maternity/NICU/Pediatrics) [phone]   [facility] [phone]   [facility] [phone]   Critical access hospital [phone]   [facility] [phone]   [facility] [phone]   [facility] [phone]   [facility] [phone]   [facility]  [phone]   [facility] [phone]   Critical access hospital [phone]   [facility] [phone]   [facility] [phone]

## 2024-06-11 LAB
LDH FLD L TO P-CCNC: 26 IU/L
MYCOBACTERIUM SPEC CULT: NORMAL
RHODAMINE-AURAMINE STN SPEC: NORMAL

## 2024-06-11 NOTE — PROGRESS NOTES
Can you contact this pt to schedule stress test and let me know so that I can contact her for a f/u? Thank you.

## 2024-06-12 LAB — MISCELLANEOUS LAB TEST RESULT: NORMAL

## 2024-06-13 NOTE — PROGRESS NOTES
Attempted to call pt and schedule f/u sometime after NM Stress test on 6/27/2024. Call would not go through and could not leave v/m. Will try again another time.

## 2024-06-14 LAB
OLIGOCLONAL BANDS.IT SER+CSF QL: NORMAL
WNV RNA SPEC QL NAA+PROBE: NOT DETECTED

## 2024-06-17 ENCOUNTER — TELEPHONE (OUTPATIENT)
Dept: CARDIOLOGY CLINIC | Facility: CLINIC | Age: 49
End: 2024-06-17

## 2024-06-17 LAB — FUNGUS SPEC CULT: NORMAL

## 2024-06-17 NOTE — TELEPHONE ENCOUNTER
I left a v/m for pt call office to sched f/u appt after testing.  Visit can be with BRENDA Billingsley.

## 2024-06-18 LAB
MYCOBACTERIUM SPEC CULT: NORMAL
RHODAMINE-AURAMINE STN SPEC: NORMAL

## 2024-06-19 LAB
B BURGDOR DNA SPEC QL NAA+PROBE: NEGATIVE
B BURGDOR DNA SPEC QL NAA+PROBE: NEGATIVE

## 2024-06-24 LAB — FUNGUS SPEC CULT: NORMAL

## 2024-06-25 LAB
MYCOBACTERIUM SPEC CULT: NORMAL
RHODAMINE-AURAMINE STN SPEC: NORMAL

## 2024-06-26 ENCOUNTER — DOCUMENTATION (OUTPATIENT)
Dept: CARDIOLOGY CLINIC | Facility: CLINIC | Age: 49
End: 2024-06-26

## 2024-06-26 LAB
ALB CSF/SERPL: 3 {RATIO} (ref 0–8)
ALBUMIN CSF-MCNC: 12 MG/DL (ref 8–37)
ALBUMIN MFR CSF ELPH: NORMAL %
ALBUMIN SERPL-MCNC: 3.6 G/DL (ref 3.9–4.9)
ALPHA1 GLOB MFR CSF ELPH: NORMAL %
ALPHA2 GLOB MFR CSF ELPH: NORMAL %
ANA ELISA RESULT: NORMAL RATIO
ANTINUCLEAR ANTIBODY BY ELISA: NORMAL
B-GLOBULIN MFR CSF ELPH: NORMAL %
GAMMA GLOB MFR CSF ELPH: NORMAL %
IGG CSF-MCNC: 2.4 MG/DL (ref 0–6.7)
IGG SERPL-MCNC: 1016 MG/DL (ref 586–1602)
IGG SYNTH RATE SER+CSF CALC-MRATE: 0.4 MG/DAY
IGG/ALB CLEAR SER+CSF-RTO: 0.7 (ref 0–0.7)
IGG/ALB CSF: 0.2 {RATIO} (ref 0–0.25)
INTERPRETATION CSF IFE-IMP: NORMAL
Lab: NORMAL
MBP CSF-MCNC: ABNORMAL NG/ML
OLIGOCLONAL BANDS CSF ELPH-IMP: NORMAL %
OLIGOCLONAL BANDS.IT SER+CSF QL: ABNORMAL
PREALB MFR CSF ELPH: NORMAL %
PROT CSF-MCNC: NORMAL MG/DL
RATIO: NORMAL

## 2024-06-26 NOTE — PROGRESS NOTES
I spoke with her insurance company regarding the nuclear stress test.  Call reference #[id number].  Reviewed her abnormal electrocardiogram with inferior T wave inversions and echocardiogram findings of hypokinetic apex.  They have authorized her NM MPI.  Authorization #[id number].

## 2024-06-27 ENCOUNTER — HOSPITAL ENCOUNTER (OUTPATIENT)
Dept: NON INVASIVE DIAGNOSTICS | Facility: HOSPITAL | Age: 49
Discharge: HOME | End: 2024-06-27
Attending: INTERNAL MEDICINE
Payer: COMMERCIAL

## 2024-06-27 ENCOUNTER — HOSPITAL ENCOUNTER (OUTPATIENT)
Facility: HOSPITAL | Age: 49
End: 2024-06-27
Attending: INTERNAL MEDICINE
Payer: COMMERCIAL

## 2024-06-27 VITALS — HEIGHT: 64 IN | WEIGHT: 225 LBS | BODY MASS INDEX: 38.41 KG/M2

## 2024-06-27 DIAGNOSIS — R94.31 ABNORMAL ECG: ICD-10-CM

## 2024-06-27 DIAGNOSIS — R79.89 ELEVATED TROPONIN: ICD-10-CM

## 2024-06-27 LAB
NUC STRESS EJECTION FRACTION: 68 %
RATE PRESSURE PRODUCT: NORMAL
SL CV REST NUCLEAR ISOTOPE DOSE: 9.8 MCI
SL CV STRESS NUCLEAR ISOTOPE DOSE: 32.5 MCI
SL CV STRESS RECOVERY BP: NORMAL MMHG
SL CV STRESS RECOVERY HR: 105 BPM
SL CV STRESS RECOVERY O2 SAT: 100 %
STRESS ANGINA INDEX: 0
STRESS BASELINE BP: NORMAL MMHG
STRESS BASELINE HR: 88 BPM
STRESS O2 SAT REST: 98 %
STRESS PEAK HR: 122 BPM
STRESS POST EXERCISE DUR MIN: 3 MIN
STRESS POST O2 SAT PEAK: 99 %
STRESS POST PEAK BP: 104 MMHG
STRESS/REST PERFUSION RATIO: 0.93

## 2024-06-27 PROCEDURE — 78452 HT MUSCLE IMAGE SPECT MULT: CPT

## 2024-06-27 PROCEDURE — A9502 TC99M TETROFOSMIN: HCPCS

## 2024-06-27 PROCEDURE — 78452 HT MUSCLE IMAGE SPECT MULT: CPT | Performed by: INTERNAL MEDICINE

## 2024-06-27 PROCEDURE — 93016 CV STRESS TEST SUPVJ ONLY: CPT | Performed by: INTERNAL MEDICINE

## 2024-06-27 PROCEDURE — 93017 CV STRESS TEST TRACING ONLY: CPT

## 2024-06-27 PROCEDURE — 93018 CV STRESS TEST I&R ONLY: CPT | Performed by: INTERNAL MEDICINE

## 2024-06-27 RX ORDER — REGADENOSON 0.08 MG/ML
0.4 INJECTION, SOLUTION INTRAVENOUS ONCE
Status: COMPLETED | OUTPATIENT
Start: 2024-06-27 | End: 2024-06-27

## 2024-06-27 RX ADMIN — REGADENOSON 0.4 MG: 0.08 INJECTION, SOLUTION INTRAVENOUS at 12:02

## 2024-06-30 LAB
CHEST PAIN STATEMENT: NORMAL
MAX DIASTOLIC BP: 68 MMHG
MAX PREDICTED HEART RATE: 170 BPM
PROTOCOL NAME: NORMAL
REASON FOR TERMINATION: NORMAL
STRESS POST EXERCISE DUR MIN: 3 MIN
STRESS POST EXERCISE DUR SEC: 0 SEC
STRESS POST PEAK HR: 122 BPM
STRESS POST PEAK SYSTOLIC BP: 126 MMHG
TARGET HR FORMULA: NORMAL
TEST INDICATION: NORMAL

## 2024-07-01 LAB
FUNGUS SPEC CULT: NORMAL
REAGIN AB CSF QL: NORMAL

## 2024-07-02 LAB
MISCELLANEOUS LAB TEST RESULT: NORMAL
MYCOBACTERIUM SPEC CULT: NORMAL
RHODAMINE-AURAMINE STN SPEC: NORMAL

## 2024-07-08 LAB — FUNGUS SPEC CULT: NORMAL

## 2024-07-09 LAB
MYCOBACTERIUM SPEC CULT: NORMAL
RHODAMINE-AURAMINE STN SPEC: NORMAL

## 2024-07-16 LAB
MYCOBACTERIUM SPEC CULT: NORMAL
RHODAMINE-AURAMINE STN SPEC: NORMAL

## 2024-07-23 LAB
MYCOBACTERIUM SPEC CULT: NORMAL
RHODAMINE-AURAMINE STN SPEC: NORMAL

## 2024-08-01 ENCOUNTER — TELEMEDICINE (OUTPATIENT)
Dept: ENDOCRINOLOGY | Facility: CLINIC | Age: 49
End: 2024-08-01
Payer: COMMERCIAL

## 2024-08-01 DIAGNOSIS — E11.65 TYPE 2 DIABETES MELLITUS WITH HYPERGLYCEMIA, WITH LONG-TERM CURRENT USE OF INSULIN (HCC): Primary | ICD-10-CM

## 2024-08-01 DIAGNOSIS — E78.2 MIXED HYPERLIPIDEMIA: ICD-10-CM

## 2024-08-01 DIAGNOSIS — E66.01 MORBID OBESITY (HCC): ICD-10-CM

## 2024-08-01 DIAGNOSIS — Z79.4 TYPE 2 DIABETES MELLITUS WITH HYPERGLYCEMIA, WITH LONG-TERM CURRENT USE OF INSULIN (HCC): Primary | ICD-10-CM

## 2024-08-01 DIAGNOSIS — R63.5 WEIGHT GAIN: ICD-10-CM

## 2024-08-01 DIAGNOSIS — E11.65 TYPE 2 DIABETES MELLITUS WITH HYPERGLYCEMIA (HCC): ICD-10-CM

## 2024-08-01 PROCEDURE — 99244 OFF/OP CNSLTJ NEW/EST MOD 40: CPT | Performed by: INTERNAL MEDICINE

## 2024-08-01 RX ORDER — BLOOD-GLUCOSE SENSOR
1 EACH MISCELLANEOUS
Qty: 2 EACH | Refills: 0 | Status: SHIPPED | OUTPATIENT
Start: 2024-08-01

## 2024-08-01 RX ORDER — KETOROLAC TROMETHAMINE 30 MG/ML
1 INJECTION, SOLUTION INTRAMUSCULAR; INTRAVENOUS CONTINUOUS
Qty: 1 EACH | Refills: 0 | Status: SHIPPED | OUTPATIENT
Start: 2024-08-01

## 2024-08-01 NOTE — ASSESSMENT & PLAN NOTE
She is uncontrolled but reports improvement since starting insulin therapy in hospital.    Today we discussed all aspects of diabetes including pathophysiology, risk factors, complications, SAGM, CGM, diet, lifestyle modifications, medical fitness training, diabetes education, goals of therapy, follow up needs and medications including insulin, metformin, Jardiance and GLP1 agonists.  Advised to maintain goal blood sugars 70-180mg/dL.    We agreed to add mounjaro and conitnue current Lantus 35u qAM and Humalog 4u tidac.  Start a personal cgm which is medically necessary to monitor hypoglycemia with MDI insulin therapy.  Diabetes education and fitness training.    Follow up in 2 months.    Lab Results   Component Value Date    HGBA1C 13.8 (H) 06/03/2024

## 2024-08-01 NOTE — PATIENT INSTRUCTIONS
Instructions    Diet:   Take 1500 sara/day of balanced diet with 1/3rd carbs, 1/3rd protein and rest fiber and small amount fat.   Drink at least 64 oz fluids daily.    Lifestyle:   30 min brisk activity most days. 150min-220min/week of cardiac and muscle building exercise that causes sweating.  Consider [facility] medical fitness training program.  Medical fitness: follow these exercise links  Full Version - [email or web address]     Warmup - [email or web address]     Lower Body - [email or web address]     Upper Body - [email or web address]     Core -  [email or web address]    Fasting:  Can consider after becoming regular with exercise - 14 to 24 hrs fasting 1-3 times a week.    Medications:   look up Wegovy, Zepbound, saxenda - weight loss meds.  Consider switching from medications that cause weight gain to weight neutral medications.    Other:   Make sure you are treated appropriately if you have sleep apnea.  May consider bariatric surgery if we dont see benefit over 6-12 months of all above.

## 2024-08-01 NOTE — PROGRESS NOTES
REQUIRED DOCUMENTATION:      1. This service was provided via Telemedicine -[facility]  2. Provider located at [place], [place].  3. TeleMed provider: Yaa Franklin MD.  4. Identify all parties in room with patient during tele consult:  5.Patient was then informed that this was a Telemedicine visit and that the exam was being conducted confidentially over secure lines. My office door was closed. No one else was in the room.  Patient acknowledged consent and understanding of privacy and security of the Telemedicine visit, and gave us permission to have the assistant stay in the room in order to assist with the history and to conduct the exam.  I informed the patient that I have reviewed their record in Epic and presented the opportunity for them to ask any questions regarding the visit today.  The patient agreed to participate.     Patient is aware this is a billable service.       New Consult Note      CC: T2DM    History of Present Illness:   48 yr female with T2DM since 2014, HTN, HLD,  medical non adherence, obesity BMI 39 and vit D deficiency.    SAGM: usually in 150-220mg/dL range.    Current meds:  Humalog 4u tidac  Lantus 35u QHS      Opthamology: yes  Podiatry: no  vaccination: yes  Dental:  Pancreatitis: no    Ace/ARB: lisinopril  Statin: lipitor  Thyroid issues:no      Physical Exam:  There is no height or weight on file to calculate BMI.  There were no vitals taken for this visit.   There were no vitals filed for this visit.     Physical Exam  Constitutional:       General: She is not in acute distress.     Appearance: She is well-developed.   HENT:      Head: Normocephalic and atraumatic.      Nose: Nose normal.   Eyes:      Conjunctiva/sclera: Conjunctivae normal.   Pulmonary:      Effort: Pulmonary effort is normal.   Abdominal:      General: There is no distension.   Musculoskeletal:      Cervical back: Normal range of motion and neck supple.   Skin:     Findings: No rash.      Comments: No  icterus   Neurological:      Mental Status: She is alert and oriented to person, place, and time.         Labs:   Lab Results   Component Value Date    HGBA1C 13.8 (H) 06/03/2024       Lab Results   Component Value Date    HWU5GTTNDPVL 4.826 (H) 06/03/2024       Lab Results   Component Value Date    CREATININE 0.42 (L) 06/08/2024    CREATININE 0.64 06/07/2024    CREATININE 0.70 06/06/2024    BUN 12 06/08/2024    K 3.1 (L) 06/08/2024     (H) 06/08/2024    CO2 14 (L) 06/08/2024     eGFR   Date Value Ref Range Status   06/08/2024 119 ml/min/1.73sq m Final       Lab Results   Component Value Date    ALT 20 06/03/2024    AST 17 06/03/2024    ALKPHOS 62 06/03/2024       Lab Results   Component Value Date    CHOLESTEROL 177 06/05/2024     Lab Results   Component Value Date    HDL 28 (L) 06/05/2024     Lab Results   Component Value Date    TRIG 183 (H) 06/05/2024     Lab Results   Component Value Date    NONHDLC 149 06/05/2024         Assessment/Plan:    1. Type 2 diabetes mellitus with hyperglycemia, with long-term current use of insulin ([facility])  Assessment & Plan:  She is uncontrolled but reports improvement since starting insulin therapy in hospital.    Today we discussed all aspects of diabetes including pathophysiology, risk factors, complications, SAGM, CGM, diet, lifestyle modifications, medical fitness training, diabetes education, goals of therapy, follow up needs and medications including insulin, metformin, Jardiance and GLP1 agonists.  Advised to maintain goal blood sugars 70-180mg/dL.    We agreed to add mounjaro and conitnue current Lantus 35u qAM and Humalog 4u tidac.  Start a personal cgm which is medically necessary to monitor hypoglycemia with MDI insulin therapy.  Diabetes education and fitness training.    Follow up in 2 months.    Lab Results   Component Value Date    HGBA1C 13.8 (H) 06/03/2024     Orders:  -     tirzepatide (Mounjaro) 2.5 MG/0.5ML; Inject 0.5 mL (2.5 mg total) under the skin once a  week  -     Hemoglobin A1C; Future  -     Albumin / creatinine urine ratio; Future  -     Continuous Glucose Sensor (FreeStyle Koko 3 Sensor) MISC; Use 1 Units every 14 (fourteen) days  -     Continuous Glucose  (FreeStyle Koko 3 False Pass) SUZE; Use 1 each continuous  2. Morbid obesity (HCC)  Assessment & Plan:  Today we discussed all aspects of obesity and metabolism including pathophysiology, risk factors, complications, goal of sustaining weight loss long term, usual propensity to regain weight with short term approaches, follow up needs and medications - efficacy and limitations.   Discussed role of endocrinopathies, inflammatory conditions, sleep disorders, mental health disorders, lifestyle issues and polypharmacy and weight gain.  Briefly discussed bariatric surgery.  Diet: carb controlled diet <1500cal/day/ VLCD, 64oz fluids/day   lifestyle modifications: intermittent fasting and 10,000 steps/day  medical fitness training: muscle building  education: nutrition input    Orders:  -     Ambulatory Referral to Medical Fitness Exercise Specialist; Future  3. Mixed hyperlipidemia  Assessment & Plan:  She is on statin therapy as per guidelines for diabetes.  4. Weight gain  -     T4, free; Future  -     TSH, 3rd generation; Future  -     Thyroid Antibodies Panel; Future        I have spent a total time of 44 minutes on 08/01/24 in caring for this patient including greater than 50% of this time was spent in counseling/coordination of care as listed above.       Discussed with the patient and all questioned fully answered. She will contact me with concerns.    Yaa Franklin

## 2024-08-02 ENCOUNTER — TELEPHONE (OUTPATIENT)
Age: 49
End: 2024-08-02

## 2024-08-02 NOTE — TELEPHONE ENCOUNTER
PA for Continuous Glucose  (FreeStyle [employer] [place]) SUZE  SUBMITTED     via    []CMM-KEY    [x][employer]-Case ID # [id number]  []Faxed to plan   []Other website    []Phone call Case ID #      Office notes sent, clinical questions answered. Awaiting determination    Turnaround time for your insurance to make a decision on your Prior Authorization can take 7-21 business days.

## 2024-08-04 DIAGNOSIS — Z79.4 TYPE 2 DIABETES MELLITUS WITH HYPERGLYCEMIA, WITH LONG-TERM CURRENT USE OF INSULIN (HCC): ICD-10-CM

## 2024-08-04 DIAGNOSIS — E11.65 TYPE 2 DIABETES MELLITUS WITH HYPERGLYCEMIA, WITH LONG-TERM CURRENT USE OF INSULIN (HCC): ICD-10-CM

## 2024-08-04 NOTE — TELEPHONE ENCOUNTER
PA for Continuous Glucose  (FreeStyle Koko [employer] [place]) SUZE   Denied    Reason:        Message sent to office clinical pool Yes    Denial letter scanned into Media Yes    Appeal started No ( Provider will need to decide if appeal is warranted and send clinical documentation to Prior Authorization Team for initiation.)    **Please follow up with your patient regarding denial and next steps**

## 2024-08-05 DIAGNOSIS — E11.65 TYPE 2 DIABETES MELLITUS WITH HYPERGLYCEMIA, WITH LONG-TERM CURRENT USE OF INSULIN (HCC): Primary | ICD-10-CM

## 2024-08-05 DIAGNOSIS — Z79.4 TYPE 2 DIABETES MELLITUS WITH HYPERGLYCEMIA, WITH LONG-TERM CURRENT USE OF INSULIN (HCC): Primary | ICD-10-CM

## 2024-08-05 RX ORDER — ACYCLOVIR 400 MG/1
1 TABLET ORAL
Qty: 3 EACH | Refills: 0 | Status: SHIPPED | OUTPATIENT
Start: 2024-08-05

## 2024-08-05 RX ORDER — SEMAGLUTIDE 0.68 MG/ML
INJECTION, SOLUTION SUBCUTANEOUS
Qty: 3 ML | Refills: 1 | Status: SHIPPED | OUTPATIENT
Start: 2024-08-05

## 2024-08-05 RX ORDER — ACYCLOVIR 400 MG/1
1 TABLET ORAL CONTINUOUS
Qty: 1 EACH | Refills: 0 | Status: SHIPPED | OUTPATIENT
Start: 2024-08-05

## 2024-08-26 ENCOUNTER — HOSPITAL ENCOUNTER (INPATIENT)
Facility: HOSPITAL | Age: 49
LOS: 1 days | Discharge: HOME | DRG: 065 | End: 2024-08-28
Attending: EMERGENCY MEDICINE | Admitting: INTERNAL MEDICINE
Payer: COMMERCIAL

## 2024-08-26 ENCOUNTER — APPOINTMENT (OUTPATIENT)
Dept: MRI IMAGING | Facility: HOSPITAL | Age: 49
DRG: 065 | End: 2024-08-26
Payer: COMMERCIAL

## 2024-08-26 ENCOUNTER — APPOINTMENT (OUTPATIENT)
Dept: CT IMAGING | Facility: HOSPITAL | Age: 49
DRG: 065 | End: 2024-08-26
Payer: COMMERCIAL

## 2024-08-26 DIAGNOSIS — E11.65 HYPERGLYCEMIA DUE TO DIABETES MELLITUS (HCC): ICD-10-CM

## 2024-08-26 DIAGNOSIS — I63.9 ISCHEMIC STROKE (HCC): Primary | ICD-10-CM

## 2024-08-26 DIAGNOSIS — R29.90 STROKE-LIKE SYMPTOM: ICD-10-CM

## 2024-08-26 DIAGNOSIS — R47.81 SLURRED SPEECH: ICD-10-CM

## 2024-08-26 DIAGNOSIS — D64.9 ANEMIA: ICD-10-CM

## 2024-08-26 DIAGNOSIS — R79.89 ELEVATED TROPONIN: ICD-10-CM

## 2024-08-26 LAB
ANION GAP SERPL CALCULATED.3IONS-SCNC: 7 MMOL/L (ref 4–13)
APTT PPP: 34 SECONDS (ref 23–34)
ATRIAL RATE: 101 BPM
BUN SERPL-MCNC: 18 MG/DL (ref 5–25)
CALCIUM SERPL-MCNC: 8.3 MG/DL (ref 8.4–10.2)
CARDIAC TROPONIN I PNL SERPL HS: 3 NG/L
CHLORIDE SERPL-SCNC: 104 MMOL/L (ref 96–108)
CO2 SERPL-SCNC: 24 MMOL/L (ref 21–32)
CREAT SERPL-MCNC: 0.91 MG/DL (ref 0.6–1.3)
ERYTHROCYTE [DISTWIDTH] IN BLOOD BY AUTOMATED COUNT: 16 % (ref 11.6–15.1)
FLUAV RNA RESP QL NAA+PROBE: NEGATIVE
FLUBV RNA RESP QL NAA+PROBE: NEGATIVE
GFR SERPL CREATININE-BSD FRML MDRD: 73 ML/MIN/1.73SQ M
GLUCOSE SERPL-MCNC: 181 MG/DL (ref 65–140)
GLUCOSE SERPL-MCNC: 232 MG/DL (ref 65–140)
GLUCOSE SERPL-MCNC: 243 MG/DL (ref 65–140)
HCT VFR BLD AUTO: 33.8 % (ref 34.8–46.1)
HGB BLD-MCNC: 10.3 G/DL (ref 11.5–15.4)
INR PPP: 0.97 (ref 0.85–1.19)
MCH RBC QN AUTO: 21.4 PG (ref 26.8–34.3)
MCHC RBC AUTO-ENTMCNC: 30.5 G/DL (ref 31.4–37.4)
MCV RBC AUTO: 70 FL (ref 82–98)
P AXIS: 46 DEGREES
PLATELET # BLD AUTO: 335 THOUSANDS/UL (ref 149–390)
PMV BLD AUTO: 10 FL (ref 8.9–12.7)
POTASSIUM SERPL-SCNC: 3.8 MMOL/L (ref 3.5–5.3)
PR INTERVAL: 142 MS
PROTHROMBIN TIME: 13.6 SECONDS (ref 12.3–15)
QRS AXIS: 52 DEGREES
QRSD INTERVAL: 78 MS
QT INTERVAL: 368 MS
QTC INTERVAL: 477 MS
RBC # BLD AUTO: 4.81 MILLION/UL (ref 3.81–5.12)
RSV RNA RESP QL NAA+PROBE: NEGATIVE
SARS-COV-2 RNA RESP QL NAA+PROBE: NEGATIVE
SODIUM SERPL-SCNC: 135 MMOL/L (ref 135–147)
T WAVE AXIS: 59 DEGREES
VENTRICULAR RATE: 101 BPM
WBC # BLD AUTO: 8.4 THOUSAND/UL (ref 4.31–10.16)

## 2024-08-26 PROCEDURE — 99291 CRITICAL CARE FIRST HOUR: CPT | Performed by: PSYCHIATRY & NEUROLOGY

## 2024-08-26 PROCEDURE — 85730 THROMBOPLASTIN TIME PARTIAL: CPT | Performed by: EMERGENCY MEDICINE

## 2024-08-26 PROCEDURE — 93005 ELECTROCARDIOGRAM TRACING: CPT

## 2024-08-26 PROCEDURE — 84484 ASSAY OF TROPONIN QUANT: CPT | Performed by: EMERGENCY MEDICINE

## 2024-08-26 PROCEDURE — 82948 REAGENT STRIP/BLOOD GLUCOSE: CPT

## 2024-08-26 PROCEDURE — 0241U HB NFCT DS VIR RESP RNA 4 TRGT: CPT | Performed by: EMERGENCY MEDICINE

## 2024-08-26 PROCEDURE — 85027 COMPLETE CBC AUTOMATED: CPT | Performed by: EMERGENCY MEDICINE

## 2024-08-26 PROCEDURE — 99285 EMERGENCY DEPT VISIT HI MDM: CPT | Performed by: EMERGENCY MEDICINE

## 2024-08-26 PROCEDURE — 99285 EMERGENCY DEPT VISIT HI MDM: CPT

## 2024-08-26 PROCEDURE — 36415 COLL VENOUS BLD VENIPUNCTURE: CPT | Performed by: EMERGENCY MEDICINE

## 2024-08-26 PROCEDURE — 85610 PROTHROMBIN TIME: CPT | Performed by: EMERGENCY MEDICINE

## 2024-08-26 PROCEDURE — 70498 CT ANGIOGRAPHY NECK: CPT

## 2024-08-26 PROCEDURE — 99223 1ST HOSP IP/OBS HIGH 75: CPT | Performed by: HOSPITALIST

## 2024-08-26 PROCEDURE — 70496 CT ANGIOGRAPHY HEAD: CPT

## 2024-08-26 PROCEDURE — 80048 BASIC METABOLIC PNL TOTAL CA: CPT | Performed by: EMERGENCY MEDICINE

## 2024-08-26 PROCEDURE — 70551 MRI BRAIN STEM W/O DYE: CPT

## 2024-08-26 RX ORDER — ATORVASTATIN CALCIUM 40 MG/1
40 TABLET, FILM COATED ORAL EVERY EVENING
Status: DISCONTINUED | OUTPATIENT
Start: 2024-08-26 | End: 2024-08-28 | Stop reason: HOSPADM

## 2024-08-26 RX ORDER — ATORVASTATIN CALCIUM 40 MG/1
40 TABLET, FILM COATED ORAL DAILY
Status: DISCONTINUED | OUTPATIENT
Start: 2024-08-27 | End: 2024-08-26

## 2024-08-26 RX ORDER — ACETAMINOPHEN 325 MG/1
650 TABLET ORAL EVERY 4 HOURS PRN
Status: DISCONTINUED | OUTPATIENT
Start: 2024-08-26 | End: 2024-08-28 | Stop reason: HOSPADM

## 2024-08-26 RX ORDER — CLOPIDOGREL BISULFATE 75 MG/1
75 TABLET ORAL DAILY
Status: DISCONTINUED | OUTPATIENT
Start: 2024-08-27 | End: 2024-08-28 | Stop reason: HOSPADM

## 2024-08-26 RX ORDER — ENOXAPARIN SODIUM 100 MG/ML
40 INJECTION SUBCUTANEOUS DAILY
Status: DISCONTINUED | OUTPATIENT
Start: 2024-08-27 | End: 2024-08-28 | Stop reason: HOSPADM

## 2024-08-26 RX ORDER — CLOPIDOGREL BISULFATE 75 MG/1
300 TABLET ORAL ONCE
Status: COMPLETED | OUTPATIENT
Start: 2024-08-26 | End: 2024-08-26

## 2024-08-26 RX ORDER — INSULIN GLARGINE 100 [IU]/ML
17 INJECTION, SOLUTION SUBCUTANEOUS
Status: DISCONTINUED | OUTPATIENT
Start: 2024-08-26 | End: 2024-08-28 | Stop reason: HOSPADM

## 2024-08-26 RX ORDER — INSULIN LISPRO 100 [IU]/ML
1-6 INJECTION, SOLUTION INTRAVENOUS; SUBCUTANEOUS
Status: DISCONTINUED | OUTPATIENT
Start: 2024-08-26 | End: 2024-08-26

## 2024-08-26 RX ORDER — INSULIN GLARGINE 100 [IU]/ML
35 INJECTION, SOLUTION SUBCUTANEOUS
Status: DISCONTINUED | OUTPATIENT
Start: 2024-08-26 | End: 2024-08-26

## 2024-08-26 RX ORDER — INSULIN LISPRO 100 [IU]/ML
1-6 INJECTION, SOLUTION INTRAVENOUS; SUBCUTANEOUS EVERY 6 HOURS
Status: DISCONTINUED | OUTPATIENT
Start: 2024-08-27 | End: 2024-08-27

## 2024-08-26 RX ORDER — LABETALOL HYDROCHLORIDE 5 MG/ML
10 INJECTION, SOLUTION INTRAVENOUS EVERY 6 HOURS PRN
Status: DISCONTINUED | OUTPATIENT
Start: 2024-08-26 | End: 2024-08-28 | Stop reason: HOSPADM

## 2024-08-26 RX ORDER — ASPIRIN 81 MG/1
81 TABLET, CHEWABLE ORAL DAILY
Status: DISCONTINUED | OUTPATIENT
Start: 2024-08-27 | End: 2024-08-28 | Stop reason: HOSPADM

## 2024-08-26 RX ORDER — INSULIN LISPRO 100 [IU]/ML
4 INJECTION, SOLUTION INTRAVENOUS; SUBCUTANEOUS
Status: DISCONTINUED | OUTPATIENT
Start: 2024-08-26 | End: 2024-08-26

## 2024-08-26 RX ORDER — ASPIRIN 81 MG/1
81 TABLET, CHEWABLE ORAL ONCE
Status: COMPLETED | OUTPATIENT
Start: 2024-08-26 | End: 2024-08-26

## 2024-08-26 RX ADMIN — IOHEXOL 85 ML: 350 INJECTION, SOLUTION INTRAVENOUS at 16:18

## 2024-08-26 RX ADMIN — CLOPIDOGREL BISULFATE 300 MG: 75 TABLET ORAL at 16:46

## 2024-08-26 RX ADMIN — INSULIN GLARGINE 17 UNITS: 100 INJECTION, SOLUTION SUBCUTANEOUS at 22:11

## 2024-08-26 RX ADMIN — ASPIRIN 81 MG 81 MG: 81 TABLET ORAL at 16:51

## 2024-08-26 NOTE — CASE MANAGEMENT
Case Management Assessment & Discharge Planning Note    Patient name Faye Samuels  Location ED-24/ED-24 MRN [id number]  : 1975 Date 2024       Current Admission Date: 2024  Current Admission Diagnosis:Stroke-like symptom   Patient Active Problem List    Diagnosis Date Noted Date Diagnosed    Mixed hyperlipidemia 2024     Tooth caries 2024     abnormal MRI of brain 2024     Generalized weakness 2024     Type 2 diabetes mellitus with hyperglycemia (HCC) 2024     Anion gap metabolic acidosis 2024     Elevated serum creatinine 2024     Abnormal EKG 2024     Essential hypertension 2024     Morbid obesity (HCC) 2024       LOS (days): 0  Geometric Mean LOS (GMLOS) (days):   Days to GMLOS:     OBJECTIVE:              Current admission status: Emergency       Preferred Pharmacy:   [facility]/pharmacy #[id number] - [address] [address]  Phone: [phone] Fax: [phone]    Primary Care Provider: Poonam Dunlap MD    Primary Insurance: BLUE CROSS  Secondary Insurance:     ASSESSMENT:  Active Health Care Proxies    There are no active Health Care Proxies on file.                 Readmission Root Cause  30 Day Readmission: No    Patient Information  Admitted from:: Home  Mental Status: Confused  During Assessment patient was accompanied by: Not accompanied during assessment  Assessment information provided by::  (Finance Titus over the phone)  Support Systems: Spouse/significant other, Daughter, Family members  What city do you live in?: [place]  Home entry access options. Select all that apply.: Stairs  Number of steps to enter home.: One Flight  Do the steps have railings?: Yes  Type of Current Residence: [place] home  Upon entering residence, is there a bedroom on the main floor (no further steps)?: Yes  Upon entering residence, is there a bathroom on the main floor (no further steps)?:  Yes  Living Arrangements: Lives w/ Spouse/significant other, Lives w/ Daughter, Lives w/ Extended Family  Is patient a ?: No    Activities of Daily Living Prior to Admission  Functional Status: Independent  Completes ADLs independently?: Yes (patient's fizach stated that he had to help her with dressing today)  Ambulates independently?: Yes  Does patient use assisted devices?: No  Does patient currently own DME?: No  Does patient have a history of Outpatient Therapy (PT/OT)?: No  Does the patient have a history of Short-Term Rehab?: No  Does patient have a history of HHC?: No  Does patient currently have HHC?: No         Patient Information Continued  Income Source: Employed  Does patient have prescription coverage?: Yes  Does patient receive dialysis treatments?: No  Does patient have a history of substance abuse?: No  Does patient have a history of Mental Health Diagnosis?: No         Means of Transportation  Means of Transport to Appts:: Family transport      Social Determinants of Health (SDOH)      Flowsheet Row Most Recent Value   Housing Stability    In the last 12 months, was there a time when you were not able to pay the mortgage or rent on time? N   In the past 12 months, how many times have you moved where you were living? 0   At any time in the past 12 months, were you homeless or living in a shelter (including now)? N   Transportation Needs    In the past 12 months, has lack of transportation kept you from medical appointments or from getting medications? no   In the past 12 months, has lack of transportation kept you from meetings, work, or from getting things needed for daily living? No   Food Insecurity    Within the past 12 months, you worried that your food would run out before you got the money to buy more. Never true   Within the past 12 months, the food you bought just didn't last and you didn't have money to get more. Never true   Utilities    In the past 12 months has the electric, gas,  oil, or water company threatened to shut off services in your home? No            DISCHARGE DETAILS:    Discharge planning discussed with:: patient's david Qureshi over the phone  Freedom of Choice: Yes  Comments - Freedom of Choice: CM spoke with Patient's david Qureshi over the phone, CM introduced herself, CM Role, and Discharge planning. Patient's erik Qureshi was agreeable to sending blanket referrals  CM contacted family/caregiver?: Yes  Were Treatment Team discharge recommendations reviewed with patient/caregiver?: Yes  Did patient/caregiver verbalize understanding of patient care needs?: Yes  Were patient/caregiver advised of the risks associated with not following Treatment Team discharge recommendations?: Yes    Contacts  Patient Contacts: Titus French  Relationship to Patient:: Family  Contact Method: Phone  Phone Number: [phone]  Reason/Outcome: Continuity of Care, Emergency Contact, Referral, Discharge Planning    Requested Home Health Care         Is the patient interested in HHC at discharge?: No    DME Referral Provided  Referral made for DME?: No

## 2024-08-26 NOTE — ASSESSMENT & PLAN NOTE
Risk factors of uncontrolled diabetes, hypertension, and hyperlipidemia  Initial presentation of right-sided weakness, dysarthria and dysphagia, represents left MCA territory, suspect TIA  Current presentation with an isolated dysarthria, likely from small vessel etiology localizes in the brainstem    Monitoring  - Neurochecks Q4H  - Telemetry to assess for atrial fibrillation or other dysrhythmias  - STAT CT Head for any changes in mental status or neuro exam  Diagnostic Studies:  - Labs HgbA1c, Lipid Panel, TSH, Troponin  - Consider: Free T4   - EKG  - MRI olivier w/wo contrast  - CTA head and Neck w/ contras [no LVO, no hemorrhage]  - TTE  Blood Pressure:  - Permissive HTN for 24-48 hours  - Treat BP if >220/110 mg Hg  - After permissive HTN, BP goal <140/90 mm Hg  Medication    - Aspirin 81 mg daily starting tomorrow  - Plavix 75 mg Daily  - High dose statin therapy with Atorvastatin 40 mg  - SSI for glycemic control (HgbA1c 13.8 from June/24)  Education  - Stroke education  - Weight loss education - low fat, low carbohydrate, low sodium (I.e Mediterranean or DASH) diet and daily exercise   - outpatient referral to sleep for possible QUENTIN   Rehab  - Nursing bedside dysphagia screen  - PT/OT/ST when appropriate

## 2024-08-26 NOTE — ED PROVIDER NOTES
History  Chief Complaint   Patient presents with    STROKE Alert     Pt states she took a nap at 1230, woke up and has right sided arm weakness/numbness, right sided facial droop and slurred speech. Hx TIA, LKW 1230 today 8/26.     49y.o F w/ h/o TIA presenting for stroke-like sx. Around 8am this morning, Pt fell asleep. When she woke up a few hours later she was told by her significant other that her right face was drooping and her words were slurred. Pt also noticed her words were slurring. Pt denies head strike, HA, vision changes, N/V, numbness, weakness, difficulty walking.       History provided by:  Patient      Prior to Admission Medications   Prescriptions Last Dose Informant Patient Reported? Taking?   Continuous Glucose  (Dexcom G7 ) SUZE 8/26/2024  No Yes   Sig: Use 1 each continuous   Continuous Glucose Sensor (Dexcom G7 Sensor) 8/26/2024  No Yes   Sig: Use 1 Device every 10 days   aspirin (ECOTRIN LOW STRENGTH) 81 mg EC tablet 8/26/2024  No Yes   Sig: Take 1 tablet (81 mg total) by mouth daily   atorvastatin (LIPITOR) 20 mg tablet 8/26/2024  No Yes   Sig: Take 1 tablet (20 mg total) by mouth daily   cyanocobalamin (VITAMIN B-12) 500 MCG tablet Past Week  No Yes   Sig: Take 1 tablet (500 mcg total) by mouth daily   insulin glargine (LANTUS) 100 units/mL subcutaneous injection 8/26/2024  Yes Yes   Sig: Inject 35 Units under the skin daily at bedtime   insulin lispro (HumaLOG KwikPen) 100 units/mL injection pen 8/26/2024  No Yes   Sig: Inject 3 Units under the skin 3 (three) times a day with meals   Patient taking differently: Inject 4 Units under the skin 3 (three) times a day with meals   lisinopril-hydrochlorothiazide (PRINZIDE,ZESTORETIC) 20-25 MG per tablet 8/26/2024  Yes Yes   Sig: Take 1 tablet by mouth daily   semaglutide, 0.25 or 0.5 mg/dose, (Ozempic, 0.25 or 0.5 MG/DOSE,) 2 mg/3 mL injection pen Not Taking  No No   Sig: INJECT 0.25MG ONCE WEEKLY BY SUBCUTANEOUS ROUTE FOR 4 WKS,  THEN INCREASE TO 0.5MG THEREAFTER   Patient not taking: Reported on 8/26/2024      Facility-Administered Medications: None       Past Medical History:   Diagnosis Date    Hypertension        Past Surgical History:   Procedure Laterality Date    IR LUMBAR PUNCTURE  6/7/2024       No family history on file.  I have reviewed and agree with the history as documented.    E-Cigarette/Vaping    E-Cigarette Use Never User      E-Cigarette/Vaping Substances     Social History     Tobacco Use    Smoking status: Never    Smokeless tobacco: Never   Vaping Use    Vaping status: Never Used   Substance Use Topics    Alcohol use: Not Currently    Drug use: Not Currently        Review of Systems   Constitutional:  Negative for activity change, appetite change, chills and fever.   Eyes:  Negative for photophobia and visual disturbance.   Respiratory:  Negative for shortness of breath.    Cardiovascular:  Negative for chest pain.   Gastrointestinal:  Negative for abdominal pain, nausea and vomiting.   Skin: Negative.    Neurological:  Positive for facial asymmetry and speech difficulty. Negative for weakness, light-headedness, numbness and headaches.       Physical Exam  ED Triage Vitals   Temperature Pulse Respirations Blood Pressure SpO2   08/26/24 1652 08/26/24 1613 08/26/24 1613 08/26/24 1613 08/26/24 1613   97.9 °F (36.6 °C) 103 20 (!) 179/105 97 %      Temp src Heart Rate Source Patient Position - Orthostatic VS BP Location FiO2 (%)   -- 08/26/24 1613 08/26/24 1613 08/26/24 1613 --    Monitor Sitting Right arm       Pain Score       --                    Orthostatic Vital Signs  Vitals:    08/26/24 1745 08/26/24 1800 08/26/24 1900 08/26/24 2230   BP: (!) 185/97 (!) 183/86 154/99 (!) 174/105   Pulse: 99 97 (!) 106 94   Patient Position - Orthostatic VS:    Sitting       Physical Exam  Constitutional:       General: She is not in acute distress.     Appearance: Normal appearance.   HENT:      Mouth/Throat:      Mouth: Mucous  membranes are moist.      Pharynx: Oropharynx is clear.   Eyes:      Extraocular Movements: Extraocular movements intact.      Conjunctiva/sclera: Conjunctivae normal.      Pupils: Pupils are equal, round, and reactive to light.   Cardiovascular:      Rate and Rhythm: Normal rate and regular rhythm.      Pulses: Normal pulses.      Heart sounds: Normal heart sounds.   Pulmonary:      Effort: Pulmonary effort is normal.      Breath sounds: Normal breath sounds.   Abdominal:      General: Abdomen is flat.      Palpations: Abdomen is soft.   Skin:     General: Skin is warm and dry.      Capillary Refill: Capillary refill takes less than 2 seconds.   Neurological:      Mental Status: She is alert.      Comments: Right facial droop. Dysarthria          ED Medications  Medications   cyanocobalamin (VITAMIN B-12) tablet 500 mcg (has no administration in time range)   acetaminophen (TYLENOL) tablet 650 mg (has no administration in time range)   atorvastatin (LIPITOR) tablet 40 mg (40 mg Oral Not Given 8/26/24 1842)   aspirin chewable tablet 81 mg (has no administration in time range)   enoxaparin (LOVENOX) subcutaneous injection 40 mg (has no administration in time range)   clopidogrel (PLAVIX) tablet 75 mg (has no administration in time range)   labetalol (NORMODYNE) injection 10 mg (has no administration in time range)   insulin glargine (LANTUS) subcutaneous injection 17 Units 0.17 mL (17 Units Subcutaneous Given 8/26/24 2211)   insulin lispro (HumALOG/ADMELOG) 100 units/mL subcutaneous injection 1-6 Units (has no administration in time range)   iohexol (OMNIPAQUE) 350 MG/ML injection (MULTI-DOSE) 85 mL (85 mL Intravenous Given 8/26/24 1618)   clopidogrel (PLAVIX) tablet 300 mg (300 mg Oral Given 8/26/24 1646)   aspirin chewable tablet 81 mg (81 mg Oral Given 8/26/24 1651)       Diagnostic Studies  Results Reviewed       Procedure Component Value Units Date/Time    Fingerstick Glucose (POCT) [[id number]]  (Abnormal)  Collected: 08/26/24 2200    Lab Status: Final result Specimen: Blood Updated: 08/26/24 2201     POC Glucose 181 mg/dl     Hemoglobin A1C [[id number]]     Lab Status: No result Specimen: Blood     TSH, 3rd generation with Free T4 reflex [[id number]]     Lab Status: No result Specimen: Blood     FLU/RSV/COVID - if FLU/RSV clinically relevant [[id number]]  (Normal) Collected: 08/26/24 1639    Lab Status: Final result Specimen: Nares from Nose Updated: 08/26/24 1743     SARS-CoV-2 Negative     INFLUENZA A PCR Negative     INFLUENZA B PCR Negative     RSV PCR Negative    Narrative:      This test has been performed using the CoV-2/Flu/RSV plus assay on the [employer] platform. This test has been validated by the  and verified by the performing laboratory.     This test is designed to amplify and detect the following: nucleocapsid (N), envelope (E), and RNA-dependent RNA polymerase (RdRP) genes of the SARS-CoV-2 genome; matrix (M), basic polymerase (PB2), and acidic protein (PA) segments of the influenza A genome; matrix (M) and non-structural protein (NS) segments of the influenza B genome, and the nucleocapsid genes of RSV A and RSV B.     Positive results are indicative of the presence of Flu A, Flu B, RSV, and/or SARS-CoV-2 RNA. Positive results for SARS-CoV-2 or suspected novel influenza should be reported to state, local, or federal health departments according to local reporting requirements.      All results should be assessed in conjunction with clinical presentation and other laboratory markers for clinical management.     FOR PEDIATRIC PATIENTS - copy/paste COVID Guidelines URL to browser: [email or web address]       Protime-INR [[id number]]  (Normal) Collected: 08/26/24 1639    Lab Status: Final result Specimen: Blood from Arm, Left Updated: 08/26/24 1709     Protime 13.6 seconds      INR 0.97    Narrative:      INR Therapeutic  Range    Indication                                             INR Range      Atrial Fibrillation                                               2.0-3.0  Hypercoagulable State                                    2.0.2.3  Left Ventricular Asist Device                            2.0-3.0  Mechanical Heart Valve                                  -    Aortic(with afib, MI, embolism, HF, LA enlargement,    and/or coagulopathy)                                     2.0-3.0 (2.5-3.5)     Mitral                                                             2.5-3.5  Prosthetic/Bioprosthetic Heart Valve               2.0-3.0  Venous thromboembolism (VTE: VT, PE        2.0-3.0    APTT [[id number]]  (Normal) Collected: 08/26/24 1639    Lab Status: Final result Specimen: Blood from Arm, Left Updated: 08/26/24 1709     PTT 34 seconds     HS Troponin 0hr (reflex protocol) [[id number]]  (Normal) Collected: 08/26/24 1639    Lab Status: Final result Specimen: Blood from Arm, Left Updated: 08/26/24 1709     hs TnI 0hr 3 ng/L     Basic metabolic panel [[id number]]  (Abnormal) Collected: 08/26/24 1639    Lab Status: Final result Specimen: Blood from Arm, Left Updated: 08/26/24 1702     Sodium 135 mmol/L      Potassium 3.8 mmol/L      Chloride 104 mmol/L      CO2 24 mmol/L      ANION GAP 7 mmol/L      BUN 18 mg/dL      Creatinine 0.91 mg/dL      Glucose 243 mg/dL      Calcium 8.3 mg/dL      eGFR 73 ml/min/1.73sq m     Narrative:      National Kidney Disease Foundation guidelines for Chronic Kidney Disease (CKD):     Stage 1 with normal or high GFR (GFR > 90 mL/min/1.73 square meters)    Stage 2 Mild CKD (GFR = 60-89 mL/min/1.73 square meters)    Stage 3A Moderate CKD (GFR = 45-59 mL/min/1.73 square meters)    Stage 3B Moderate CKD (GFR = 30-44 mL/min/1.73 square meters)    Stage 4 Severe CKD (GFR = 15-29 mL/min/1.73 square meters)    Stage 5 End Stage CKD (GFR <15 mL/min/1.73 square meters)  Note: GFR calculation is accurate only with a steady  state creatinine    CBC and Platelet [[id number]]  (Abnormal) Collected: 08/26/24 1639    Lab Status: Final result Specimen: Blood from Arm, Left Updated: 08/26/24 1647     WBC 8.40 Thousand/uL      RBC 4.81 Million/uL      Hemoglobin 10.3 g/dL      Hematocrit 33.8 %      MCV 70 fL      MCH 21.4 pg      MCHC 30.5 g/dL      RDW 16.0 %      Platelets 335 Thousands/uL      MPV 10.0 fL     Fingerstick Glucose (POCT) [[id number]]  (Abnormal) Collected: 08/26/24 1613    Lab Status: Final result Specimen: Blood Updated: 08/26/24 1615     POC Glucose 232 mg/dl                    MRI brain wo contrast   Final Result by Connor Askew MD (08/26 2135)      Small acute infarct within the left gangliocapsular region.   No acute hemorrhage or mass effect.   Stable nonspecific foci of T2 FLAIR signal abnormality in the bifrontal subcortical white matter.      Study was marked in EPIC for immediate notification.      Workstation performed: [id number]         CT stroke alert brain   Final Result by Kulwant Lama MD (08/26 1631)      No acute intracranial abnormality.      Findings were directly discussed with Mary Gooden  at   4:30 PM  .      Workstation performed: [id number]         CTA stroke alert (head/neck)   Final Result by Kulwant Lama MD (08/26 1631)      No hemodynamically significant stenosis, dissection or occlusion of the carotid or vertebral arteries or major vessels of the [place].               Findings were directly discussed with Mary Gooden  at   4:30 PM .      Workstation performed: [id number]               Procedures  ECG 12 Lead Documentation Only    Date/Time: 8/26/2024 4:44 PM    Performed by: Leroy Jacobsen MD  Authorized by: Leroy Jacobsen MD    ECG reviewed by me, the ED Provider: yes    Patient location:  ED  Interpretation:     Interpretation: abnormal    Rate:     ECG rate:  101    ECG rate assessment: tachycardic    Rhythm:     Rhythm: sinus rhythm    Ectopy:     Ectopy: none     QRS:     QRS axis:  Normal    QRS intervals:  Normal  Conduction:     Conduction: normal    ST segments:     ST segments:  Normal  T waves:     T waves: normal          ED Course  ED Course as of 08/27/24 0028   Mon Aug 26, 2024   1634 Discussed case with neurology who recommend plavix 300mg load. Since Pt is already on ASA no indication to load at this time. Will admit to medicine for stroke pathway.   1636 CT and CTA negative for acute pathology. Given Pt's sx started >4.5hrs ago, she is no longer a TNK candidate.                  Stroke Assessment       Row Name 08/26/24 1639             NIH Stroke Scale    Interval --      Level of Consciousness (1a.) 0      LOC Questions (1b.) 0      LOC Commands (1c.) 0      Best Gaze (2.) 0      Visual (3.) 0      Facial Palsy (4.) 1      Motor Arm, Left (5a.) 0      Motor Arm, Right (5b.) 0      Motor Leg, Left (6a.) 0      Motor Leg, Right (6b.) 0      Limb Ataxia (7.) 0      Sensory (8.) 0      Best Language (9.) 0      Dysarthria (10.) 1      Extinction and Inattention (11.) (Formerly Neglect) 0      Total 2                                        Medical Decision Making  Pre hospital stroke alert called d/t facial droop and slurred speech. Unfortunately Pt was out of the window for TNK given the onset of sx >4.5hrs. Imaging negative for acute stroke, nevertheless, Pt will need an MRI to r/o stroke. ASA and Plavix load given. Pt was admitted to medicine for stroke pathway.    Amount and/or Complexity of Data Reviewed  Labs: ordered.  Radiology: ordered.    Risk  OTC drugs.  Prescription drug management.  Decision regarding hospitalization.          Disposition  Final diagnoses:   Slurred speech   Stroke-like symptom     Time reflects when diagnosis was documented in both MDM as applicable and the Disposition within this note       Time User Action Codes Description Comment    8/26/2024  4:07 PM Siria Stone Add [R47.81] Slurred speech     8/26/2024  4:52 PM Ann-Marie  Leroy Castaneda [R29.90] Stroke-like symptom     8/26/2024  6:04 PM Colt Davis Modify [R47.81] Slurred speech     8/26/2024  6:04 PM Colt Davis Modify [R29.90] Stroke-like symptom           ED Disposition       ED Disposition   Admit    Condition   Stable    Date/Time   Mon Aug 26, 2024  4:51 PM    Comment   Case was discussed with Dr. Morillo and the patient's admission status was agreed to be Admission Status: observation status to the service of Dr. Morillo .               Follow-up Information    None         Patient's Medications   Discharge Prescriptions    No medications on file     No discharge procedures on file.    PDMP Review       None             ED Provider  Attending physically available and evaluated Faye Samuels. I managed the patient along with the ED Attending.    Electronically Signed by           Leroy Jacobsen MD  08/27/24 0028

## 2024-08-26 NOTE — ASSESSMENT & PLAN NOTE
Lab Results   Component Value Date    HGBA1C 13.8 (H) 06/03/2024       Recent Labs     08/26/24  1613   POCGLU 232*       Blood Sugar Average: Last 72 hrs:  (P) 232  Pending speech alyssa n.p.o.,  Currently on 17 units Lantus with sliding scale Q6  We will resume home regimen upon speech evaluation    home regimen   35 units Lantus nightly  4 units with meals 3 times daily

## 2024-08-26 NOTE — ASSESSMENT & PLAN NOTE
PTA lisinopril-hydrochlorothiazide (PRINZIDE,ZESTORETIC) 20-25 MG daily tablet held for permissive hypertension

## 2024-08-26 NOTE — ED ATTENDING ATTESTATION
8/26/2024  I, Placido Landin MD, saw and evaluated the patient. I have discussed the patient with the resident/non-physician practitioner and agree with the resident's/non-physician practitioner's findings, Plan of Care, and MDM as documented in the resident's/non-physician practitioner's note, except where noted. All available labs and Radiology studies were reviewed.  I was present for key portions of any procedure(s) performed by the resident/non-physician practitioner and I was immediately available to provide assistance.       At this point I agree with the current assessment done in the Emergency Department.  I have conducted an independent evaluation of this patient a history and physical is as follows:    49-year-old right-handed female brought for evaluation by EMS after noting strokelike symptoms around 3 PM with right facial droop, dysarthria, right upper extremity weakness.  Last seen normal around noon before she took a nap.  Woke up with symptoms.  History of hypertension, poorly-controlled diabetes.    On my exam here she has slight dysarthria and a very slight right facial droop with flattening of the right nasolabial fold.  She no longer has any right upper extremity weakness.  Her sensation is normal.  Her fine motor function of the right hand is at baseline.    Prehospital stroke alert had been initiated.  Evaluated by neurology team on arrival.  Fingerstick glucose with mild hyperglycemia.  CT/CTA performed.    ED Course  ED Course as of 08/26/24 1702   Mon Aug 26, 2024   1634 CT/CTA of the brain/neck negative for any acute pathology.  Not a TNK candidate due to symptom improvement and prolonged time from last seen normal.    Patient already taking daily aspirin but did not take it today.  Will load with 300 mg Plavix and admit on stroke pathway.             Critical Care Time  Procedures

## 2024-08-26 NOTE — CONSULTS
Consultation - Stroke   Faye Samuels 49 y.o. female MRN: [id number]  Unit/Bed#: ED-24 Encounter: [id number]      Assessment & Plan     Stroke-like symptoms  Assessment & Plan  48 y/o female with HTN, who presented with slurred speech and R facial droop. Stroke alert was initiated in ED. NIHSS 2. BP on presentation 179/105. Patient was deemed not a TNK candidate due to being outside of the appropriate time window. Unclear etiology at this time. Workup as noted below.    Workup:  -CT head: No acute intracranial abnormality.  -CTA head and neck: No hemodynamically significant stenosis, dissection or occlusion of the carotid or vertebral arteries or major vessels of the [place].     Plan:  - Stroke pathway  MRI brain  Echo  Lipid Panel  Hemoglobin A1c  Give Plavix 300 mg x 1; start Plavix 75 mg tomorrow AM  Continue aspirin 81 mg  Atorvastatin 40 mg  Permissive HTN, labetalol if SBP >200  Continue telemetry  PT/OT/ST  Frequent neuro checks. Continue to monitor and notify neurology with any changes.   - Medical management and supportive care per primary team. Correction of any metabolic or infectious disturbances.       Thrombolytic Decision: Patient not a candidate. Unclear time of onset outside appropriate time window.      Recommendations for outpatient neurological follow up have yet to be determined.    Case and treatment plan reviewed with attending neurologist, Dr. Gooden. Please see attending attestation for any further recommendations.    History of Present Illness     Reason for Consult / Principal Problem: Stroke-like symptoms  Patient last known well: 5:00 am  Stroke alert called: 4:10 pm  Neurology time of arrival: 4:12 pm  HPI: Faye Samuels is a 49 y.o.  female with HTN, who presents with stroke-like symptoms.    Patient seen and evaluated with attending neurologist. Patient reports she works overnight and came home. She felt normal around 5:00 am when she went to bed. She woke up at 3:00  "pm and felt like she was going to fall. She spoke with her boyfriend and he noted that she had R facial droop and slurred speech. She presented to the ED for further evaluation. Stroke alert was initiated in ED. NIHSS 2. CTH/CTA H/N without acute changes noted. Patient was deemed not a TNK candidate due to being outside of the appropriate time window. Patient reports she had a similar episode several years ago and notes that she did not go to seek medical care but her symptoms eventually resolved. Patient states she doesn't see doctors regularly. She is on aspirin at baseline.    Patient was seen previously by neurology in 06/2024 for abnormal MRI. MRI brain demonstrated \"Partially empty sella and mildly prominent bilateral optic nerve sheaths CSF spaces suggesting intracranial hypertension\". She underwent LP and had opening pressure of 23. Patient not noted to have IIH symptoms at that time therefore she was referred to follow up outpatient with neurology. It also noted that patient reported an episode of garbled speech in 04/2024 that spontaneously resolved.    Consult to Neurology  Consult performed by: BANDAR Matute  Consult ordered by: Placido Landin MD          Review of Systems  A 12 system ROS was completed. Other than the above mentioned complaints in the HPI and those commented on below, all remaining systems were negative.      Historical Information   Past Medical History:   Diagnosis Date    Hypertension      Past Surgical History:   Procedure Laterality Date    IR LUMBAR PUNCTURE  6/7/2024     Social History   Social History     Substance and Sexual Activity   Alcohol Use Not Currently     Social History     Substance and Sexual Activity   Drug Use Not Currently     E-Cigarette/Vaping    E-Cigarette Use Never User      E-Cigarette/Vaping Substances     Social History     Tobacco Use   Smoking Status Never   Smokeless Tobacco Never     Family History: No family history on file.    Review of " previous medical records was completed.     Meds/Allergies   all current active meds have been reviewed, current meds:   No current facility-administered medications for this encounter.   , and PTA meds:   Prior to Admission Medications   Prescriptions Last Dose Informant Patient Reported? Taking?   Continuous Glucose  (Dexcom G7 ) SUZE   No No   Sig: Use 1 each continuous   Continuous Glucose Sensor (Dexcom G7 Sensor)   No No   Sig: Use 1 Device every 10 days   aspirin (ECOTRIN LOW STRENGTH) 81 mg EC tablet   No No   Sig: Take 1 tablet (81 mg total) by mouth daily   atorvastatin (LIPITOR) 20 mg tablet   No No   Sig: Take 1 tablet (20 mg total) by mouth daily   cyanocobalamin (VITAMIN B-12) 500 MCG tablet   No No   Sig: Take 1 tablet (500 mcg total) by mouth daily   insulin glargine (LANTUS) 100 units/mL subcutaneous injection   Yes No   Sig: Inject 25 Units under the skin daily at bedtime   insulin lispro (HumaLOG KwikPen) 100 units/mL injection pen   No No   Sig: Inject 3 Units under the skin 3 (three) times a day with meals   lisinopril-hydrochlorothiazide (PRINZIDE,ZESTORETIC) 20-25 MG per tablet   Yes No   Sig: Take 1 tablet by mouth daily   semaglutide, 0.25 or 0.5 mg/dose, (Ozempic, 0.25 or 0.5 MG/DOSE,) 2 mg/3 mL injection pen   No No   Sig: INJECT 0.25MG ONCE WEEKLY BY SUBCUTANEOUS ROUTE FOR 4 WKS, THEN INCREASE TO 0.5MG THEREAFTER      Facility-Administered Medications: None       No Known Allergies    Objective   Vitals:Blood pressure (!) 172/102, pulse 98, temperature 97.9 °F (36.6 °C), resp. rate 18, weight 104 kg (230 lb 6.1 oz), SpO2 97%.,Body mass index is 39.54 kg/m².  No intake or output data in the 24 hours ending 08/26/24 8963    Invasive Devices:   Invasive Devices       Peripheral Intravenous Line  Duration             Peripheral IV 06/03/24 Dorsal (posterior);Right Hand 83 days    Peripheral IV 08/26/24 Left Antecubital <1 day    Peripheral IV 08/26/24 Left Antecubital <1 day                     Physical and neurologic exam performed by attending neurologist:  Physical Exam  Vitals and nursing note reviewed.   Constitutional:       General: She is not in acute distress.     Appearance: She is not ill-appearing or diaphoretic.   HENT:      Head: Normocephalic.      Mouth/Throat:      Mouth: Mucous membranes are moist.      Pharynx: Oropharynx is clear.   Eyes:      General: No scleral icterus.        Right eye: No discharge.         Left eye: No discharge.      Extraocular Movements: Extraocular movements intact and EOM normal.      Conjunctiva/sclera: Conjunctivae normal.   Cardiovascular:      Rate and Rhythm: Normal rate.   Pulmonary:      Effort: Pulmonary effort is normal. No respiratory distress.   Musculoskeletal:         General: Normal range of motion.   Skin:     General: Skin is warm and dry.      Coloration: Skin is not jaundiced or pale.   Neurological:      Mental Status: She is alert and oriented to person, place, and time.      Coordination: Finger-Nose-Finger Test and Heel to Shin Test normal.   Psychiatric:         Mood and Affect: Mood normal.       Neurologic Exam     Mental Status   Oriented to person, place, and time.   Level of consciousness: alert  Able to follow commands appropriately. No aphasia noted. Speech is mildly dysarthric.     Cranial Nerves     CN II   Right visual field deficit: none  Left visual field deficit: none     CN III, IV, VI   Extraocular motions are normal.     CN V   Facial sensation intact.     CN VIII   Hearing: intact    CN IX, X   Palate: symmetric    CN XI   CN XI normal.     CN XII   CN XII normal.   Decreased R nasolabial fold     Motor Exam   Muscle bulk: normal  Full strength throughout bilateral UE/LE     Sensory Exam   Light touch normal.   No extinction noted     Gait, Coordination, and Reflexes     Coordination   Finger to nose coordination: normal  Heel to shin coordination: normal    Tremor   Resting tremor: absent  Intention  "tremor: absent      NIHSS:  1a.Level of Consciousness: 0 = Alert   1b. LOC Questions: 0 = Answers both correctly   1c. LOC Commands: 0 = Obeys both correctly   2. Best Gaze: 0 = Normal   3. Visual: 0 = No visual field loss   4. Facial Palsy: 1=Minor paralysis (flattened nasolabial fold, asymmetric on smiling)   5a. Motor Right Arm: 0=No drift, limb holds 90 (or 45) degrees for full 10 seconds   5b. Motor Left Arm: 0=No drift, limb holds 90 (or 45) degrees for full 10 seconds   6a. Motor Right Le=No drift, limb holds 90 (or 45) degrees for full 10 seconds   6b. Motor Left Le=No drift, limb holds 90 (or 45) degrees for full 10 seconds   7. Limb Ataxia:  0=Absent   8. Sensory: 0=Normal; no sensory loss   9. Best Language:  0=No aphasia, normal   10. Dysarthria: 1=Mild to moderate, patient slurs at least some words and at worst, can be understood with some difficulty   11. Extinction and Inattention (formerly Neglect): 0=No abnormality   Total Score: 2     Time NIHSS was completed: 4:21 pm    Modified Issa Score:  1 (No significant disability. Able to carry out all usual activities, despite some symptoms)    Lab Results: I have personally reviewed pertinent reports.  , CBC:   Results from last 7 days   Lab Units 24  1639   WBC Thousand/uL 8.40   RBC Million/uL 4.81   HEMOGLOBIN g/dL 10.3*   HEMATOCRIT % 33.8*   MCV fL 70*   PLATELETS Thousands/uL 335   , BMP/CMP:   Results from last 7 days   Lab Units 24  1639   SODIUM mmol/L 135   POTASSIUM mmol/L 3.8   CHLORIDE mmol/L 104   CO2 mmol/L 24   BUN mg/dL 18   CREATININE mg/dL 0.91   CALCIUM mg/dL 8.3*   EGFR ml/min/1.73sq m 73   , Vitamin B12:   , HgBA1C:   , TSH:   , Coagulation:   , Lipid Profile:   , Ammonia:   , Urinalysis:       Invalid input(s): \"URIBILINOGEN\", Drug Screen:   , Medication Drug Levels:       Invalid input(s): \"CARBAMAZEPINE\", \"OXCARBAZEPINE\"    Imaging Studies: I have personally reviewed pertinent reports.   and I have personally " reviewed pertinent films in PACS  EKG, Pathology, and Other Studies: I have personally reviewed pertinent reports.        Code Status: Prior  Advance Directive and Living Will:      Power of :    POLST:

## 2024-08-26 NOTE — ASSESSMENT & PLAN NOTE
50 y/o female with HTN, who presented with slurred speech and R facial droop. Stroke alert was initiated in ED. NIHSS 2. BP on presentation 179/105. Patient was deemed not a TNK candidate due to being outside of the appropriate time window. Imaging with acute infarct noted. Suspect most likely small vessel etiology.    Workup:  -CT head: No acute intracranial abnormality.  -CTA head and neck: No hemodynamically significant stenosis, dissection or occlusion of the carotid or vertebral arteries or major vessels of the [place] of [place].   -MRI brain: Small acute infarct within the left gangliocapsular region. No acute hemorrhage or mass effect. Stable nonspecific foci of T2 FLAIR signal abnormality in the bifrontal subcortical white matter.  -Labs: TSH 1.735,     Plan:  - Stroke pathway  Echo  Hemoglobin A1c  Give Plavix 300 mg x 1; continue aspirin 81 mg and Plavix 75 mg x 21 days, then transition to Plavix monotherapy  Atorvastatin 40 mg  Permissive HTN, -210 x 48 hours from onset of symptoms; then goal normotension and avoid hypotension  Continue telemetry  PT/OT/ST  Frequent neuro checks. Continue to monitor and notify neurology with any changes.   - Medical management and supportive care per primary team. Correction of any metabolic or infectious disturbances.

## 2024-08-26 NOTE — LETTER
[facility]  FLOOR MED SURG UNIT  [address]  [place] [address]  Dept: [phone]    August 28, 2024     Patient: Faye Samuels   YOB: 1975   Date of Visit: 8/26/2024       To Whom it May Concern:    Faye Samuels is under my professional care. She was seen in the hospital from 8/26/2024 to 08/28/24. She may return to work on September 8th without limitations.    If you have any questions or concerns, please don't hesitate to call.         Sincerely,          Colt Davis MD

## 2024-08-26 NOTE — H&P
[facility]  H&P  Name: Faye Samuels 49 y.o. female I MRN: [id number]  Unit/Bed#: ED-24 I Date of Admission: 8/26/2024   Date of Service: 8/26/2024 I Hospital Day: 0      Assessment & Plan   * Stroke-like symptoms  Assessment & Plan  Risk factors of uncontrolled diabetes, hypertension, and hyperlipidemia  Initial presentation of right-sided weakness, dysarthria and dysphagia, represents left MCA territory, suspect TIA  Current presentation with an isolated dysarthria, likely from small vessel etiology localizes in the brainstem    Monitoring  - Neurochecks Q4H  - Telemetry to assess for atrial fibrillation or other dysrhythmias  - STAT CT Head for any changes in mental status or neuro exam  Diagnostic Studies:  - Labs HgbA1c, Lipid Panel, TSH, Troponin  - Consider: Free T4   - EKG  - MRI olivier w/wo contrast  - CTA head and Neck w/ contras [no LVO, no hemorrhage]  - TTE  Blood Pressure:  - Permissive HTN for 24-48 hours  - Treat BP if >220/110 mg Hg  - After permissive HTN, BP goal <140/90 mm Hg  Medication    - Aspirin 81 mg daily starting tomorrow  - Plavix 75 mg Daily  - High dose statin therapy with Atorvastatin 40 mg  - SSI for glycemic control (HgbA1c 13.8 from June/24)  Education  - Stroke education  - Weight loss education - low fat, low carbohydrate, low sodium (I.e Mediterranean or DASH) diet and daily exercise   - outpatient referral to sleep for possible QUENTIN   Rehab  - Nursing bedside dysphagia screen  - PT/OT/ST when appropriate       Essential hypertension  Assessment & Plan  PTA lisinopril-hydrochlorothiazide (PRINZIDE,ZESTORETIC) 20-25 MG daily tablet held for permissive hypertension    Type 2 diabetes mellitus with hyperglycemia (HCC)  Assessment & Plan  Lab Results   Component Value Date    HGBA1C 13.8 (H) 06/03/2024       Recent Labs     08/26/24  1613   POCGLU 232*       Blood Sugar Average: Last 72 hrs:  (P) 232  Pending speech alyssa n.p.o.,  Currently on 17 units  Lantus with sliding scale Q6  We will resume home regimen upon speech evaluation    home regimen   35 units Lantus nightly  4 units with meals 3 times daily            VTE Pharmacologic Prophylaxis: VTE Score: 10 High Risk (Score >/= 5) - Pharmacological DVT Prophylaxis Ordered: enoxaparin (Lovenox). Sequential Compression Devices Ordered.  Code Status: Level 1 - Full Code   Discussion with family: Significant other stated at bedside    Anticipated Length of Stay: To be determined    Chief Complaint: Strokelike symptoms    History of Present Illness:  Faye Samuels is a 49 y.o. female with a PMH of uncontrolled diabetes A1c 13.8, hypertension, hyperlipidemia who presents with dysarthria.    Onset of early this afternoon, patient woke to her  noticing significant right-sided facial droop and dysarthria.  She was also experiencing right-sided weakness, more pronounced in the right upper arm and legs.  Brought to the ED for evaluation and concern for strokelike symptoms.  Upon arrival, her right-sided weakness resolved, with her first initial evaluation, received a stroke scale score of 2 points for dysarthria and a mild right-sided facial droop.  Workup including CTA CT head, demonstrate no LVO or hemorrhage.  She was loaded with aspirin and Plavix, and underwent stroke pathway.    Seen seen and evaluated by me around 5 PM, patient complains of changing in her voice, however she denies any changes in hearing, swallowing difficulties, and she denies any balance and coordination issues.  She denies any asymmetry in strength and sensations in bilateral extremities.    Her past medical history is significant for uncontrolled diabetes with recent A1c at 13.8.    She was recently seen in [facility], for type 2 diabetes with an anion gap metabolic acidosis, which subsequently placed on insulin drip.  Her brain MRI demonstrated nonspecific pattern for possible intracranial hypertension, but subsequently was ruled out.      She also endorses having a strokelike symptoms in April 2024,.  She presented with slurred speech which self resolved, and thus she did not seek evaluation to the emergency room.    Family history is significant for cardiovascular disease, including CVA in parents.     Social history, she is a non-smoker, does not drink alcohol.       Review of Systems:      Past Medical and Surgical History:   Past Medical History:   Diagnosis Date    Hypertension        Past Surgical History:   Procedure Laterality Date    IR LUMBAR PUNCTURE  6/7/2024       Meds/Allergies:  Prior to Admission medications    Medication Sig Start Date End Date Taking? Authorizing Provider   aspirin (ECOTRIN LOW STRENGTH) 81 mg EC tablet Take 1 tablet (81 mg total) by mouth daily 6/9/24  Yes Kitty Salinas MD   atorvastatin (LIPITOR) 20 mg tablet Take 1 tablet (20 mg total) by mouth daily 6/8/24  Yes Kitty Salinas MD   Continuous Glucose  (Dexcom G7 ) SUZE Use 1 each continuous 8/5/24  Yes Yaa Franklin MD   Continuous Glucose Sensor (Dexcom G7 Sensor) Use 1 Device every 10 days 8/5/24  Yes Yaa Franklin MD   cyanocobalamin (VITAMIN B-12) 500 MCG tablet Take 1 tablet (500 mcg total) by mouth daily 6/9/24  Yes Kitty Salinas MD   insulin glargine (LANTUS) 100 units/mL subcutaneous injection Inject 35 Units under the skin daily at bedtime   Yes Historical Provider, MD   insulin lispro (HumaLOG KwikPen) 100 units/mL injection pen Inject 3 Units under the skin 3 (three) times a day with meals  Patient taking differently: Inject 4 Units under the skin 3 (three) times a day with meals 6/10/24  Yes Benny Diaz   lisinopril-hydrochlorothiazide (PRINZIDE,ZESTORETIC) 20-25 MG per tablet Take 1 tablet by mouth daily   Yes Historical Provider, MD   semaglutide, 0.25 or 0.5 mg/dose, (Ozempic, 0.25 or 0.5 MG/DOSE,) 2 mg/3 mL injection pen INJECT 0.25MG ONCE WEEKLY BY SUBCUTANEOUS ROUTE  FOR 4 WKS, THEN INCREASE TO 0.5MG THEREAFTER  Patient not taking: Reported on 8/26/2024 8/5/24   Yaa Franklin MD     I have reviewed home medications with patient personally.    Allergies: No Known Allergies    Social History:  Marital Status: Single   Occupation:   Patient Pre-hospital Living Situation: Home  Patient Pre-hospital Level of Mobility: walks  Patient Pre-hospital Diet Restrictions: None  Substance Use History:   Social History     Substance and Sexual Activity   Alcohol Use Not Currently     Social History     Tobacco Use   Smoking Status Never   Smokeless Tobacco Never     Social History     Substance and Sexual Activity   Drug Use Not Currently       Family History:  No family history on file.    Physical Exam:     Vitals:   Blood Pressure: 154/99 (08/26/24 1900)  Pulse: (!) 106 (08/26/24 1900)  Temperature: 97.9 °F (36.6 °C) (08/26/24 1652)  Respirations: 18 (08/26/24 1900)  Weight - Scale: 104 kg (230 lb 6.1 oz) (08/26/24 1625)  SpO2: 99 % (08/26/24 1900)    Physical Exam  Constitutional:       Comments: Comfortable in bed, alert and awake and oriented x 4   Cardiovascular:      Comments: Regular rhythm, normal rate  Pulmonary:      Comments: Good air movement, breath sounds normal  Abdominal:      Comments: Soft, and nontender   Skin:     Comments: Warm   Neurological:      Comments: Face symmetric,  No facial asymmetry  No deviation noted in tongue and uvula  Sensation to touch symmetric in face  Hearing symmetric bilaterally  Rest unremarkable,    No deficits in coordination, strength and sensation to touch in extremities  Reflexes brisk in patellar and brachioradialis          Additional Data:     Lab Results:  Results from last 7 days   Lab Units 08/26/24  1639   WBC Thousand/uL 8.40   HEMOGLOBIN g/dL 10.3*   HEMATOCRIT % 33.8*   PLATELETS Thousands/uL 335     Results from last 7 days   Lab Units 08/26/24  1639   SODIUM mmol/L 135   POTASSIUM mmol/L 3.8   CHLORIDE mmol/L 104   CO2  mmol/L 24   BUN mg/dL 18   CREATININE mg/dL 0.91   ANION GAP mmol/L 7   CALCIUM mg/dL 8.3*   GLUCOSE RANDOM mg/dL 243*     Results from last 7 days   Lab Units 08/26/24  1639   INR  0.97     Results from last 7 days   Lab Units 08/26/24  1613   POC GLUCOSE mg/dl 232*     Lab Results   Component Value Date    HGBA1C 13.8 (H) 06/03/2024           Lines/Drains:  Invasive Devices       Peripheral Intravenous Line  Duration             Peripheral IV 06/03/24 Dorsal (posterior);Right Hand 84 days    Peripheral IV 08/26/24 Left Antecubital <1 day    Peripheral IV 08/26/24 Left Antecubital <1 day                        Imaging: Reviewed radiology reports from this admission including: CT head  CT stroke alert brain   Final Result by Kulwant Lama MD (08/26 1631)      No acute intracranial abnormality.      Findings were directly discussed with Mary Gooden  at   4:30 PM  .      Workstation performed: [id number]         CTA stroke alert (head/neck)   Final Result by Kulwant Lama MD (08/26 1631)      No hemodynamically significant stenosis, dissection or occlusion of the carotid or vertebral arteries or major vessels of the [place].               Findings were directly discussed with Mary Gooden  at   4:30 PM .      Workstation performed: [id number]         MRI Inpatient Order    (Results Pending)       EKG and Other Studies Reviewed on Admission:   EKG: Personally Reviewed.    ** Please Note: This note has been constructed using a voice recognition system. **

## 2024-08-27 PROBLEM — I63.9 STROKE (CEREBRUM) (HCC): Status: ACTIVE | Noted: 2024-08-26

## 2024-08-27 PROBLEM — E87.8 ELECTROLYTE ABNORMALITY: Status: ACTIVE | Noted: 2024-08-27

## 2024-08-27 PROBLEM — D64.9 ANEMIA: Status: ACTIVE | Noted: 2024-08-27

## 2024-08-27 LAB
ANION GAP SERPL CALCULATED.3IONS-SCNC: 6 MMOL/L (ref 4–13)
BUN SERPL-MCNC: 11 MG/DL (ref 5–25)
CALCIUM SERPL-MCNC: 8.6 MG/DL (ref 8.4–10.2)
CHLORIDE SERPL-SCNC: 105 MMOL/L (ref 96–108)
CHOLEST SERPL-MCNC: 183 MG/DL
CO2 SERPL-SCNC: 27 MMOL/L (ref 21–32)
CREAT SERPL-MCNC: 0.65 MG/DL (ref 0.6–1.3)
ERYTHROCYTE [DISTWIDTH] IN BLOOD BY AUTOMATED COUNT: 16.2 % (ref 11.6–15.1)
EST. AVERAGE GLUCOSE BLD GHB EST-MCNC: 214 MG/DL
GFR SERPL CREATININE-BSD FRML MDRD: 103 ML/MIN/1.73SQ M
GLUCOSE P FAST SERPL-MCNC: 123 MG/DL (ref 65–99)
GLUCOSE SERPL-MCNC: 123 MG/DL (ref 65–140)
GLUCOSE SERPL-MCNC: 137 MG/DL (ref 65–140)
GLUCOSE SERPL-MCNC: 159 MG/DL (ref 65–140)
GLUCOSE SERPL-MCNC: 194 MG/DL (ref 65–140)
GLUCOSE SERPL-MCNC: 202 MG/DL (ref 65–140)
HBA1C MFR BLD: 9.1 %
HCT VFR BLD AUTO: 35.4 % (ref 34.8–46.1)
HDLC SERPL-MCNC: 48 MG/DL
HGB BLD-MCNC: 10.7 G/DL (ref 11.5–15.4)
LDLC SERPL CALC-MCNC: 109 MG/DL (ref 0–100)
MAGNESIUM SERPL-MCNC: 1.8 MG/DL (ref 1.9–2.7)
MCH RBC QN AUTO: 21.5 PG (ref 26.8–34.3)
MCHC RBC AUTO-ENTMCNC: 30.2 G/DL (ref 31.4–37.4)
MCV RBC AUTO: 71 FL (ref 82–98)
PHOSPHATE SERPL-MCNC: 2.9 MG/DL (ref 2.7–4.5)
PLATELET # BLD AUTO: 361 THOUSANDS/UL (ref 149–390)
PMV BLD AUTO: 9.8 FL (ref 8.9–12.7)
POTASSIUM SERPL-SCNC: 3.6 MMOL/L (ref 3.5–5.3)
RBC # BLD AUTO: 4.97 MILLION/UL (ref 3.81–5.12)
SODIUM SERPL-SCNC: 138 MMOL/L (ref 135–147)
TRIGL SERPL-MCNC: 131 MG/DL
TSH SERPL DL<=0.05 MIU/L-ACNC: 1.74 UIU/ML (ref 0.45–4.5)
WBC # BLD AUTO: 8.88 THOUSAND/UL (ref 4.31–10.16)

## 2024-08-27 PROCEDURE — 84100 ASSAY OF PHOSPHORUS: CPT

## 2024-08-27 PROCEDURE — 80048 BASIC METABOLIC PNL TOTAL CA: CPT

## 2024-08-27 PROCEDURE — 82948 REAGENT STRIP/BLOOD GLUCOSE: CPT

## 2024-08-27 PROCEDURE — 80061 LIPID PANEL: CPT

## 2024-08-27 PROCEDURE — 99232 SBSQ HOSP IP/OBS MODERATE 35: CPT | Performed by: PSYCHIATRY & NEUROLOGY

## 2024-08-27 PROCEDURE — 83735 ASSAY OF MAGNESIUM: CPT

## 2024-08-27 PROCEDURE — 83036 HEMOGLOBIN GLYCOSYLATED A1C: CPT

## 2024-08-27 PROCEDURE — 99232 SBSQ HOSP IP/OBS MODERATE 35: CPT | Performed by: INTERNAL MEDICINE

## 2024-08-27 PROCEDURE — 97161 PT EVAL LOW COMPLEX 20 MIN: CPT

## 2024-08-27 PROCEDURE — 85027 COMPLETE CBC AUTOMATED: CPT

## 2024-08-27 PROCEDURE — 36415 COLL VENOUS BLD VENIPUNCTURE: CPT

## 2024-08-27 PROCEDURE — 84443 ASSAY THYROID STIM HORMONE: CPT

## 2024-08-27 PROCEDURE — 92610 EVALUATE SWALLOWING FUNCTION: CPT

## 2024-08-27 RX ORDER — MAGNESIUM SULFATE HEPTAHYDRATE 40 MG/ML
2 INJECTION, SOLUTION INTRAVENOUS ONCE
Status: COMPLETED | OUTPATIENT
Start: 2024-08-27 | End: 2024-08-27

## 2024-08-27 RX ORDER — FERROUS SULFATE 325(65) MG
325 TABLET ORAL
Status: DISCONTINUED | OUTPATIENT
Start: 2024-08-28 | End: 2024-08-28 | Stop reason: HOSPADM

## 2024-08-27 RX ORDER — INSULIN LISPRO 100 [IU]/ML
1-6 INJECTION, SOLUTION INTRAVENOUS; SUBCUTANEOUS
Status: DISCONTINUED | OUTPATIENT
Start: 2024-08-27 | End: 2024-08-28 | Stop reason: HOSPADM

## 2024-08-27 RX ADMIN — ENOXAPARIN SODIUM 40 MG: 40 INJECTION SUBCUTANEOUS at 09:17

## 2024-08-27 RX ADMIN — MAGNESIUM SULFATE HEPTAHYDRATE 2 G: 40 INJECTION, SOLUTION INTRAVENOUS at 06:29

## 2024-08-27 RX ADMIN — INSULIN LISPRO 2 UNITS: 100 INJECTION, SOLUTION INTRAVENOUS; SUBCUTANEOUS at 21:58

## 2024-08-27 RX ADMIN — ATORVASTATIN CALCIUM 40 MG: 40 TABLET, FILM COATED ORAL at 18:19

## 2024-08-27 RX ADMIN — INSULIN LISPRO 2 UNITS: 100 INJECTION, SOLUTION INTRAVENOUS; SUBCUTANEOUS at 13:09

## 2024-08-27 RX ADMIN — INSULIN LISPRO 1 UNITS: 100 INJECTION, SOLUTION INTRAVENOUS; SUBCUTANEOUS at 01:20

## 2024-08-27 RX ADMIN — INSULIN GLARGINE 17 UNITS: 100 INJECTION, SOLUTION SUBCUTANEOUS at 21:58

## 2024-08-27 NOTE — ASSESSMENT & PLAN NOTE
Presented with microcytic anemia, hemoglobin 10.7 with MCV 71  Iron panel was recently done on June 2024 with normal TIBC and low iron level and iron saturation level just above possible iron deficiency anemia  Would recommend patient to be started on iron supplement every other day.

## 2024-08-27 NOTE — PLAN OF CARE
Problem: Potential for Falls  Goal: Patient will remain free of falls  Description: INTERVENTIONS:  - Educate patient/family on patient safety including physical limitations  - Instruct patient to call for assistance with activity   - Consult OT/PT to assist with strengthening/mobility   - Keep Call bell within reach  - Keep bed low and locked with side rails adjusted as appropriate  - Keep care items and personal belongings within reach  - Initiate and maintain comfort rounds  - Make Fall Risk Sign visible to staff  - Offer Toileting every 2 Hours, in advance of need  - Initiate/Maintain bed alarm  - Obtain necessary fall risk management equipment  - Apply yellow socks and bracelet for high fall risk patients  - Consider moving patient to room near nurses station  Outcome: Progressing     Problem: NEUROSENSORY - ADULT  Goal: Achieves stable or improved neurological status  Description: INTERVENTIONS  - Monitor and report changes in neurological status  - Monitor vital signs such as temperature, blood pressure, glucose, and any other labs ordered   - Initiate measures to prevent increased intracranial pressure  - Monitor for seizure activity and implement precautions if appropriate      Outcome: Progressing  Goal: Achieves maximal functionality and self care  Description: INTERVENTIONS  - Monitor swallowing and airway patency with patient fatigue and changes in neurological status  - Encourage and assist patient to increase activity and self care.   - Encourage visually impaired, hearing impaired and aphasic patients to use assistive/communication devices  Outcome: Progressing     Problem: CARDIOVASCULAR - ADULT  Goal: Maintains optimal cardiac output and hemodynamic stability  Description: INTERVENTIONS:  - Monitor I/O, vital signs and rhythm  - Monitor for S/S and trends of decreased cardiac output  - Administer and titrate ordered vasoactive medications to optimize hemodynamic stability  - Assess quality of  pulses, skin color and temperature  - Assess for signs of decreased coronary artery perfusion  - Instruct patient to report change in severity of symptoms  Outcome: Progressing  Goal: Absence of cardiac dysrhythmias or at baseline rhythm  Description: INTERVENTIONS:  - Continuous cardiac monitoring, vital signs, obtain 12 lead EKG if ordered  - Administer antiarrhythmic and heart rate control medications as ordered  - Monitor electrolytes and administer replacement therapy as ordered  Outcome: Progressing     Problem: Neurological Deficit  Goal: Neurological status is stable or improving  Description: Interventions:  - Monitor and assess patient's level of consciousness, motor function, sensory function, and level of assistance needed for ADLs.   - Monitor and report changes from baseline. Collaborate with interdisciplinary team to initiate plan and implement interventions as ordered.   - Provide and maintain a safe environment.  - Consider seizure precautions.  - Consider fall precautions.  - Consider aspiration precautions.  - Consider bleeding precautions.  Outcome: Progressing     Problem: Activity Intolerance/Impaired Mobility  Goal: Mobility/activity is maintained at optimum level for patient  Description: Interventions:  - Assess and monitor patient  barriers to mobility and need for assistive/adaptive devices.  - Assess patient's emotional response to limitations.  - Collaborate with interdisciplinary team and initiate plans and interventions as ordered.  - Encourage independent activity per ability.  - Maintain proper body alignment.  - Perform active/passive rom as tolerated/ordered.  - Plan activities to conserve energy.  - Turn patient as appropriate  Outcome: Progressing     Problem: Communication Impairment  Goal: Ability to express needs and understand communication  Description: Assess patient's communication skills and ability to understand information.  Patient will demonstrate use of effective  communication techniques, alternative methods of communication and understanding even if not able to speak.     - Encourage communication and provide alternate methods of communication as needed.  - Collaborate with case management/ for discharge needs.  - Include patient/family/caregiver in decisions related to communication.  Outcome: Progressing     Problem: Potential for Aspiration  Goal: Non-ventilated patient's risk of aspiration is minimized  Description: Assess and monitor vital signs, respiratory status, and labs (WBC).  Monitor for signs of aspiration (tachypnea, cough, rales, wheezing, cyanosis, fever).    - Assess and monitor patient's ability to swallow.  - Place patient up in chair to eat if possible.  - HOB up at 90 degrees to eat if unable to get patient up into chair.  - Supervise patient during oral intake.   - Instruct patient/ family to take small bites.  - Instruct patient/ family to take small single sips when taking liquids.  - Follow patient-specific strategies generated by speech pathologist.  Outcome: Progressing  Goal: Ventilated patient's risk of aspiration is minimized  Description: Assess and monitor vital signs, respiratory status, airway cuff pressure, and labs (WBC).  Monitor for signs of aspiration (tachypnea, cough, rales, wheezing, cyanosis, fever).    - Elevate head of bed 30 degrees if patient has tube feeding.  - Monitor tube feeding.  Outcome: Progressing     Problem: Nutrition  Goal: Nutrition/Hydration status is improving  Description: Monitor and assess patient's nutrition/hydration status for malnutrition (ex- brittle hair, bruises, dry skin, pale skin and conjunctiva, muscle wasting, smooth red tongue, and disorientation). Collaborate with interdisciplinary team and initiate plan and interventions as ordered.  Monitor patient's weight and dietary intake as ordered or per policy. Utilize nutrition screening tool and intervene per policy. Determine patient's  food preferences and provide high-protein, high-caloric foods as appropriate.     - Assist patient with eating.  - Allow adequate time for meals.  - Encourage patient to take dietary supplement as ordered.  - Collaborate with clinical nutritionist.  - Include patient/family/caregiver in decisions related to nutrition.  Outcome: Progressing

## 2024-08-27 NOTE — ASSESSMENT & PLAN NOTE
Risk factors of uncontrolled diabetes, hypertension, and hyperlipidemia  Initial presentation of right-sided weakness, dysarthria and dysphagia, represents left MCA territory, suspect TIA  Current presentation with an isolated dysarthria, likely from small vessel etiology localizes in the brainstem  MRI brain: Small acute infarct within the left gangliocapsular region.     Monitoring  - Neurochecks Q4H  - Telemetry to assess for atrial fibrillation or other dysrhythmias  - STAT CT Head for any changes in mental status or neuro exam  Diagnostic Studies:  - Labs HgbA1c 9.1, Lipid Panel , HDL 49, TSH wnl, Troponin  - EKG  - CTA head and Neck w/ contras [no LVO, no hemorrhage]  - TTE  Blood Pressure:  - Permissive HTN for 24-48 hours  - Treat BP if >220/110 mg Hg  - After permissive HTN, BP goal <140/90 mm Hg  Medication    - Aspirin 81 mg daily starting tomorrow  - Plavix 75 mg Daily  - High dose statin therapy with Atorvastatin 40 mg  - SSI for glycemic control (HgbA1c 13.8 from June/24)  Education  - Stroke education  - Weight loss education - low fat, low carbohydrate, low sodium (I.e Mediterranean or DASH) diet and daily exercise   - outpatient referral to sleep for possible QUENTIN   Rehab  - Nursing bedside dysphagia screen  - PT/OT/ST - PR home recommended

## 2024-08-27 NOTE — ASSESSMENT & PLAN NOTE
Lab Results   Component Value Date    HGBA1C 13.8 (H) 06/03/2024       Recent Labs     08/26/24  1613 08/26/24  2200 08/27/24  0120   POCGLU 232* 181* 159*       Blood Sugar Average: Last 72 hrs:  (P) [id number]  home regimen: 35 units Lantus nightly,4 units with meals 3 times daily    Pending speech alyssa n.p.o.,  Currently on 17 units Lantus with sliding scale Q6  We will resume home regimen upon speech evaluation

## 2024-08-27 NOTE — UTILIZATION REVIEW
Initial Clinical Review  Observation on 08/26 @ 1653 upgraded to Inpatient on 08/27 @ 1442. Pt requiring continued stay d/t continued stroke w/u.    Admission: Date/Time/Statement:   Admission Orders (From admission, onward)       Ordered        08/27/24 1442  INPATIENT ADMISSION  Once            08/26/24 1653  Place in Observation  Once                          Orders Placed This Encounter   Procedures    INPATIENT ADMISSION     Standing Status:   Standing     Number of Occurrences:   1     Order Specific Question:   Level of Care     Answer:   Med Surg [16]     Order Specific Question:   Estimated length of stay     Answer:   Not Applicable     ED Arrival Information       Expected   -    Arrival   8/26/2024 16:12    Acuity   Emergent              Means of arrival   Ambulance    Escorted by   [facility]    Service   Hospitalist    Admission type   Emergency              Arrival complaint   -             Chief Complaint   Patient presents with    STROKE Alert     Pt states she took a nap at 1230, woke up and has right sided arm weakness/numbness, right sided facial droop and slurred speech. Hx TIA, LKW 1230 today 8/26.       Initial Presentation: 49 y.o. female  with a PMH of uncontrolled diabetes A1c 13.8, hypertension, hyperlipidemia presented to the ED from home via EMS w/ stroke like symptoms.   Pt woke up around 3 PM today and then felt unsteady on her feet. She spoke to her boyfriend who noted some dysarthria and right-sided facial droop. She had a similar episode a few months ago but as symptoms resolved spontaneously she did not seek care.   In the ED, /105.  Initial NIH score of 2. deemed not to be a candidate for lytics due to last known well outside of window.    CTA CT head, demonstrate no LVO or hemorrhage.  She was loaded with aspirin and Plavix, and underwent stroke pathway.     Admit as observation level of care for stroke like symptoms, hypertension.  Plan: Neurochecks Q4H. Telemetry.   HgbA1c, Lipid Panel, TSH, Troponin. Consider: Free T4. EKG. MRI olivier. CTA head and Neck.. TTE.  Permissive HTN for 24-48 hours. Treat BP if >220/110 mg Hg. Aspirin 81 mg daily starting tomorrow. Plavix 75 mg Daily. High dose statin therapy.       08/27 Obs to IP  IM Notes: Dx Stroke :Pt reports mild residual dysarthria early this am however it is improved this afternoon. MRI of the brain with acute left ganglial capsular ischemic infarct. Echocardiogram pending. continue with aspirin 81 mg once a day and Plavix 75 mg once a day for 21 days, then continue Plavix 75 mg once a day. continue with atorvastatin 40 mg once a day. Continue with permissive hypertension until approximately 5 AM tomorrow which will be 48 hours, since patient was last known well. Then after this, goal is normotension.       ED Triage Vitals   Temperature Pulse Respirations Blood Pressure SpO2 Pain Score   08/26/24 1652 08/26/24 1613 08/26/24 1613 08/26/24 1613 08/26/24 1613 08/27/24 0500   97.9 °F (36.6 °C) 103 20 (!) 179/105 97 % No Pain     Weight (last 2 days)       Date/Time Weight    08/27/24 1702 103 (226.19)    08/26/24 1625 104 (230.38)            Vital Signs (last 3 days)       Date/Time Temp Pulse Resp BP MAP (mmHg) SpO2 O2 Device Patient Position - Orthostatic VS [place] Coma Scale Score Pain    08/27/24 1721 97.8 °F (36.6 °C) -- 18 -- -- 98 % None (Room air) Lying 15 No Pain    08/27/24 17:16:56 97.8 °F (36.6 °C) 86 -- 149/84 106 93 % -- -- -- --    08/27/24 1702 -- -- -- -- -- -- -- -- -- No Pain    08/27/24 1608 -- 81 20 135/79 100 98 % None (Room air) -- -- --    08/27/24 1129 -- 99 19 169/77 -- 95 % None (Room air) Sitting -- --    08/27/24 0942 -- -- -- -- -- -- -- -- -- No Pain    08/27/24 0503 -- 88 -- -- -- -- -- -- -- --    08/27/24 0500 -- 89 18 171/88 122 96 % None (Room air) -- 15 No Pain    08/27/24 0300 -- -- -- -- -- -- -- -- 15 --    08/27/24 0100 -- 85 -- 137/65 93 -- -- -- 15 --    08/27/24 0000 -- 90 -- 158/78  109 -- -- -- -- --    08/26/24 2300 -- 96 -- 183/99 132 -- -- -- 15 --    08/26/24 2230 -- 94 16 174/105 135 98 % None (Room air) Sitting -- --    08/26/24 1900 -- 106 18 154/99 123 99 % -- -- 15 --    08/26/24 1847 -- -- -- -- -- -- -- -- 15 --    08/26/24 1830 -- -- -- -- -- -- -- -- 15 --    08/26/24 1800 -- 97 18 183/86 123 98 % -- -- 15 --    08/26/24 1745 -- 99 18 185/97 134 99 % -- -- -- --    08/26/24 1730 -- -- -- -- -- -- -- -- 15 --    08/26/24 1700 -- -- -- -- -- -- -- -- 15 --    08/26/24 1652 97.9 °F (36.6 °C) -- -- -- -- -- -- -- -- --    08/26/24 1645 -- 98 18 172/102 -- 97 % -- -- 15 --    08/26/24 1630 -- 107 18 165/89 -- 98 % -- -- 15 --    08/26/24 16:25:51 -- 106 18 168/87 120 99 % -- -- -- --    08/26/24 1625 -- 106 18 168/87 -- 97 % -- -- 15 --    08/26/24 1613 -- 103 20 179/105 -- 97 % None (Room air) Sitting -- --              Pertinent Labs/Diagnostic Test Results:   Radiology:  MRI brain wo contrast   Final Interpretation by Connor Askew MD (08/26 2135)      Small acute infarct within the left gangliocapsular region.   No acute hemorrhage or mass effect.   Stable nonspecific foci of T2 FLAIR signal abnormality in the bifrontal subcortical white matter.      Study was marked in EPIC for immediate notification.      Workstation performed: [id number]         CT stroke alert brain   Final Interpretation by Kulwant Lama MD (08/26 1631)      No acute intracranial abnormality.      Findings were directly discussed with Mary Gooden  at   4:30 PM  .      Workstation performed: [id number]         CTA stroke alert (head/neck)   Final Interpretation by Kulwant Lama MD (08/26 1631)      No hemodynamically significant stenosis, dissection or occlusion of the carotid or vertebral arteries or major vessels of the [place].               Findings were directly discussed with Mary Gooden  at   4:30 PM .      Workstation performed: [id number]           Cardiology:  No orders  to display     GI:  No orders to display       Results from last 7 days   Lab Units 08/26/24  1639   SARS-COV-2  Negative     Results from last 7 days   Lab Units 08/27/24  0504 08/26/24  1639   WBC Thousand/uL 8.88 8.40   HEMOGLOBIN g/dL 10.7* 10.3*   HEMATOCRIT % 35.4 33.8*   PLATELETS Thousands/uL 361 335         Results from last 7 days   Lab Units 08/27/24  0504 08/26/24  1639   SODIUM mmol/L 138 135   POTASSIUM mmol/L 3.6 3.8   CHLORIDE mmol/L 105 104   CO2 mmol/L 27 24   ANION GAP mmol/L 6 7   BUN mg/dL 11 18   CREATININE mg/dL 0.65 0.91   EGFR ml/min/1.73sq m 103 73   CALCIUM mg/dL 8.6 8.3*   MAGNESIUM mg/dL 1.8*  --    PHOSPHORUS mg/dL 2.9  --          Results from last 7 days   Lab Units 08/27/24  1252 08/27/24  0731 08/27/24  0120 08/26/24  2200 08/26/24  1613   POC GLUCOSE mg/dl 202* 137 159* 181* 232*     Results from last 7 days   Lab Units 08/27/24  0504 08/26/24  1639   GLUCOSE RANDOM mg/dL 123 243*         Results from last 7 days   Lab Units 08/27/24  0504   HEMOGLOBIN A1C % 9.1*   EAG mg/dl 214     Beta- Hydroxybutyrate   Date Value Ref Range Status   06/03/2024 0.58 (H) 0.02 - 0.27 mmol/L Final                      Results from last 7 days   Lab Units 08/26/24  1639   HS TNI 0HR ng/L 3         Results from last 7 days   Lab Units 08/26/24  1639   PROTIME seconds 13.6   INR  0.97   PTT seconds 34     Results from last 7 days   Lab Units 08/27/24  0504   TSH 3RD GENERATON uIU/mL 1.735                                                             Results from last 7 days   Lab Units 08/26/24  1639   INFLUENZA A PCR  Negative   INFLUENZA B PCR  Negative   RSV PCR  Negative                                               ED Treatment-Medication Administration from 08/26/2024 1605 to 08/27/2024 1440         Date/Time Order Dose Route Action     08/26/2024 1618 iohexol (OMNIPAQUE) 350 MG/ML injection (MULTI-DOSE) 85 mL 85 mL Intravenous Given     08/26/2024 8424 clopidogrel (PLAVIX) tablet 300 mg 300 mg  Oral Given     08/26/2024 1651 aspirin chewable tablet 81 mg 81 mg Oral Given     08/27/2024 0917 enoxaparin (LOVENOX) subcutaneous injection 40 mg 40 mg Subcutaneous Given     08/26/2024 2211 insulin glargine (LANTUS) subcutaneous injection 17 Units 0.17 mL 17 Units Subcutaneous Given     08/27/2024 0120 insulin lispro (HumALOG/ADMELOG) 100 units/mL subcutaneous injection 1-6 Units 1 Units Subcutaneous Given     08/27/2024 0629 magnesium sulfate 2 g/50 mL IVPB (premix) 2 g 2 g Intravenous New Bag     08/27/2024 1309 insulin lispro (HumALOG/ADMELOG) 100 units/mL subcutaneous injection 1-6 Units 2 Units Subcutaneous Given            Past Medical History:   Diagnosis Date    Hypertension      Present on Admission:   Type 2 diabetes mellitus with hyperglycemia (HCC)   Essential hypertension   Anemia      Admitting Diagnosis: Slurred speech [R47.81]  Stroke-like symptom [R29.90]  Age/Sex: 49 y.o. female  Admission Orders:  SCD  Baseline NIH stroke scale on admission, reassess Q24H x 2 D, Nursing dysphagia screen prior to staring diet, neuro checks.   Tele    Scheduled Medications:  aspirin, 81 mg, Oral, Daily  atorvastatin, 40 mg, Oral, QPM  clopidogrel, 75 mg, Oral, Daily  cyanocobalamin, 500 mcg, Oral, Daily  enoxaparin, 40 mg, Subcutaneous, Daily  [START ON 8/28/2024] ferrous sulfate, 325 mg, Oral, Daily With Breakfast  insulin glargine, 17 Units, Subcutaneous, HS  insulin lispro, 1-6 Units, Subcutaneous, 4x Daily (AC & HS)      Continuous IV Infusions: none     PRN Meds:  acetaminophen, 650 mg, Oral, Q4H PRN  labetalol, 10 mg, Intravenous, Q6H PRN        IP CONSULT TO NEUROLOGY  IP CONSULT TO NEUROLOGY  IP CONSULT TO CASE MANAGEMENT  IP CONSULT TO NUTRITION SERVICES    Network Utilization Review Department  ATTENTION: Please call with any questions or concerns to [phone] and carefully listen to the prompts so that you are directed to the right person. All voicemails are confidential.   For Discharge needs,  contact Care Management DC Support Team at [phone] opt. 2  Send all requests for admission clinical reviews, approved or denied determinations and any other requests to dedicated fax number below belonging to the campus where the patient is receiving treatment. List of dedicated fax numbers for the Facilities:  FACILITY NAME UR FAX NUMBER   ADMISSION DENIALS (Administrative/Medical Necessity) [phone]   DISCHARGE SUPPORT TEAM (NETWORK) [phone]   PARENT CHILD HEALTH (Maternity/NICU/Pediatrics) [phone]   [facility] [phone]   [facility] [phone]   [facility] [phone]   [facility] [phone]   [facility] [phone]   [facility] [phone]   [facility] [phone]   [facility] [phone]   [facility] [phone]   [facility] [phone]   [facility] [phone]   [facility] [phone]

## 2024-08-27 NOTE — ASSESSMENT & PLAN NOTE
PTA lisinopril-hydrochlorothiazide (PRINZIDE,ZESTORETIC) 20-25 MG daily tablet held for permissive hypertension  Consider to restart following permissive HTN for 48 h till tomorrow 5 am as per neurology

## 2024-08-27 NOTE — PHYSICAL THERAPY NOTE
"   PT EVALUATION    Pt is not in need of further skilled IP PT services as pt is fully IND. Will dc pt from IP PT caseload.    NAME:  Faye Samuels  AGE:   49 y.o.  Mrn:   [id number]  Length Of Stay: 0    ADMIT DX:  Stroke-like symptom [R29.90]    Past Medical History:   Diagnosis Date    Hypertension      Past Surgical History:   Procedure Laterality Date    IR LUMBAR PUNCTURE  6/7/2024 08/27/24 0942   PT Last Visit   PT Visit Date 08/27/24   Note Type   Note type Evaluation   Pain Assessment   Pain Assessment Tool 0-10   Pain Score No Pain   Restrictions/Precautions   Other Precautions Multiple lines;Telemetry  (IV;pt seen in the ED)   Home Living   Type of Home Apartment   Home Layout Two level;Able to live on main level with bedroom/bathroom;Performs ADLs on one level;Stairs to enter with rails  (1.5 flights to enter from the back;1 flight to enter from the front)   Bathroom Shower/Tub Tub/shower unit   Bathroom Toilet Standard   Bathroom Equipment   (no DME per pt)   Bathroom Accessibility Accessible   Home Equipment   (no DME per pt)   Prior Function   Level of [place] Independent with ADLs;Independent with functional mobility;Independent with IADLS   Lives With Family  (dtr and her BF, 1 grandchild 1y8m, and pt's fiance)   Receives Help From Family   IADLs Independent with meal prep;Independent with medication management;Family/Friend/Other provides transportation   Falls in the last 6 months 0  (denies)   Vocational Full time employment  (\"[employer]\")   Comments Pt is fully IND PTA. Pt walks, uses public transport or family drives pt when needed.   General   Additional Pertinent History Pt is adm with stroke-like symptoms including unsteady on feet, R facial droop and dysarthria. Pt reports all resolved except dysarthria. MRI - Small acute infarct within the left gangliocapsular region.  No acute hemorrhage or mass effect.  Stable nonspecific foci of T2 FLAIR signal.abnormality in the bifrontal " "subcortical white matter.   Family/Caregiver Present Yes  (pt's brother)   Cognition   Overall Cognitive Status WFL   Arousal/Participation Cooperative   Orientation Level Oriented X4   Memory Within functional limits   Following Commands Follows all commands and directions without difficulty   Comments At least 2 pt identifiers including name and    Subjective   Subjective \"good\"   RUE Assessment   RUE Assessment WFL  (grossly 4+/5)   LUE Assessment   LUE Assessment WFL  (grossly 4+/5)   RLE Assessment   RLE Assessment WFL  (grossly 4+/5)   LLE Assessment   LLE Assessment WFL  (grossly 4+/5)   Vision-Basic Assessment   Current Vision No visual deficits   Coordination   Movements are Fluid and Coordinated 1   Sensation WFL   Finger to Nose & Finger to Finger  Intact   Heel to Shin Intact   Rapid Alternating Movements Intact   Light Touch   RLE Light Touch Grossly intact   LLE Light Touch Grossly intact   Proprioception   RLE Proprioception Grossly intact   LLE Proprioception Grossly Intact   Bed Mobility   Supine to Sit 7  Independent   Additional Comments Pt donned hospital PJ bottoms while seated EOB - IND   Transfers   Sit to Stand 7  Independent   Stand to Sit 7  Independent   Ambulation/Elevation   Gait pattern WNL   Gait Assistance 7  Independent   Assistive Device None   Distance 250 feet with change in direction   Balance   Static Sitting Good   Static Standing Good   Ambulatory Good   Activity Tolerance   Activity Tolerance Patient tolerated treatment well   Nurse Made Aware KIKO Islas   Assessment   Problem List   (dysarthria)   Assessment Patient seen for Physical Therapy evaluation. Patient admitted with Stroke (cerebrum) (HCC).  Comorbidities affecting patient's physical performance include: DM2, HTN, obesity, HLD.  Personal factors affecting patient at time of initial evaluation include: lives in two story house and stairs to enter home. Prior to admission, patient was independent with functional " mobility without assistive device, independent with ADLS, independent with IADLS, living with family in a two level home with 1.5 vs 1 flight steps to enter, ambulating household distance, ambulating community distances, and works full time.  Please find objective findings from Physical Therapy assessment regarding body systems outlined above with impairments and limitations including  dysarthria .  The Barthel Index was used as a functional outcome tool presenting with a score of Barthel Index Score: 100 today indicating no limitations of functional mobility and ADLS.  Patient's clinical presentation is currently evolving as seen in patient's presentation of dysarthria. Pt is not in need of further skilled IP PT services as pt is fully IND. Will dc pt from IP PT caseload. As demonstrated by objective findings, the assigned level of complexity for this evaluation is low.    The patient's [facility] Basic Mobility Inpatient Short Form Raw Score is 24. A Raw score of greater than 16 suggests the patient may benefit from discharge to home. Please also refer to the recommendation of the Physical Therapist for safe discharge planning.   Goals   Patient Goals to go home   Plan   Treatment/Interventions ADL retraining;Functional transfer training;LE strengthening/ROM;Elevations;Therapeutic exercise;Endurance training;Patient/family training;Equipment eval/education;Bed mobility;Gait training;Compensatory technique education;Spoke to nursing;Spoke to case management;Family   PT Frequency Other (Comment)  (1 visit - pt is IND;no further skilled PT needs)   Discharge Recommendation   Rehab Resource Intensity Level, PT No post-acute rehabilitation needs   [facility]   Turning in Flat Bed Without Bedrails 4   Lying on Back to Sitting on Edge of Flat Bed Without Bedrails 4   Moving Bed to Chair 4   Standing Up From Chair Using Arms 4   Walk in Room 4   Climb 3-5 Stairs With Railing 4   Basic Mobility Inpatient  Raw Score 24   Basic Mobility Standardized Score 57.68   [facility] Highest Level Of Mobility   -[facility] Goal 8: Walk 250 feet or more   -HLM Achieved 8: Walk 250 feet ot more   Modified Issa Scale   Modified Issa Scale 1   Barthel Index   Feeding 10   Bathing 5   Grooming Score 5   Dressing Score 10   Bladder Score 10   Bowels Score 10   Toilet Use Score 10   Transfers (Bed/Chair) Score 15   Mobility (Level Surface) Score 15   Stairs Score 10   Barthel Index Score 100   End of Consult   Patient Position at End of Consult Bedside chair;All needs within reach   Licensure   [place] License Number  Mesha Patino, PT

## 2024-08-27 NOTE — PROGRESS NOTES
[facility]  Progress Note  Name: Faye Samuels I  MRN: [id number]  Unit/Bed#: ED-24 I Date of Admission: 8/26/2024   Date of Service: 8/27/2024 I Hospital Day: 0    Assessment & Plan   * Stroke (cerebrum) (HCC)  Assessment & Plan  Risk factors of uncontrolled diabetes, hypertension, and hyperlipidemia  Initial presentation of right-sided weakness, dysarthria and dysphagia, represents left MCA territory, suspect TIA  Current presentation with an isolated dysarthria, likely from small vessel etiology localizes in the brainstem  MRI brain: Small acute infarct within the left gangliocapsular region.     Monitoring  - Neurochecks Q4H  - Telemetry to assess for atrial fibrillation or other dysrhythmias  - STAT CT Head for any changes in mental status or neuro exam  Diagnostic Studies:  - Labs HgbA1c 9.1, Lipid Panel , HDL 49, TSH wnl, Troponin  - EKG  - CTA head and Neck w/ contras [no LVO, no hemorrhage]  - TTE  Blood Pressure:  - Permissive HTN for 24-48 hours  - Treat BP if >220/110 mg Hg  - After permissive HTN, BP goal <140/90 mm Hg  Medication    - Aspirin 81 mg daily starting tomorrow  - Plavix 75 mg Daily  - High dose statin therapy with Atorvastatin 40 mg  - SSI for glycemic control (HgbA1c 13.8 from June/24)  Education  - Stroke education  - Weight loss education - low fat, low carbohydrate, low sodium (I.e Mediterranean or DASH) diet and daily exercise   - outpatient referral to sleep for possible QUENTIN   Rehab  - Nursing bedside dysphagia screen  - PT/OT/ST - SD home recommended      Anemia  Assessment & Plan  Presented with microcytic anemia, hemoglobin 10.7 with MCV 71  Iron panel was recently done on June 2024 with normal TIBC and low iron level and iron saturation level just above possible iron deficiency anemia  Would recommend patient to be started on iron supplement every other day.    Electrolyte abnormality  Assessment & Plan  Mild hypomagnesemia was noticed on  admission  Repletion provided  Follow daily electrolytes  Pleat as needed    Essential hypertension  Assessment & Plan  PTA lisinopril-hydrochlorothiazide (PRINZIDE,ZESTORETIC) 20-25 MG daily tablet held for permissive hypertension  Consider to restart following permissive HTN for 48 h till tomorrow 5 am as per neurology    Type 2 diabetes mellitus with hyperglycemia (HCC)  Assessment & Plan  Lab Results   Component Value Date    HGBA1C 13.8 (H) 06/03/2024       Recent Labs     08/26/24  1613 08/26/24  2200 08/27/24  0120   POCGLU 232* 181* 159*       Blood Sugar Average: Last 72 hrs:  (P) [id number]  home regimen: 35 units Lantus nightly,4 units with meals 3 times daily    Pending speech alyssa, n.p.o.,  Currently on 17 units Lantus with sliding scale Q6  We will resume home regimen upon speech evaluation                         VTE Pharmacologic Prophylaxis: VTE Score: 10 High Risk (Score >/= 5) - Pharmacological DVT Prophylaxis Ordered: enoxaparin (Lovenox). Sequential Compression Devices Ordered.    Mobility:   Basic Mobility Inpatient Raw Score: 24  JH-HLM Goal: 8: Walk 250 feet or more  JH-HLM Achieved: 8: Walk 250 feet ot more  JH-HLM Goal NOT achieved. Continue with multidisciplinary rounding and encourage appropriate mobility to improve upon JH-HLM goals.    Patient Centered Rounds: I performed bedside rounds with nursing staff today.  Discussions with Specialists or Other Care Team Provider: attending physician    Education and Discussions with Family / Patient:  will call later in the day.     Current Length of Stay: 0 day(s)  Current Patient Status: Inpatient   Discharge Plan: Anticipate discharge in 24-48 hrs to discharge location to be determined pending rehab evaluations.    Code Status: Level 1 - Full Code    Subjective:   No acute events overnight, pt is hemodynamically stable.  Evaluated patient at the bedside this a.m., not in acute distress, pleasant, stated that she is feeling better.   Early a.m. patient had mild residual dysarthria however it is improved this afternoon.  Patient stated that she is feeling well and expressed no acute concerns    Objective:     Vitals:   Temp (24hrs), Av.9 °F (36.6 °C), Min:97.9 °F (36.6 °C), Max:97.9 °F (36.6 °C)    Temp:  [97.9 °F (36.6 °C)] 97.9 °F (36.6 °C)  HR:  [] 99  Resp:  [16-20] 19  BP: (137-185)/() 169/77  SpO2:  [95 %-99 %] 95 %  Body mass index is 39.54 kg/m².     Input and Output Summary (last 24 hours):     Intake/Output Summary (Last 24 hours) at 2024 1539  Last data filed at 2024 1027  Gross per 24 hour   Intake 50 ml   Output --   Net 50 ml       Physical Exam:   Physical Exam  Vitals and nursing note reviewed.   Constitutional:       General: She is not in acute distress.     Appearance: She is well-developed.   HENT:      Head: Normocephalic and atraumatic.   Eyes:      Conjunctiva/sclera: Conjunctivae normal.   Cardiovascular:      Rate and Rhythm: Normal rate and regular rhythm.      Heart sounds: No murmur heard.  Pulmonary:      Effort: Pulmonary effort is normal. No respiratory distress.      Breath sounds: Normal breath sounds.   Abdominal:      Palpations: Abdomen is soft.      Tenderness: There is no abdominal tenderness.   Musculoskeletal:         General: No swelling.      Cervical back: Neck supple.   Skin:     General: Skin is warm and dry.      Capillary Refill: Capillary refill takes less than 2 seconds.   Neurological:      General: No focal deficit present.      Mental Status: She is alert and oriented to person, place, and time. Mental status is at baseline.      Comments: Strength preserved in bilateral upper and lower extremities, no sensitivity abnormalities, pronation preserved   Psychiatric:         Mood and Affect: Mood normal.     Mild dysarthria noted this a.m., significantly improved this afternoon, patient states that her speech almost at baseline    Additional Data:     Labs:  Results from  last 7 days   Lab Units 08/27/24  0504   WBC Thousand/uL 8.88   HEMOGLOBIN g/dL 10.7*   HEMATOCRIT % 35.4   PLATELETS Thousands/uL 361     Results from last 7 days   Lab Units 08/27/24  0504   SODIUM mmol/L 138   POTASSIUM mmol/L 3.6   CHLORIDE mmol/L 105   CO2 mmol/L 27   BUN mg/dL 11   CREATININE mg/dL 0.65   ANION GAP mmol/L 6   CALCIUM mg/dL 8.6   GLUCOSE RANDOM mg/dL 123     Results from last 7 days   Lab Units 08/26/24  1639   INR  0.97     Results from last 7 days   Lab Units 08/27/24  1252 08/27/24  0731 08/27/24  0120 08/26/24  2200 08/26/24  1613   POC GLUCOSE mg/dl 202* 137 159* 181* 232*     Results from last 7 days   Lab Units 08/27/24  0504   HEMOGLOBIN A1C % 9.1*           Lines/Drains:  Invasive Devices       Peripheral Intravenous Line  Duration             Peripheral IV 06/03/24 Dorsal (posterior);Right Hand 84 days    Peripheral IV 08/26/24 Left Antecubital <1 day    Peripheral IV 08/26/24 Left Antecubital <1 day                      Telemetry:  Telemetry Orders (From admission, onward)               24 Hour Telemetry Monitoring  Continuous x 24 Hours (Telem)        Question:  Reason for 24 Hour Telemetry  Answer:  TIA/Suspected CVA/ Confirmed CVA                     Telemetry Reviewed: Unremarkable             Imaging: Reviewed radiology reports from this admission including: MRI brain    Recent Cultures (last 7 days):         Last 24 Hours Medication List:   Current Facility-Administered Medications   Medication Dose Route Frequency Provider Last Rate    acetaminophen  650 mg Oral Q4H PRN Colt Davis MD      aspirin  81 mg Oral Daily Colt Davis MD      atorvastatin  40 mg Oral QPM Colt Davis MD      clopidogrel  75 mg Oral Daily Colt Davis MD      cyanocobalamin  500 mcg Oral Daily Colt Davis MD      enoxaparin  40 mg Subcutaneous Daily Colt Davis MD      [START ON 8/28/2024] ferrous sulfate  325 mg Oral Daily With Breakfast Colt Davis MD      insulin glargine  17 Units Subcutaneous HS  Colt Davis MD      insulin lispro  1-6 Units Subcutaneous 4x Daily (AC & HS) Sarahi Francisco MD      labetalol  10 mg Intravenous Q6H PRN Sarahi Francisco MD          Today, Patient Was Seen By: Sarahi Francisco MD    **Please Note: This note may have been constructed using a voice recognition system.**     2-3 weeks

## 2024-08-27 NOTE — ASSESSMENT & PLAN NOTE
Mild hypomagnesemia was noticed on admission  Repletion provided  Follow daily electrolytes  Pleat as needed

## 2024-08-27 NOTE — QUICK NOTE
Inpatient consult to Neurology  Consult performed by: Juanita French DO  Consult ordered by: Colt Davis MD        Please see original consult note on 8/26/24 by BANDAR Cannon.

## 2024-08-27 NOTE — SPEECH THERAPY NOTE
Speech-Language Pathology Bedside Swallow Evaluation        Patient Name: Faye Samuels    Today's Date: 8/27/2024     Problem List  Principal Problem:    Stroke (cerebrum) (HCC)  Active Problems:    Type 2 diabetes mellitus with hyperglycemia (HCC)    Essential hypertension    Electrolyte abnormality    Anemia         Past Medical History  Past Medical History:   Diagnosis Date    Hypertension        Past Surgical History  Past Surgical History:   Procedure Laterality Date    IR LUMBAR PUNCTURE  6/7/2024       Summary    Pt presents with swallow function WNL.      Recommendations:   Diet: regular diet and thin liquids   Meds: whole with liquid   Frequent Oral care: 2x/day  Aspiration precautions and compensatory swallowing strategies: upright posture, slow rate of feeding, small bites/sips, and alternating bites and sips      Current Medical Status  Pt is a 49 y.o. female who presented to [facility] with history of obesity, hypertension, type 2 diabetes poorly controlled presented to the hospital due to strokelike symptoms. Patient woke up around 3 PM today and then felt unsteady on her feet. She spoke to her boyfriend who noted some dysarthria and right-sided facial droop. She came to the ED for further evaluation and patient was a stroke alert. Initial NIH score of 2. Patient went for CT head and CTA. Was deemed not to be a candidate for lytics due to last known well outside of window. She reports a similar episode few months ago but as symptoms resolved spontaneously she did not seek care. Discussed blood sugar control. She reports taking her blood sugar up to 4 times daily and using both her short and long-acting. She feels her blood sugars overall have been under better control since starting on insulin during her previous admission.    Past medical history:   Please see H&P for details    Special Studies:  -MRI brain wo contrast 08/26/24: Small acute infarct within the left gangliocapsular region.  No acute hemorrhage or mass effect. Stable nonspecific foci of T2 FLAIR signal abnormality in the bifrontal subcortical white matter.  -CTA stroke alert head/neck 08/26/24: No hemodynamically significant stenosis, dissection or occlusion of the carotid or vertebral arteries or major vessels of the [place].   -CT stroke alert brain 08/26/24: No acute intracranial abnormality.     Social/Education/Vocational Hx:  Pt lives with family.    Swallow Information   Prior speech/swallowing tx: No  Current Risks for Dysphagia & Aspiration: Suspected TIA  Current Symptoms/Concerns:  Suspected TIA  Current Diet: NPO   Baseline Diet: regular diet and thin liquids  Takes pills-baseline whole with thin liquids    Baseline Assessment   Behavior/Cognition: alert  Speech/Language Status: able to participate in conversation and able to follow commands  Patient Positioning: upright in bed     Swallow Mechanism Exam   Facial: symmetrical  Labial: WFL  Lingual: WFL  Velum: unable to visualize  Mandible: adequate ROM  Dentition: adequate  Vocal quality:clear/adequate - mildly dysarthric with improvement as per pt  Volitional Cough: strong/productive   Respiratory: WFL    Consistencies Assessed and Performance   Consistencies Administered: thin liquids, nectar thick, puree, and regular solids    Oral Stage: Pt with adequate utensil stripping/bolus retrieval/labial seal with thin liquids and nectar thick liquids consistencies and pureed solids and regular solids via spoon and cup sip. Labial/lingual/jaw ROM/coordination/strength WFL. Natural teeth intact.. adequate oral control/bolus manipulation with pureed solids and regular solids with timely mastication cycle. No lingual residue remained s/p swallow of regular solids. Pt with adequate suck-swallow technique during straw sip trials with thin liquids. timely A-P transport with all trialed consistencies.      Pharyngeal Stage: Swallow trigger noted as timely as evidenced via  visual/tactile assessment of hyolaryngeal elevation/excursion, suspected adequate hyolaryngeal elevation/excursion upon palpation. No overt clinical s/s pen/asp observed. No coughing/throat clearing observed during PO intake. Vocal quality WFL s/p swallow.        Esophageal Concerns: none reported      Results Reviewed with: patient, RN, and MD   Dysphagia Goals: Pt will tolerate the safest, LRD x 1-3x.    Speech Therapy Prognosis   Prognosis: good    Prognosis Considerations: therapeutic potential        Nyla Diaz MS, CCC-SLP  Speech Pathologist  Available via [employer]

## 2024-08-27 NOTE — OCCUPATIONAL THERAPY NOTE
Occupational Therapy Screen    Patient Name: Faye Samuels  Today's Date: 8/27/2024 08/27/24 1239   OT Last Visit   OT Visit Date 08/27/24   Note Type   Note type Screen   Additional Comments OT orders received and chart review performed. Pt admitted w/ stroke-like symptoms including slurred speech and facial droop. Per MRI: Small acute infarct within the left gangliocapsular region. Spoke to PT Katie who reports pt is (I) w/ ADLs/mobility and denies concerns about safe DC home. Will screen from caseload. Please reconslt if functional status significantly changes.     Mindy Bryant, OTD, OTR/L  [place] License [id number]  [place] License [id number]

## 2024-08-27 NOTE — PROGRESS NOTES
Progress Note - Neurology   Faye Samuels 49 y.o. female [id number]  Unit/Bed#: ED-24/ED-24    Assessment:    * Stroke (cerebrum) (HCC)  Assessment & Plan  48 y/o female with HTN, who presented with slurred speech and R facial droop. Stroke alert was initiated in ED. NIHSS 2. BP on presentation 179/105. Patient was deemed not a TNK candidate due to being outside of the appropriate time window. Imaging with acute infarct noted. Suspect most likely small vessel etiology.    Workup:  -CT head: No acute intracranial abnormality.  -CTA head and neck: No hemodynamically significant stenosis, dissection or occlusion of the carotid or vertebral arteries or major vessels of the [place].   -MRI brain: Small acute infarct within the left gangliocapsular region. No acute hemorrhage or mass effect. Stable nonspecific foci of T2 FLAIR signal abnormality in the bifrontal subcortical white matter.  -Labs: TSH 1.735,     Plan:  - Stroke pathway  Echo  Hemoglobin A1c  Give Plavix 300 mg x 1; continue aspirin 81 mg and Plavix 75 mg x 21 days, then transition to Plavix monotherapy  Atorvastatin 40 mg  Permissive HTN, -210 x 48 hours from onset of symptoms; then goal normotension and avoid hypotension  Continue telemetry  PT/OT/ST  Frequent neuro checks. Continue to monitor and notify neurology with any changes.   - Medical management and supportive care per primary team. Correction of any metabolic or infectious disturbances.        Faye Samuels will need follow-up in in 6 weeks with neurovascular team for Small vessel lacunar infarct in 60 minute appointment. They will not require outpatient neurological testing.     Case and treatment plan reviewed with attending neurologist, Dr. Gooden. Please see attending attestation for any further recommendations.    Subjective:   Patient resting in bed. She reports that she continues to have slurred speech but it is improved from yesterday. She denies any weakness,  numbness/tingling, headache, dizziness/lightheadedness, pain, or vision changes. She reports being able to walk to and from the bathroom without issues.        Past Medical History:   Diagnosis Date    Hypertension      Past Surgical History:   Procedure Laterality Date    IR LUMBAR PUNCTURE  6/7/2024     No family history on file.  Social History     Socioeconomic History    Marital status: Single     Spouse name: Not on file    Number of children: Not on file    Years of education: Not on file    Highest education level: Not on file   Occupational History    Not on file   Tobacco Use    Smoking status: Never    Smokeless tobacco: Never   Vaping Use    Vaping status: Never Used   Substance and Sexual Activity    Alcohol use: Not Currently    Drug use: Not Currently    Sexual activity: Not on file   Other Topics Concern    Not on file   Social History Narrative    Not on file     Social Determinants of Health     Financial Resource Strain: Not on file   Food Insecurity: No Food Insecurity (8/26/2024)    Hunger Vital Sign     Worried About Running Out of Food in the Last Year: Never true     Ran Out of Food in the Last Year: Never true   Transportation Needs: No Transportation Needs (8/26/2024)    PRAPARE - Transportation     Lack of Transportation (Medical): No     Lack of Transportation (Non-Medical): No   Physical Activity: Not on file   Stress: Not on file   Social Connections: Not on file   Intimate Partner Violence: Not on file   Housing Stability: Low Risk  (8/26/2024)    Housing Stability Vital Sign     Unable to Pay for Housing in the Last Year: No     Number of Times Moved in the Last Year: 0     Homeless in the Last Year: No         Medications:  All current active meds have been reviewed and current meds:  Scheduled Meds:  Current Facility-Administered Medications   Medication Dose Route Frequency Provider Last Rate    acetaminophen  650 mg Oral Q4H PRN Colt Davis MD      aspirin  81 mg Oral Daily  Colt Davis MD      atorvastatin  40 mg Oral QPM Colt Davis MD      clopidogrel  75 mg Oral Daily Colt Davis MD      cyanocobalamin  500 mcg Oral Daily Colt Davis MD      enoxaparin  40 mg Subcutaneous Daily Colt Davis MD      insulin glargine  17 Units Subcutaneous HS Colt Davis MD      insulin lispro  1-6 Units Subcutaneous Q6H Colt Davis MD      labetalol  10 mg Intravenous Q6H PRN Sarahi Francisco MD      magnesium sulfate  2 g Intravenous Once Sarahi Francisco MD 2 g (08/27/24 0629)     Continuous Infusions:   PRN Meds:.  acetaminophen    labetalol       ROS:   A 12 system ROS was completed. Other than the above mentioned complaints in the HPI and those commented on below, all remaining systems were negative.      Vitals:   BP (!) 171/88   Pulse 88   Temp 97.9 °F (36.6 °C)   Resp 18   Wt 104 kg (230 lb 6.1 oz)   SpO2 96%   BMI 39.54 kg/m²       Physical Exam:   Physical Exam  Vitals and nursing note reviewed.   Constitutional:       General: She is not in acute distress.     Appearance: She is not ill-appearing or diaphoretic.   HENT:      Head: Normocephalic.      Mouth/Throat:      Mouth: Mucous membranes are moist.      Pharynx: Oropharynx is clear.   Eyes:      General: No scleral icterus.        Right eye: No discharge.         Left eye: No discharge.      Extraocular Movements: Extraocular movements intact and EOM normal.      Conjunctiva/sclera: Conjunctivae normal.   Cardiovascular:      Rate and Rhythm: Normal rate.   Pulmonary:      Effort: Pulmonary effort is normal. No respiratory distress.   Musculoskeletal:         General: Normal range of motion.   Skin:     General: Skin is warm and dry.      Coloration: Skin is not jaundiced or pale.   Neurological:      Mental Status: She is alert and oriented to person, place, and time.      Coordination: Finger-Nose-Finger Test normal.   Psychiatric:         Mood and Affect: Mood normal.       Neurologic Exam     Mental Status    Oriented to person, place, and time.   Level of consciousness: alert  Able to follow commands appropriately. No aphasia noted. Mild dysarthria.     Cranial Nerves     CN II   Right visual field deficit: none  Left visual field deficit: none     CN III, IV, VI   Extraocular motions are normal.     CN V   Facial sensation intact.     CN VIII   Hearing: intact    CN IX, X   Palate: symmetric    CN XI   CN XI normal.     CN XII   CN XII normal.   Decreased R nasolabial fold     Motor Exam   Muscle bulk: normal  Right arm pronator drift: absent  Left arm pronator drift: absent  Bilateral UE strength 5/5 deltoids, biceps, triceps, hand   Bilateral LE strength 5/5 hip flexion, dorsiflexion, plantar flexion     Sensory Exam   Light touch normal.     Gait, Coordination, and Reflexes     Coordination   Finger to nose coordination: normal    Tremor   Resting tremor: absent  Intention tremor: absent      Labs: I have personally reviewed pertinent reports.   Recent Results (from the past 24 hour(s))   Fingerstick Glucose (POCT)    Collection Time: 08/26/24  4:13 PM   Result Value Ref Range    POC Glucose 232 (H) 65 - 140 mg/dl   Basic metabolic panel    Collection Time: 08/26/24  4:39 PM   Result Value Ref Range    Sodium 135 135 - 147 mmol/L    Potassium 3.8 3.5 - 5.3 mmol/L    Chloride 104 96 - 108 mmol/L    CO2 24 21 - 32 mmol/L    ANION GAP 7 4 - 13 mmol/L    BUN 18 5 - 25 mg/dL    Creatinine 0.91 0.60 - 1.30 mg/dL    Glucose 243 (H) 65 - 140 mg/dL    Calcium 8.3 (L) 8.4 - 10.2 mg/dL    eGFR 73 ml/min/1.73sq m   CBC and Platelet    Collection Time: 08/26/24  4:39 PM   Result Value Ref Range    WBC 8.40 4.31 - 10.16 Thousand/uL    RBC 4.81 3.81 - 5.12 Million/uL    Hemoglobin 10.3 (L) 11.5 - 15.4 g/dL    Hematocrit 33.8 (L) 34.8 - 46.1 %    MCV 70 (L) 82 - 98 fL    MCH 21.4 (L) 26.8 - 34.3 pg    MCHC 30.5 (L) 31.4 - 37.4 g/dL    RDW 16.0 (H) 11.6 - 15.1 %    Platelets 335 149 - 390 Thousands/uL    MPV 10.0 8.9 - 12.7 fL  "  Protime-INR    Collection Time: 08/26/24  4:39 PM   Result Value Ref Range    Protime 13.6 12.3 - 15.0 seconds    INR 0.97 0.85 - 1.19   APTT    Collection Time: 08/26/24  4:39 PM   Result Value Ref Range    PTT 34 23 - 34 seconds   HS Troponin 0hr (reflex protocol)    Collection Time: 08/26/24  4:39 PM   Result Value Ref Range    hs TnI 0hr 3 \"Refer to ACS Flowchart\"- see link ng/L   FLU/RSV/COVID - if FLU/RSV clinically relevant    Collection Time: 08/26/24  4:39 PM    Specimen: Nose; Nares   Result Value Ref Range    SARS-CoV-2 Negative Negative    INFLUENZA A PCR Negative Negative    INFLUENZA B PCR Negative Negative    RSV PCR Negative Negative   ECG 12 lead    Collection Time: 08/26/24  4:44 PM   Result Value Ref Range    Ventricular Rate 101 BPM    Atrial Rate 101 BPM    NE Interval 142 ms    QRSD Interval 78 ms    QT Interval 368 ms    QTC Interval 477 ms    P Axis 46 degrees    QRS Axis 52 degrees    T Wave Axis 59 degrees   Fingerstick Glucose (POCT)    Collection Time: 08/26/24 10:00 PM   Result Value Ref Range    POC Glucose 181 (H) 65 - 140 mg/dl   Fingerstick Glucose (POCT)    Collection Time: 08/27/24  1:20 AM   Result Value Ref Range    POC Glucose 159 (H) 65 - 140 mg/dl   TSH, 3rd generation with Free T4 reflex    Collection Time: 08/27/24  5:04 AM   Result Value Ref Range    TSH 3RD GENERATON 1.735 0.450 - 4.500 uIU/mL   Lipid Panel with Direct LDL reflex    Collection Time: 08/27/24  5:04 AM   Result Value Ref Range    Cholesterol 183 See Comment mg/dL    Triglycerides 131 See Comment mg/dL    HDL, Direct 48 (L) >=50 mg/dL    LDL Calculated 109 (H) 0 - 100 mg/dL   Basic metabolic panel    Collection Time: 08/27/24  5:04 AM   Result Value Ref Range    Sodium 138 135 - 147 mmol/L    Potassium 3.6 3.5 - 5.3 mmol/L    Chloride 105 96 - 108 mmol/L    CO2 27 21 - 32 mmol/L    ANION GAP 6 4 - 13 mmol/L    BUN 11 5 - 25 mg/dL    Creatinine 0.65 0.60 - 1.30 mg/dL    Glucose 123 65 - 140 mg/dL    Glucose, " Fasting 123 (H) 65 - 99 mg/dL    Calcium 8.6 8.4 - 10.2 mg/dL    eGFR 103 ml/min/1.73sq m   Magnesium    Collection Time: 08/27/24  5:04 AM   Result Value Ref Range    Magnesium 1.8 (L) 1.9 - 2.7 mg/dL   Phosphorus    Collection Time: 08/27/24  5:04 AM   Result Value Ref Range    Phosphorus 2.9 2.7 - 4.5 mg/dL   CBC (With Platelets)    Collection Time: 08/27/24  5:04 AM   Result Value Ref Range    WBC 8.88 4.31 - 10.16 Thousand/uL    RBC 4.97 3.81 - 5.12 Million/uL    Hemoglobin 10.7 (L) 11.5 - 15.4 g/dL    Hematocrit 35.4 34.8 - 46.1 %    MCV 71 (L) 82 - 98 fL    MCH 21.5 (L) 26.8 - 34.3 pg    MCHC 30.2 (L) 31.4 - 37.4 g/dL    RDW 16.2 (H) 11.6 - 15.1 %    Platelets 361 149 - 390 Thousands/uL    MPV 9.8 8.9 - 12.7 fL   Fingerstick Glucose (POCT)    Collection Time: 08/27/24  7:31 AM   Result Value Ref Range    POC Glucose 137 65 - 140 mg/dl       Imaging: I have personally reviewed pertinent imaging in PACS, and I have personally reviewed PACS reports.     EKG, Pathology, and Other Studies: I have personally reviewed pertinent reports.       VTE Prophylaxis: Sequential compression device (Venodyne)  and Enoxaparin (Lovenox)

## 2024-08-28 ENCOUNTER — APPOINTMENT (INPATIENT)
Dept: NON INVASIVE DIAGNOSTICS | Facility: HOSPITAL | Age: 49
DRG: 065 | End: 2024-08-28
Payer: COMMERCIAL

## 2024-08-28 PROBLEM — E87.8 ELECTROLYTE ABNORMALITY: Status: RESOLVED | Noted: 2024-08-27 | Resolved: 2024-08-28

## 2024-08-28 LAB
ANION GAP SERPL CALCULATED.3IONS-SCNC: 5 MMOL/L (ref 4–13)
APICAL FOUR CHAMBER EJECTION FRACTION: 62 %
ASCENDING AORTA: 3.1 CM
BSA FOR ECHO PROCEDURE: 2.06 M2
BUN SERPL-MCNC: 12 MG/DL (ref 5–25)
CALCIUM SERPL-MCNC: 8.7 MG/DL (ref 8.4–10.2)
CHLORIDE SERPL-SCNC: 105 MMOL/L (ref 96–108)
CO2 SERPL-SCNC: 27 MMOL/L (ref 21–32)
CREAT SERPL-MCNC: 0.67 MG/DL (ref 0.6–1.3)
E WAVE DECELERATION TIME: 302 MS
E/A RATIO: 0.83
ERYTHROCYTE [DISTWIDTH] IN BLOOD BY AUTOMATED COUNT: 16.1 % (ref 11.6–15.1)
GFR SERPL CREATININE-BSD FRML MDRD: 102 ML/MIN/1.73SQ M
GLUCOSE SERPL-MCNC: 136 MG/DL (ref 65–140)
GLUCOSE SERPL-MCNC: 146 MG/DL (ref 65–140)
GLUCOSE SERPL-MCNC: 220 MG/DL (ref 65–140)
HCT VFR BLD AUTO: 34.6 % (ref 34.8–46.1)
HGB BLD-MCNC: 10.7 G/DL (ref 11.5–15.4)
MAGNESIUM SERPL-MCNC: 2.2 MG/DL (ref 1.9–2.7)
MCH RBC QN AUTO: 21.8 PG (ref 26.8–34.3)
MCHC RBC AUTO-ENTMCNC: 30.9 G/DL (ref 31.4–37.4)
MCV RBC AUTO: 71 FL (ref 82–98)
MV E'TISSUE VEL-LAT: 13 CM/S
MV E'TISSUE VEL-SEP: 9 CM/S
MV PEAK A VEL: 0.88 M/S
MV PEAK E VEL: 73 CM/S
MV STENOSIS PRESSURE HALF TIME: 88 MS
MV VALVE AREA P 1/2 METHOD: 2.5
PLATELET # BLD AUTO: 339 THOUSANDS/UL (ref 149–390)
PMV BLD AUTO: 9.8 FL (ref 8.9–12.7)
POTASSIUM SERPL-SCNC: 3.9 MMOL/L (ref 3.5–5.3)
RBC # BLD AUTO: 4.9 MILLION/UL (ref 3.81–5.12)
RIGHT VENTRICLE ID DIMENSION: 2.8 CM
SL CV LV EF: 65
SODIUM SERPL-SCNC: 137 MMOL/L (ref 135–147)
TR MAX PG: 24 MMHG
TR PEAK VELOCITY: 2.5 M/S
TRICUSPID ANNULAR PLANE SYSTOLIC EXCURSION: 2.6 CM
TRICUSPID VALVE PEAK REGURGITATION VELOCITY: 2.46 M/S
WBC # BLD AUTO: 7.51 THOUSAND/UL (ref 4.31–10.16)

## 2024-08-28 PROCEDURE — 82948 REAGENT STRIP/BLOOD GLUCOSE: CPT

## 2024-08-28 PROCEDURE — 93308 TTE F-UP OR LMTD: CPT

## 2024-08-28 PROCEDURE — 99239 HOSP IP/OBS DSCHRG MGMT >30: CPT | Performed by: INTERNAL MEDICINE

## 2024-08-28 PROCEDURE — 93325 DOPPLER ECHO COLOR FLOW MAPG: CPT | Performed by: INTERNAL MEDICINE

## 2024-08-28 PROCEDURE — 93321 DOPPLER ECHO F-UP/LMTD STD: CPT

## 2024-08-28 PROCEDURE — 93321 DOPPLER ECHO F-UP/LMTD STD: CPT | Performed by: INTERNAL MEDICINE

## 2024-08-28 PROCEDURE — 92526 ORAL FUNCTION THERAPY: CPT

## 2024-08-28 PROCEDURE — 83735 ASSAY OF MAGNESIUM: CPT | Performed by: INTERNAL MEDICINE

## 2024-08-28 PROCEDURE — 80048 BASIC METABOLIC PNL TOTAL CA: CPT

## 2024-08-28 PROCEDURE — 93308 TTE F-UP OR LMTD: CPT | Performed by: INTERNAL MEDICINE

## 2024-08-28 PROCEDURE — 85027 COMPLETE CBC AUTOMATED: CPT

## 2024-08-28 PROCEDURE — 93325 DOPPLER ECHO COLOR FLOW MAPG: CPT

## 2024-08-28 RX ORDER — INSULIN LISPRO 100 [IU]/ML
4 INJECTION, SOLUTION INTRAVENOUS; SUBCUTANEOUS
Qty: 3.6 ML | Refills: 0 | Status: SHIPPED | OUTPATIENT
Start: 2024-08-28 | End: 2024-09-27

## 2024-08-28 RX ORDER — CLOPIDOGREL BISULFATE 75 MG/1
75 TABLET ORAL DAILY
Qty: 30 TABLET | Refills: 0 | Status: SHIPPED | OUTPATIENT
Start: 2024-08-29 | End: 2024-09-28

## 2024-08-28 RX ORDER — CLOPIDOGREL BISULFATE 75 MG/1
75 TABLET ORAL DAILY
Qty: 30 TABLET | Refills: 0 | OUTPATIENT
Start: 2024-08-28 | End: 2024-09-27

## 2024-08-28 RX ORDER — FERROUS SULFATE 325(65) MG
325 TABLET ORAL EVERY OTHER DAY
Qty: 15 TABLET | Refills: 0 | Status: SHIPPED | OUTPATIENT
Start: 2024-08-28 | End: 2024-09-27

## 2024-08-28 RX ORDER — HYDROCHLOROTHIAZIDE 25 MG/1
25 TABLET ORAL DAILY
Status: DISCONTINUED | OUTPATIENT
Start: 2024-08-28 | End: 2024-08-28 | Stop reason: HOSPADM

## 2024-08-28 RX ORDER — ATORVASTATIN CALCIUM 40 MG/1
40 TABLET, FILM COATED ORAL EVERY EVENING
Qty: 30 TABLET | Refills: 0 | OUTPATIENT
Start: 2024-08-28 | End: 2024-09-27

## 2024-08-28 RX ORDER — INSULIN LISPRO 100 [IU]/ML
4 INJECTION, SOLUTION INTRAVENOUS; SUBCUTANEOUS
Qty: 3.6 ML | Refills: 0 | OUTPATIENT
Start: 2024-08-28 | End: 2024-09-27

## 2024-08-28 RX ORDER — FERROUS SULFATE 325(65) MG
325 TABLET ORAL
Qty: 30 TABLET | Refills: 0 | Status: SHIPPED | OUTPATIENT
Start: 2024-08-29 | End: 2024-08-28

## 2024-08-28 RX ORDER — LISINOPRIL 20 MG/1
20 TABLET ORAL DAILY
Status: DISCONTINUED | OUTPATIENT
Start: 2024-08-28 | End: 2024-08-28 | Stop reason: HOSPADM

## 2024-08-28 RX ORDER — FERROUS SULFATE 325(65) MG
325 TABLET ORAL
Qty: 30 TABLET | Refills: 0 | OUTPATIENT
Start: 2024-08-28 | End: 2024-09-27

## 2024-08-28 RX ORDER — ATORVASTATIN CALCIUM 40 MG/1
40 TABLET, FILM COATED ORAL EVERY EVENING
Qty: 30 TABLET | Refills: 0 | Status: SHIPPED | OUTPATIENT
Start: 2024-08-28 | End: 2024-09-27

## 2024-08-28 RX ADMIN — INSULIN LISPRO 2 UNITS: 100 INJECTION, SOLUTION INTRAVENOUS; SUBCUTANEOUS at 12:27

## 2024-08-28 RX ADMIN — ENOXAPARIN SODIUM 40 MG: 40 INJECTION SUBCUTANEOUS at 08:58

## 2024-08-28 RX ADMIN — HYDROCHLOROTHIAZIDE 25 MG: 25 TABLET ORAL at 08:58

## 2024-08-28 RX ADMIN — Medication 500 MCG: at 08:58

## 2024-08-28 RX ADMIN — FERROUS SULFATE TAB 325 MG (65 MG ELEMENTAL FE) 325 MG: 325 (65 FE) TAB at 08:58

## 2024-08-28 RX ADMIN — CLOPIDOGREL BISULFATE 75 MG: 75 TABLET ORAL at 08:58

## 2024-08-28 RX ADMIN — ASPIRIN 81 MG 81 MG: 81 TABLET ORAL at 08:58

## 2024-08-28 RX ADMIN — LISINOPRIL 20 MG: 20 TABLET ORAL at 08:58

## 2024-08-28 NOTE — ASSESSMENT & PLAN NOTE
Presented with microcytic anemia, hemoglobin 10.7 with MCV 71  Iron panel was recently done on June 2024 with normal TIBC and low iron level and iron saturation level just above possible iron deficiency anemia  Started iron supplement daily

## 2024-08-28 NOTE — DISCHARGE SUMMARY
[facility]  Discharge- Faye Samuels 1975, 49 y.o. female MRN: [id number]  Unit/Bed#: S -Yonatan Encounter: [id number]  Primary Care Provider: Poonam Dunlap MD   Date and time admitted to hospital: 8/26/2024  4:12 PM    * Stroke (cerebrum) (HCC)  Assessment & Plan  Risk factors of uncontrolled diabetes, hypertension, and hyperlipidemia  Initial presentation of right-sided weakness, dysarthria and dysphagia, represents left MCA territory, suspect TIA  Current presentation with an isolated dysarthria, likely from small vessel etiology localizes in the brainstem  MRI brain: Small acute infarct within the left gangliocapsular region.     Monitoring  - Neurochecks Q4H  - Telemetry to assess for atrial fibrillation or other dysrhythmias  - STAT CT Head for any changes in mental status or neuro exam  Diagnostic Studies:  - Labs HgbA1c 9.1, Lipid Panel , HDL 49, TSH wnl, Troponin -wnl  - EKG - sinus  - CTA head and Neck w/ contras [no LVO, no hemorrhage]  - TTE [EF 65% w/o wall motion abnormality]   Blood Pressure:  - Permissive HTN for 24-48 hours  - Treat BP if >220/110 mg Hg  - After permissive HTN, BP goal <140/90 mm Hg  Medication    - Aspirin 81 mg daily for 21 days  - Plavix 75 mg Daily indefinitely  - High dose statin therapy with Atorvastatin 40 mg  - SSI for glycemic control    Education  - Stroke education  - Weight loss education - low fat, low carbohydrate, low sodium (I.e Mediterranean or DASH) diet and daily exercise   - outpatient referral to sleep for possible QUENTIN   Rehab  - Nursing bedside dysphagia screen  - PT/OT/ST - [facility] home recommended      Anemia  Assessment & Plan  Presented with microcytic anemia, hemoglobin 10.7 with MCV 71  Iron panel was recently done on June 2024 with normal TIBC and low iron level and iron saturation level just above possible iron deficiency anemia  Started iron supplement daily    Essential hypertension  Assessment &  Plan  Restarted PTA equivalent of lisinopril-hydrochlorothiazide (PRINZIDE,ZESTORETIC) 20-25 MG daily tablet  Need to follow up with PCP for optimization      Type 2 diabetes mellitus with hyperglycemia (HCC)  Assessment & Plan  Lab Results   Component Value Date    HGBA1C 9.1 (H) 08/27/2024       Recent Labs     08/27/24  0731 08/27/24  1252 08/27/24  2124 08/28/24  0728   POCGLU 137 202* 194* 146*         Blood Sugar Average: Last 72 hrs:  (P) [id number]  home regimen: 35 units Lantus nightly,4 units with meals 3 times daily    Currently well-controlled with low-carb diet, Lantus 17 units with mealtime sliding scale    Will resume home regimen upon discharge, and optimize with PCP              Medical Problems       Resolved Problems  Date Reviewed: 8/28/2024            Resolved    Electrolyte abnormality 8/28/2024     Resolved by  Elvia Solares MD        Discharging Resident: Colt Davis MD  Discharging Attending: Elvia Chaudhry*  PCP: Poonam Dunlap MD  Admission Date:   Admission Orders (From admission, onward)       Ordered        08/27/24 1442  INPATIENT ADMISSION  Once            08/26/24 1653  Place in Observation  Once                          Discharge Date: 08/28/24    Consultations During Hospital Stay:  Neurology    Procedures Performed:   None    Significant Findings / Test Results:   MRI of the brain: Small acute infarct within the left ganglial capsular region    Incidental Findings:   none      Test Results Pending at Discharge (will require follow up):  None     Outpatient Tests Requested:  None    Complications: None    Reason for Admission: Stroke    Hospital Course:   Faye Samuels is a 49 y.o. female patient who originally presented to the hospital on 8/26/2024 due to strokelike symptoms.  Initially noted by her significant other's, noting apparent weakness in the right side extremities, more prominent in the upper arm on head right-sided facial droop  "and dysarthria.  Upon arrival in the ED, patient was hypertensive, dysarthric and along with left facial droop.  Initial evaluation of NIHSS score of 2 for points from aforementioned symptoms.  CTA and CT head demonstrated no large vessel occlusion or hemorrhage.  Upon reevaluation, right sided facial droop resolved, with residual dysarthria.  MRI demonstrated small acute infarct within the left ganglial capsular region, deemed to small vessel etiology with her having underlying risk factors of diabetes and hypertension.  Patient was monitored during hospital stay, clinically remained stable without new onset deficit.  Cleared by PT OT and speech eval.  Glucose has been well-controlled during her hospital stay.  Patient was educated and instructed for optimization of her diabetes and hypertension for prevention of future stroke.  She will need to follow-up with neurologist within 6 weeks.  Patient was instructed to take dual antiplatelet therapy for 21 days, and continue Plavix indefinitely.  She will continue to follow-up with PCP for optimization of her hypertension and diabetes.    Please see above list of diagnoses and related plan for additional information.     Condition at Discharge: fair    Discharge Day Visit / Exam:   Subjective: No acute events overnight, endorses improvement in her voice.  Patient reports no new neurological symptoms at this time. She denies any deficits such as weakness, impaired coordinations, sensory abnormalities including numbness, double vision, facial asymmetry. Denies headache and nausea.     Vitals: Blood Pressure: 163/88 (08/28/24 0926)  Pulse: 84 (08/28/24 0926)  Temperature: 98.4 °F (36.9 °C) (08/28/24 0725)  Temp Source: Oral (08/28/24 0725)  Respirations: 17 (08/28/24 0725)  Height: 5' 4\" (162.6 cm) (08/28/24 0926)  Weight - Scale: 103 kg (226 lb) (08/28/24 0926)  SpO2: 98 % (08/28/24 0725)  Exam:   Physical Exam  Vitals reviewed.   Constitutional:       General: She is " awake.   Cardiovascular:      Rate and Rhythm: Normal rate and regular rhythm.   Pulmonary:      Effort: Pulmonary effort is normal.      Breath sounds: Normal breath sounds.   Abdominal:      Palpations: Abdomen is soft.      Tenderness: There is no abdominal tenderness.   Neurological:      General: No focal deficit present.      Mental Status: She is alert.      Cranial Nerves: Cranial nerves 2-12 are intact.      Sensory: Sensation is intact.      Motor: Motor function is intact.      Coordination: Coordination is intact.         Discussion with Family: Updated  (significant other) at bedside.    Discharge instructions/Information to patient and family:   See after visit summary for information provided to patient and family.      Provisions for Follow-Up Care:  See after visit summary for information related to follow-up care and any pertinent home health orders.      Mobility at time of Discharge:   Basic Mobility Inpatient Raw Score: 24  JH-HLM Goal: 8: Walk 250 feet or more  JH-HLM Achieved: 7: Walk 25 feet or more  HLM Goal achieved. Continue to encourage appropriate mobility.     Disposition:   Home    Planned Readmission: None    Discharge Medications:  See after visit summary for reconciled discharge medications provided to patient and/or family.      **Please Note: This note may have been constructed using a voice recognition system**

## 2024-08-28 NOTE — UTILIZATION REVIEW
NOTIFICATION OF INPATIENT ADMISSION   AUTHORIZATION REQUEST   SERVICING FACILITY:   [place] [address]  Tax ID: [id number]  NPI: [id number]   ATTENDING PROVIDER:  Attending Name and NPI#: Elvia Solares Md [[id number]]  Address: [address] [place] [address]  Phone: [phone]     ADMISSION INFORMATION:  Place of Service: Inpatient [facility]  Place of Service Code: 21  Inpatient Admission Date/Time: 8/27/24  2:42 PM  Discharge Date/Time: No discharge date for patient encounter.  Admitting Diagnosis Code/Description:  Slurred speech [R47.81]  Stroke-like symptom [R29.90]     UTILIZATION REVIEW CONTACT:  Concepcion Wheeler Utilization   Network Utilization Review Department  Phone: [phone]  Fax: [phone]  Email: [email or web address]  Contact for approvals/pending authorizations, clinical reviews, and discharge.     PHYSICIAN ADVISORY SERVICES:  Medical Necessity Denial & Giyr-ct-Sqej Review  Phone: [phone]  Fax: [phone]  Email: [email or web address]     DISCHARGE SUPPORT TEAM:  For Patients Discharge Needs & Updates  Phone: [phone] opt. 2 Fax: [phone]  Email: [email or web address]

## 2024-08-28 NOTE — ASSESSMENT & PLAN NOTE
Restarted PTA equivalent of lisinopril-hydrochlorothiazide (PRINZIDE,ZESTORETIC) 20-25 MG daily tablet  Need to follow up with PCP for optimization

## 2024-08-28 NOTE — UTILIZATION REVIEW
Continued Stay Review    Date: 8-28-24                           Current Patient Class: Inpatient Current Level of Care: med surg     HPI:49 y.o. female initially admitted on 8-26-24      Assessment/Plan:     Afebrile, VSS.  ECHO done today wnl.   Continue plavix, aspirin.  Monitored on telemetry overnight NSR.  GCS 15 this am.   PT/OT : no rehab needs.      Discharged to home.     49 y.o. female patient who originally presented to the hospital on 8/26/2024 due to strokelike symptoms.  Initially noted by her significant other's, noting apparent weakness in the right side extremities, more prominent in the upper arm on head right-sided facial droop and dysarthria.  Upon arrival in the ED, patient was hypertensive, dysarthric and along with left facial droop.  Initial evaluation of NIHSS score of 2 for points from aforementioned symptoms.  CTA and CT head demonstrated no large vessel occlusion or hemorrhage.  Upon reevaluation, right sided facial droop resolved, with residual dysarthria.  MRI demonstrated small acute infarct within the left ganglial capsular region, deemed to small vessel etiology with her having underlying risk factors of diabetes and hypertension.  Patient was monitored during hospital stay, clinically remained stable without new onset deficit.  Cleared by PT OT and speech eval.  Glucose has been well-controlled during her hospital stay.  Patient was educated and instructed for optimization of her diabetes and hypertension for prevention of future stroke.  She will need to follow-up with neurologist within 6 weeks.  Patient was instructed to take dual antiplatelet therapy for 21 days, and continue Plavix indefinitely.  She will continue to follow-up with PCP for optimization of her hypertension and diabetes.       Vital Signs (last 3 days)       Date/Time Temp Pulse Resp BP MAP  SpO2 O2 Device [place] Coma Scale Score Pain    08/28/24 0926 -- 84 -- 163/88 -- -- -- -- --    08/28/24 0900 -- -- --  -- -- -- -- 15 No Pain    08/28/24 07:25:13 98.4 °F (36.9 °C) 84 17 163/88 113 98 % None (Room air) -- --    08/28/24 07:24:44 98.4 °F (36.9 °C) 81 -- 163/88 113 98 % -- -- --    08/28/24 0530 -- -- -- -- -- -- -- 15 --    08/28/24 0130 -- -- -- -- -- -- -- 15 --    08/27/24 21:22:25 98.8 °F (37.1 °C) 89 18 122/79 93 96 % -- -- --    08/27/24 2100 -- -- -- -- -- -- None (Room air) 15 No Pain    08/27/24 1721 97.8 °F (36.6 °C) -- 18 -- -- 98 % None (Room air) 15 No Pain    08/27/24 17:16:56 97.8 °F (36.6 °C) 86 -- 149/84 106 93 % -- -- --    08/27/24 1702 -- -- -- -- -- -- -- -- No Pain    08/27/24 1608 -- 81 20 135/79 100 98 % None (Room air) -- --    08/27/24 1129 -- 99 19 169/77 -- 95 % None (Room air) -- --    08/27/24 0942 -- -- -- -- -- -- -- -- No Pain    08/27/24 0503 -- 88 -- -- -- -- -- -- --    08/27/24 0500 -- 89 18 171/88 122 96 % None (Room air) 15 No Pain    08/27/24 0300 -- -- -- -- -- -- -- 15 --    08/27/24 0100 -- 85 -- 137/65 93 -- -- 15 --    08/27/24 0000 -- 90 -- 158/78 109 -- -- -- --    08/26/24 2300 -- 96 -- 183/99 132 -- -- 15 --    08/26/24 2230 -- 94 16 174/105 135 98 % None (Room air) -- --    08/26/24 1900 -- 106 18 154/99 123 99 % -- 15 --    08/26/24 1847 -- -- -- -- -- -- -- 15 --    08/26/24 1830 -- -- -- -- -- -- -- 15 --    08/26/24 1800 -- 97 18 183/86 123 98 % -- 15 --    08/26/24 1745 -- 99 18 185/97 134 99 % -- -- --    08/26/24 1730 -- -- -- -- -- -- -- 15 --    08/26/24 1700 -- -- -- -- -- -- -- 15 --    08/26/24 1652 97.9 °F (36.6 °C) -- -- -- -- -- -- -- --    08/26/24 1645 -- 98 18 172/102 -- 97 % -- 15 --    08/26/24 1630 -- 107 18 165/89 -- 98 % -- 15 --    08/26/24 16:25:51 -- 106 18 168/87 120 99 % -- -- --    08/26/24 1625 -- 106 18 168/87 -- 97 % -- 15 --    08/26/24 1613 -- 103 20 179/105 -- 97 % None (Room air) -- --          Weight (last 2 days)       Date/Time Weight    08/28/24 0926 103 (226)    08/27/24 1702 103 (226.19)    08/26/24 1625 104 (230.38)               Pertinent Labs/Diagnostic Results:   Radiology:  MRI brain wo contrast   Final  (08/26 2135)      Small acute infarct within the left gangliocapsular region.   No acute hemorrhage or mass effect.   Stable nonspecific foci of T2 FLAIR signal abnormality in the bifrontal subcortical white matter.            CT stroke alert brain   Final (08/26 1631)      No acute intracranial abnormality.         CTA stroke alert (head/neck)   Final  (08/26 1631)      No hemodynamically significant stenosis, dissection or occlusion of the carotid or vertebral arteries or major vessels of the [place].        Cardiology:  Echo follow up/limited w/ contrast if indicated   Final (08/28 1220)        Left Ventricle: Left ventricular cavity size is normal. Wall thickness    is normal. The left ventricular ejection fraction is 65%. Systolic    function is normal. Wall motion is normal. Diastolic function is normal.     Atrial Septum: No patent foramen ovale detected, confirmed at rest,    confirmed by provocation with valsalva, using agitated saline contrast.           GI:  No orders to display       Results from last 7 days   Lab Units 08/26/24  1639   SARS-COV-2  Negative     Results from last 7 days   Lab Units 08/28/24  0617 08/27/24  0504 08/26/24  1639   WBC Thousand/uL 7.51 8.88 8.40   HEMOGLOBIN g/dL 10.7* 10.7* 10.3*   HEMATOCRIT % 34.6* 35.4 33.8*   PLATELETS Thousands/uL 339 361 335         Results from last 7 days   Lab Units 08/28/24  0617 08/28/24  0509 08/27/24  0504 08/26/24  1639   SODIUM mmol/L 137  --  138 135   POTASSIUM mmol/L 3.9  --  3.6 3.8   CHLORIDE mmol/L 105  --  105 104   CO2 mmol/L 27 -- 27 24   ANION GAP mmol/L 5  --  6 7   BUN mg/dL 12  --  11 18   CREATININE mg/dL 0.67  --  0.65 0.91   EGFR ml/min/1.73sq m 102  --  103 73   CALCIUM mg/dL 8.7  --  8.6 8.3*   MAGNESIUM mg/dL  --  2.2 1.8*  --    PHOSPHORUS mg/dL  --   --  2.9  --          Results from last 7 days   Lab Units 08/28/24  1138  08/28/24  0728 08/27/24  2124 08/27/24  1252 08/27/24  0731 08/27/24  0120 08/26/24  2200 08/26/24  1613   POC GLUCOSE mg/dl 220* 146* 194* 202* 137 159* 181* 232*     Results from last 7 days   Lab Units 08/28/24  0617 08/27/24  0504 08/26/24  1639   GLUCOSE RANDOM mg/dL 136 123 243*         Results from last 7 days   Lab Units 08/27/24  0504   HEMOGLOBIN A1C % 9.1*   EAG mg/dl 214           Results from last 7 days   Lab Units 08/26/24  1639   HS TNI 0HR ng/L 3         Results from last 7 days   Lab Units 08/26/24  1639   PROTIME seconds 13.6   INR  0.97   PTT seconds 34     Results from last 7 days   Lab Units 08/27/24  0504   TSH 3RD GENERATON uIU/mL 1.735     Results from last 7 days   Lab Units 08/26/24  1639   INFLUENZA A PCR  Negative   INFLUENZA B PCR  Negative   RSV PCR  Negative       Scheduled Medications:    aspirin, 81 mg, Oral, Daily  atorvastatin, 40 mg, Oral, QPM  clopidogrel, 75 mg, Oral, Daily  cyanocobalamin, 500 mcg, Oral, Daily  enoxaparin, 40 mg, Subcutaneous, Daily  ferrous sulfate, 325 mg, Oral, Daily With Breakfast  lisinopril, 20 mg, Oral, Daily   And  hydroCHLOROthiazide, 25 mg, Oral, Daily  insulin glargine, 17 Units, Subcutaneous, HS  insulin lispro, 1-6 Units, Subcutaneous, 4x Daily (AC & HS)      Continuous IV Infusions:     PRN Meds:  acetaminophen, 650 mg, Oral, Q4H PRN  labetalol, 10 mg, Intravenous, Q6H PRN        Discharge Plan: to be determined     Network Utilization Review Department  ATTENTION: Please call with any questions or concerns to [phone] and carefully listen to the prompts so that you are directed to the right person. All voicemails are confidential.   For Discharge needs, contact Care Management DC Support Team at [phone] opt. 2  Send all requests for admission clinical reviews, approved or denied determinations and any other requests to dedicated fax number below belonging to the campus where the patient is receiving treatment. List of dedicated fax  numbers for the Facilities:  FACILITY NAME UR FAX NUMBER   ADMISSION DENIALS (Administrative/Medical Necessity) [phone]   DISCHARGE SUPPORT TEAM (NETWORK) [phone]   PARENT CHILD HEALTH (Maternity/NICU/Pediatrics) [phone]   [facility] [phone]   [facility] [phone]   [facility] [phone]   [facility] [phone]   [facility] [phone]   [facility] [phone]   [facility] [phone]   [facility] [phone]   [facility] [phone]   [facility] [phone]   [facility] [phone]   [facility] [phone]

## 2024-08-28 NOTE — ASSESSMENT & PLAN NOTE
Risk factors of uncontrolled diabetes, hypertension, and hyperlipidemia  Initial presentation of right-sided weakness, dysarthria and dysphagia, represents left MCA territory, suspect TIA  Current presentation with an isolated dysarthria, likely from small vessel etiology localizes in the brainstem  MRI brain: Small acute infarct within the left gangliocapsular region.     Monitoring  - Neurochecks Q4H  - Telemetry to assess for atrial fibrillation or other dysrhythmias  - STAT CT Head for any changes in mental status or neuro exam  Diagnostic Studies:  - Labs HgbA1c 9.1, Lipid Panel , HDL 49, TSH wnl, Troponin -wnl  - EKG - sinus  - CTA head and Neck w/ contras [no LVO, no hemorrhage]  - TTE [EF 65% w/o wall motion abnormality]   Blood Pressure:  - Permissive HTN for 24-48 hours  - Treat BP if >220/110 mg Hg  - After permissive HTN, BP goal <140/90 mm Hg  Medication    - Aspirin 81 mg daily for 21 days  - Plavix 75 mg Daily indefinitely  - High dose statin therapy with Atorvastatin 40 mg  - SSI for glycemic control    Education  - Stroke education  - Weight loss education - low fat, low carbohydrate, low sodium (I.e Mediterranean or DASH) diet and daily exercise   - outpatient referral to sleep for possible QUENTIN   Rehab  - Nursing bedside dysphagia screen  - PT/OT/ST - IA home recommended

## 2024-08-28 NOTE — DISCHARGE INSTR - AVS FIRST PAGE
Dear Faye Samuels,     [facility]  It is our hope that we were always able to exceed the expected standards for your care during your stay.  You were hospitalized due to stroke, blood clot in your brain.  For follow up as well as any medication refills, we recommend that you follow up with your primary care physician. However, at this time we provide for you here, the most important instructions / recommendations at discharge:     Notable Medication Adjustments -   Please start taking clopidogrel (Plavix) 75 mg daily, you will be on this medication indefinitely  Please continue to take aspirin 81 mg daily till September 17  Please now take atorvastatin 40 mg daily  Please start taking iron pills every other day with breakfast  Testing Required after Discharge -   None  Important follow up information -   Please follow-up with your PCP within 1 to 2 weeks for your hypertension and management of diabetes  Please follow-up with neurology visit in 6 weeks for your stroke follow-up  Other Instructions -   It is important that you follow-up with your doctors in the upcoming weeks  - If you do not hear from our scheduling office or need additional assistance, please call our scheduling [phone]    If you feel like your illness/symptoms are worsening and needs urgent care  wait until your follow-up visit or your primary care physician cannot see you  - You can get same-day medical attention at urgent care  - If you need a more serious immediate care, please return to the ER: Things to watch out for include sudden changes in sensations and strength in the legs, returning of slurred speech, facial droop.           Please review this entire after visit summary as additional general instructions including medication list, appointments, activity, diet, any pertinent wound care, and other additional recommendations from your care team that may be  provided for you.      Sincerely,     Colt Davis MD

## 2024-08-28 NOTE — SPEECH THERAPY NOTE
Speech Language/Pathology    Speech/Language Pathology Progress Note    Patient Name: Faye Samuels  Today's Date: 8/28/2024       Subjective:  Pt seen for dysphagia tx and was alert and oriented upon arrival.       Objective:  Pt observed with regular diet with thin liquids. Pt with adequate bite strength/utensil stripping, with timely mastication cycle noted, adequate bolus manipulation/formation. No buccal pocketing observed. Pt with timely A-P propulsion and swallow trigger on both solids/liquids with No residue s/p swallow. Vocal quality WFL s/p swallow. No coughing/throat clearing noted on solids/liquids.timely hyolaryngeal elevation/excursion as noted on palpation with all trialed consistencies. adequate suck-swallow technique via straw sips of thin liquids. No coughing/throat clearing noted on thin liquids via straw sip.      Assessment:  Pt exhibits no clinical overt s/s pen/asp on current diet consistency of regular solids/thin liquids. Education was given on the importance of utilizing compensatory swallow strategies for safe and adequate swallow functioning as well as education on meeting nutrition/hydration standards for overall QOL. Pt expressed understanding.     Plan/Recommendations:  Pt currently tolerating regular diet with thin liquids. Continue current diet consistency. Maintain aspiration precautions, upright ~90 degree positioning during ALL PO intake and at least 30 minutes post PO meals, small bites, slow rate of intake, and alternate liquids/solids. Medications as tolerated. ST signing off.      Nyla Diaz MS, CCC-SLP  Speech Pathologist  Available via [employer]

## 2024-08-28 NOTE — ASSESSMENT & PLAN NOTE
Lab Results   Component Value Date    HGBA1C 9.1 (H) 08/27/2024       Recent Labs     08/27/24  0731 08/27/24  1252 08/27/24 2124 08/28/24  0728   POCGLU 137 202* 194* 146*         Blood Sugar Average: Last 72 hrs:  (P) [id number]  home regimen: 35 units Lantus nightly,4 units with meals 3 times daily    Currently well-controlled with low-carb diet, Lantus 17 units with mealtime sliding scale    Will resume home regimen upon discharge, and optimize with PCP

## 2024-08-29 VITALS
DIASTOLIC BLOOD PRESSURE: 65 MMHG | RESPIRATION RATE: 17 BRPM | TEMPERATURE: 98.1 F | HEART RATE: 63 BPM | SYSTOLIC BLOOD PRESSURE: 124 MMHG | OXYGEN SATURATION: 95 % | HEIGHT: 64 IN | WEIGHT: 226 LBS | BODY MASS INDEX: 38.58 KG/M2

## 2024-08-29 NOTE — UTILIZATION REVIEW
NOTIFICATION OF ADMISSION DISCHARGE   This is a Notification of Discharge from [facility]. Please be advised that this patient has been discharge from our facility. Below you will find the admission and discharge date and time including the patient’s disposition.   UTILIZATION REVIEW CONTACT:  Concepcion Wheeler  Utilization   Network Utilization Review Department  Phone: [phone] x carefully listen to the prompts. All voicemails are confidential.  Email: [email or web address]     ADMISSION INFORMATION  PRESENTATION DATE: 8/26/2024  4:12 PM  OBERVATION ADMISSION DATE: 08/26/2024 1653  INPATIENT ADMISSION DATE: 8/27/24  2:42 PM   DISCHARGE DATE: 8/28/2024  4:25 PM   DISPOSITION:Home/Self Care    Network Utilization Review Department  ATTENTION: Please call with any questions or concerns to [phone] and carefully listen to the prompts so that you are directed to the right person. All voicemails are confidential.   For Discharge needs, contact Care Management DC Support Team at [phone] opt. 2  Send all requests for admission clinical reviews, approved or denied determinations and any other requests to dedicated fax number below belonging to the campus where the patient is receiving treatment. List of dedicated fax numbers for the Facilities:  FACILITY NAME UR FAX NUMBER   ADMISSION DENIALS (Administrative/Medical Necessity) [phone]   DISCHARGE SUPPORT TEAM ([facility]) [phone]   PARENT CHILD HEALTH (Maternity/NICU/Pediatrics) [phone]   [facility] [phone]   [facility] [phone]   [facility] [phone]   [facility] [phone]   [facility] [phone]   [facility] [phone]   [facility] [phone]   [facility]  [phone]   [facility] [phone]   [facility] [phone]   [facility] [phone]   [facility] [phone]

## 2024-08-30 ENCOUNTER — TELEPHONE (OUTPATIENT)
Age: 49
End: 2024-08-30

## 2024-08-30 NOTE — TELEPHONE ENCOUNTER
BENJAMIN/ LUZ SALAZAR/ Stroke (cerebrum) ([facility])     DC- HOME- 8/28/2024    ----- Message from BANDAR Cannon sent at 8/27/2024  9:53 AM EDT -----  Regarding: BENJAMIN  Faye Samuels will need follow-up in in 6 weeks with neurovascular team for Small vessel lacunar infarct in 60 minute appointment. They will not require outpatient neurological testing.

## 2024-09-04 ENCOUNTER — TELEPHONE (OUTPATIENT)
Dept: NEUROLOGY | Facility: CLINIC | Age: 49
End: 2024-09-04

## 2024-09-04 LAB
ATRIAL RATE: 101 BPM
P AXIS: 46 DEGREES
PR INTERVAL: 142 MS
QRS AXIS: 52 DEGREES
QRSD INTERVAL: 78 MS
QT INTERVAL: 368 MS
QTC INTERVAL: 477 MS
T WAVE AXIS: 59 DEGREES
VENTRICULAR RATE: 101 BPM

## 2024-09-04 PROCEDURE — 93010 ELECTROCARDIOGRAM REPORT: CPT | Performed by: INTERNAL MEDICINE

## 2024-09-04 NOTE — TELEPHONE ENCOUNTER
Post CVA Discharge Follow Up  Hospitalization: 8/26/24-8/28/24    Called patient to obtain an update and to help arrange neurology HFU appointment. There was no answer. Left a voice message requesting for a return call. Provided the office's phone number.

## 2024-09-06 NOTE — TELEPHONE ENCOUNTER
Post CVA Discharge Follow Up  Hospitalization: 8/26/24-8/28/24    Called patient to obtain an update and to help arrange the neurology HFU appointment. There was no answer. Left a voice message requesting for a return call. Provided the office's phone number.

## 2024-09-20 NOTE — TELEPHONE ENCOUNTER
Post CVA Discharge Follow Up  Hospitalization: 8/26/24-8/28/24     Called patient to obtain an update and to help arrange the neurology HFU appointment. There was no answer. Left a voice message requesting for a return call. Provided the office's phone number.     3rd attempt- mailed unable to reach letter.

## 2024-09-27 ENCOUNTER — TELEPHONE (OUTPATIENT)
Dept: NEUROLOGY | Facility: CLINIC | Age: 49
End: 2024-09-27

## 2024-10-25 ENCOUNTER — APPOINTMENT (OUTPATIENT)
Dept: LAB | Facility: HOSPITAL | Age: 49
End: 2024-10-25
Payer: COMMERCIAL

## 2024-10-25 DIAGNOSIS — D64.9 RELATIVE ANEMIA: ICD-10-CM

## 2024-10-25 DIAGNOSIS — E11.9 DIABETES MELLITUS WITHOUT COMPLICATION (HCC): ICD-10-CM

## 2024-10-25 DIAGNOSIS — D64.9 ANEMIA, UNSPECIFIED TYPE: ICD-10-CM

## 2024-10-25 LAB
ALBUMIN SERPL BCG-MCNC: 4.2 G/DL (ref 3.5–5)
ALP SERPL-CCNC: 62 U/L (ref 34–104)
ALT SERPL W P-5'-P-CCNC: 9 U/L (ref 7–52)
ANION GAP SERPL CALCULATED.3IONS-SCNC: 10 MMOL/L (ref 4–13)
AST SERPL W P-5'-P-CCNC: 10 U/L (ref 13–39)
BASOPHILS # BLD AUTO: 0.1 THOUSANDS/ΜL (ref 0–0.1)
BASOPHILS NFR BLD AUTO: 1 % (ref 0–1)
BILIRUB SERPL-MCNC: 0.47 MG/DL (ref 0.2–1)
BUN SERPL-MCNC: 28 MG/DL (ref 5–25)
CALCIUM SERPL-MCNC: 9.7 MG/DL (ref 8.4–10.2)
CHLORIDE SERPL-SCNC: 104 MMOL/L (ref 96–108)
CO2 SERPL-SCNC: 24 MMOL/L (ref 21–32)
CREAT SERPL-MCNC: 1.02 MG/DL (ref 0.6–1.3)
EOSINOPHIL # BLD AUTO: 0.79 THOUSAND/ΜL (ref 0–0.61)
EOSINOPHIL NFR BLD AUTO: 8 % (ref 0–6)
ERYTHROCYTE [DISTWIDTH] IN BLOOD BY AUTOMATED COUNT: 14.4 % (ref 11.6–15.1)
EST. AVERAGE GLUCOSE BLD GHB EST-MCNC: 189 MG/DL
GFR SERPL CREATININE-BSD FRML MDRD: 63 ML/MIN/1.73SQ M
GLUCOSE P FAST SERPL-MCNC: 117 MG/DL (ref 65–99)
HBA1C MFR BLD: 8.2 %
HCT VFR BLD AUTO: 40.1 % (ref 34.8–46.1)
HGB BLD-MCNC: 12.2 G/DL (ref 11.5–15.4)
IMM GRANULOCYTES # BLD AUTO: 0.02 THOUSAND/UL (ref 0–0.2)
IMM GRANULOCYTES NFR BLD AUTO: 0 % (ref 0–2)
LYMPHOCYTES # BLD AUTO: 3.67 THOUSANDS/ΜL (ref 0.6–4.47)
LYMPHOCYTES NFR BLD AUTO: 37 % (ref 14–44)
MCH RBC QN AUTO: 21.3 PG (ref 26.8–34.3)
MCHC RBC AUTO-ENTMCNC: 30.4 G/DL (ref 31.4–37.4)
MCV RBC AUTO: 70 FL (ref 82–98)
MONOCYTES # BLD AUTO: 0.74 THOUSAND/ΜL (ref 0.17–1.22)
MONOCYTES NFR BLD AUTO: 7 % (ref 4–12)
NEUTROPHILS # BLD AUTO: 4.63 THOUSANDS/ΜL (ref 1.85–7.62)
NEUTS SEG NFR BLD AUTO: 47 % (ref 43–75)
NRBC BLD AUTO-RTO: 0 /100 WBCS
PLATELET # BLD AUTO: 417 THOUSANDS/UL (ref 149–390)
PMV BLD AUTO: 10.4 FL (ref 8.9–12.7)
POTASSIUM SERPL-SCNC: 3.7 MMOL/L (ref 3.5–5.3)
PROT SERPL-MCNC: 7.4 G/DL (ref 6.4–8.4)
RBC # BLD AUTO: 5.72 MILLION/UL (ref 3.81–5.12)
SODIUM SERPL-SCNC: 138 MMOL/L (ref 135–147)
WBC # BLD AUTO: 9.95 THOUSAND/UL (ref 4.31–10.16)

## 2024-10-25 PROCEDURE — 83036 HEMOGLOBIN GLYCOSYLATED A1C: CPT

## 2024-10-25 PROCEDURE — 36415 COLL VENOUS BLD VENIPUNCTURE: CPT

## 2024-10-25 PROCEDURE — 85025 COMPLETE CBC W/AUTO DIFF WBC: CPT

## 2024-10-25 PROCEDURE — 80053 COMPREHEN METABOLIC PANEL: CPT

## 2024-11-06 DIAGNOSIS — E11.65 TYPE 2 DIABETES MELLITUS WITH HYPERGLYCEMIA, WITH LONG-TERM CURRENT USE OF INSULIN (HCC): ICD-10-CM

## 2024-11-06 DIAGNOSIS — Z79.4 TYPE 2 DIABETES MELLITUS WITH HYPERGLYCEMIA, WITH LONG-TERM CURRENT USE OF INSULIN (HCC): ICD-10-CM

## 2024-11-06 RX ORDER — ACYCLOVIR 400 MG/1
1 TABLET ORAL CONTINUOUS
Qty: 1 EACH | Refills: 0 | Status: SHIPPED | OUTPATIENT
Start: 2024-11-06